# Patient Record
Sex: FEMALE | Race: WHITE | NOT HISPANIC OR LATINO | URBAN - METROPOLITAN AREA
[De-identification: names, ages, dates, MRNs, and addresses within clinical notes are randomized per-mention and may not be internally consistent; named-entity substitution may affect disease eponyms.]

---

## 2017-01-17 ENCOUNTER — APPOINTMENT (RX ONLY)
Dept: URBAN - METROPOLITAN AREA CLINIC 349 | Facility: CLINIC | Age: 72
Setting detail: DERMATOLOGY
End: 2017-01-17

## 2017-01-17 DIAGNOSIS — L57.0 ACTINIC KERATOSIS: ICD-10-CM

## 2017-01-17 DIAGNOSIS — L82.1 OTHER SEBORRHEIC KERATOSIS: ICD-10-CM

## 2017-01-17 DIAGNOSIS — Z41.9 ENCOUNTER FOR PROCEDURE FOR PURPOSES OTHER THAN REMEDYING HEALTH STATE, UNSPECIFIED: ICD-10-CM

## 2017-01-17 DIAGNOSIS — Z71.89 OTHER SPECIFIED COUNSELING: ICD-10-CM

## 2017-01-17 DIAGNOSIS — D18.0 HEMANGIOMA: ICD-10-CM

## 2017-01-17 PROBLEM — D18.01 HEMANGIOMA OF SKIN AND SUBCUTANEOUS TISSUE: Status: ACTIVE | Noted: 2017-01-17

## 2017-01-17 PROCEDURE — 17003 DESTRUCT PREMALG LES 2-14: CPT

## 2017-01-17 PROCEDURE — ? COUNSELING

## 2017-01-17 PROCEDURE — ? COSMETIC CONSULTATION: FILLERS

## 2017-01-17 PROCEDURE — ? LIQUID NITROGEN

## 2017-01-17 PROCEDURE — 17000 DESTRUCT PREMALG LESION: CPT

## 2017-01-17 PROCEDURE — ? RECOMMENDATIONS

## 2017-01-17 PROCEDURE — 99202 OFFICE O/P NEW SF 15 MIN: CPT | Mod: 25

## 2017-01-17 ASSESSMENT — LOCATION SIMPLE DESCRIPTION DERM
LOCATION SIMPLE: LEFT CHEEK
LOCATION SIMPLE: RIGHT CHEEK
LOCATION SIMPLE: CHEST

## 2017-01-17 ASSESSMENT — PAIN INTENSITY VAS: HOW INTENSE IS YOUR PAIN 0 BEING NO PAIN, 10 BEING THE MOST SEVERE PAIN POSSIBLE?: NO PAIN

## 2017-01-17 ASSESSMENT — LOCATION DETAILED DESCRIPTION DERM
LOCATION DETAILED: RIGHT INFERIOR CENTRAL MALAR CHEEK
LOCATION DETAILED: LEFT MEDIAL MALAR CHEEK
LOCATION DETAILED: LEFT INFERIOR LATERAL BUCCAL CHEEK
LOCATION DETAILED: LEFT LATERAL BUCCAL CHEEK
LOCATION DETAILED: LEFT SUPERIOR MEDIAL BUCCAL CHEEK
LOCATION DETAILED: LEFT MEDIAL SUPERIOR CHEST
LOCATION DETAILED: LEFT CENTRAL BUCCAL CHEEK

## 2017-01-17 ASSESSMENT — LOCATION ZONE DERM
LOCATION ZONE: FACE
LOCATION ZONE: TRUNK

## 2017-01-17 NOTE — PROCEDURE: LIQUID NITROGEN
Number Of Freeze-Thaw Cycles: 2 freeze-thaw cycles
Duration Of Freeze Thaw-Cycle (Seconds): 3
Consent: The patient's consent was obtained including but not limited to risks of crusting, scabbing, blistering, scarring, darker or lighter pigmentary change, recurrence, incomplete removal and infection.
Render Post-Care Instructions In Note?: no
Post-Care Instructions: I reviewed with the patient in detail post-care instructions. Patient is to wear sunprotection, and avoid picking at any of the treated lesions. Pt may apply Vaseline to crusted or scabbing areas.
Detail Level: Detailed

## 2017-01-26 ENCOUNTER — HOSPITAL ENCOUNTER (OUTPATIENT)
Dept: SURGERY | Age: 72
Discharge: HOME | End: 2017-01-26

## 2017-01-26 VITALS
DIASTOLIC BLOOD PRESSURE: 70 MMHG | WEIGHT: 137.25 LBS | RESPIRATION RATE: 16 BRPM | TEMPERATURE: 97.4 F | SYSTOLIC BLOOD PRESSURE: 121 MMHG | BODY MASS INDEX: 23.43 KG/M2 | HEART RATE: 73 BPM | OXYGEN SATURATION: 98 % | HEIGHT: 64 IN

## 2017-01-26 RX ORDER — PAROXETINE 10 MG/1
10 TABLET, FILM COATED ORAL
COMMUNITY

## 2017-01-26 RX ORDER — ZINC GLUCONATE 10 MG
LOZENGE ORAL
COMMUNITY
End: 2021-05-26 | Stop reason: CLARIF

## 2017-01-26 RX ORDER — OMEPRAZOLE 40 MG/1
40 CAPSULE, DELAYED RELEASE ORAL
COMMUNITY
End: 2021-05-26 | Stop reason: CLARIF

## 2017-01-26 NOTE — PERIOP NOTES
Patient verified name, , and surgery as listed in [facility]. TYPE  CASE:1b  Orders per surgeon:   Received  Labs per surgeon:none  Labs per anesthesia protocol: none  EKG  :  none      Patient provided with handouts including guide to surgery , transfusions, pain management and hand hygiene for the family and community. Pt verbalizes understanding of all pre-op instructions . Instructed that family must be present in building at all times. Hibiclens and instructions given per hospital policy. Instructed patient to continue  previous medications as prescribed prior to surgery and  to take the following medications the day of surgery according to anesthesia guidelines : omeprazole, paroxetine       Original medication prescription bottles not visualized during patient appointment. Continue all previous medications unless otherwise directed. Instructed patient to hold  the following medications prior to surgery: biotin, magnesium      Patient verbalized understanding of all instructions and provided all medical/health information to the best of their ability.

## 2017-02-02 ENCOUNTER — ANESTHESIA EVENT (OUTPATIENT)
Dept: SURGERY | Age: 72
End: 2017-02-02
Payer: MEDICARE

## 2017-02-03 ENCOUNTER — ANESTHESIA (OUTPATIENT)
Dept: SURGERY | Age: 72
End: 2017-02-03
Payer: MEDICARE

## 2017-02-03 ENCOUNTER — HOSPITAL ENCOUNTER (OUTPATIENT)
Age: 72
Setting detail: OUTPATIENT SURGERY
Discharge: HOME | End: 2017-02-03
Attending: PODIATRIST | Admitting: PODIATRIST
Payer: MEDICARE

## 2017-02-03 ENCOUNTER — APPOINTMENT (OUTPATIENT)
Dept: GENERAL RADIOLOGY | Age: 72
End: 2017-02-03
Attending: PODIATRIST
Payer: MEDICARE

## 2017-02-03 VITALS
TEMPERATURE: 97.5 F | HEART RATE: 58 BPM | WEIGHT: 137 LBS | RESPIRATION RATE: 16 BRPM | BODY MASS INDEX: 23.39 KG/M2 | OXYGEN SATURATION: 99 % | HEIGHT: 64 IN | DIASTOLIC BLOOD PRESSURE: 79 MMHG | SYSTOLIC BLOOD PRESSURE: 129 MMHG

## 2017-02-03 PROCEDURE — 77030013392 HC CAP PROTCT PIN JRGN -B: Performed by: PODIATRIST

## 2017-02-03 PROCEDURE — 74011250636 HC RX REV CODE- 250/636

## 2017-02-03 PROCEDURE — 77030020754 HC CUF TRNQT 2BLA STRY -B: Performed by: PODIATRIST

## 2017-02-03 PROCEDURE — 76060000035 HC ANESTHESIA 2 TO 2.5 HR: Performed by: PODIATRIST

## 2017-02-03 PROCEDURE — 76210000006 HC OR PH I REC 0.5 TO 1 HR: Performed by: PODIATRIST

## 2017-02-03 PROCEDURE — 77030006788 HC BLD SAW OSC STRY -B: Performed by: PODIATRIST

## 2017-02-03 PROCEDURE — 77030002916 HC SUT ETHLN J&J -A: Performed by: PODIATRIST

## 2017-02-03 PROCEDURE — 74011250636 HC RX REV CODE- 250/636: Performed by: ANESTHESIOLOGY

## 2017-02-03 PROCEDURE — 77030002933 HC SUT MCRYL J&J -A: Performed by: PODIATRIST

## 2017-02-03 PROCEDURE — C1713 ANCHOR/SCREW BN/BN,TIS/BN: HCPCS | Performed by: PODIATRIST

## 2017-02-03 PROCEDURE — 76210000020 HC REC RM PH II FIRST 0.5 HR: Performed by: PODIATRIST

## 2017-02-03 PROCEDURE — 74011250636 HC RX REV CODE- 250/636: Performed by: PODIATRIST

## 2017-02-03 PROCEDURE — 76010000131 HC OR TIME 2 TO 2.5 HR: Performed by: PODIATRIST

## 2017-02-03 PROCEDURE — 77030011640 HC PAD GRND REM COVD -A: Performed by: PODIATRIST

## 2017-02-03 PROCEDURE — 74011000250 HC RX REV CODE- 250

## 2017-02-03 PROCEDURE — 77030020778 HC CAP PROTCT PIN JRGN -A: Performed by: PODIATRIST

## 2017-02-03 PROCEDURE — 74011000250 HC RX REV CODE- 250: Performed by: PODIATRIST

## 2017-02-03 PROCEDURE — 77030031139 HC SUT VCRL2 J&J -A: Performed by: PODIATRIST

## 2017-02-03 RX ORDER — PROMETHAZINE HYDROCHLORIDE 25 MG/1
25 TABLET ORAL
Qty: 30 TAB | Refills: 0 | Status: SHIPPED | OUTPATIENT
Start: 2017-02-03 | End: 2017-02-17

## 2017-02-03 RX ORDER — CEFAZOLIN SODIUM IN 0.9 % NACL 2 G/50 ML
2 INTRAVENOUS SOLUTION, PIGGYBACK (ML) INTRAVENOUS ONCE
Status: COMPLETED | OUTPATIENT
Start: 2017-02-03 | End: 2017-02-03

## 2017-02-03 RX ORDER — SODIUM CHLORIDE, SODIUM LACTATE, POTASSIUM CHLORIDE, CALCIUM CHLORIDE 600; 310; 30; 20 MG/100ML; MG/100ML; MG/100ML; MG/100ML
100 INJECTION, SOLUTION INTRAVENOUS CONTINUOUS
Status: DISCONTINUED | OUTPATIENT
Start: 2017-02-03 | End: 2017-02-03 | Stop reason: HOSPADM

## 2017-02-03 RX ORDER — PROPOFOL 10 MG/ML
INJECTION, EMULSION INTRAVENOUS
Status: DISCONTINUED | OUTPATIENT
Start: 2017-02-03 | End: 2017-02-03 | Stop reason: HOSPADM

## 2017-02-03 RX ORDER — MIDAZOLAM HYDROCHLORIDE 1 MG/ML
2 INJECTION, SOLUTION INTRAMUSCULAR; INTRAVENOUS ONCE
Status: COMPLETED | OUTPATIENT
Start: 2017-02-03 | End: 2017-02-03

## 2017-02-03 RX ORDER — HYDROCODONE BITARTRATE AND ACETAMINOPHEN 7.5; 325 MG/1; MG/1
1 TABLET ORAL
Qty: 30 TAB | Refills: 0 | Status: SHIPPED | OUTPATIENT
Start: 2017-02-03 | End: 2021-05-26 | Stop reason: CLARIF

## 2017-02-03 RX ORDER — ONDANSETRON 2 MG/ML
4 INJECTION INTRAMUSCULAR; INTRAVENOUS ONCE
Status: DISCONTINUED | OUTPATIENT
Start: 2017-02-03 | End: 2017-02-03 | Stop reason: HOSPADM

## 2017-02-03 RX ORDER — NALOXONE HYDROCHLORIDE 0.4 MG/ML
0.1 INJECTION, SOLUTION INTRAMUSCULAR; INTRAVENOUS; SUBCUTANEOUS AS NEEDED
Status: DISCONTINUED | OUTPATIENT
Start: 2017-02-03 | End: 2017-02-03 | Stop reason: HOSPADM

## 2017-02-03 RX ORDER — CEFDINIR 300 MG/1
300 CAPSULE ORAL 2 TIMES DAILY
Qty: 20 CAP | Refills: 0 | Status: SHIPPED | OUTPATIENT
Start: 2017-02-03 | End: 2017-02-13

## 2017-02-03 RX ORDER — HYDROMORPHONE HYDROCHLORIDE 2 MG/ML
0.5 INJECTION, SOLUTION INTRAMUSCULAR; INTRAVENOUS; SUBCUTANEOUS
Status: DISCONTINUED | OUTPATIENT
Start: 2017-02-03 | End: 2017-02-03 | Stop reason: HOSPADM

## 2017-02-03 RX ORDER — DIPHENHYDRAMINE HYDROCHLORIDE 50 MG/ML
12.5 INJECTION, SOLUTION INTRAMUSCULAR; INTRAVENOUS
Status: DISCONTINUED | OUTPATIENT
Start: 2017-02-03 | End: 2017-02-03 | Stop reason: HOSPADM

## 2017-02-03 RX ORDER — BUPIVACAINE HYDROCHLORIDE 5 MG/ML
INJECTION, SOLUTION EPIDURAL; INTRACAUDAL AS NEEDED
Status: DISCONTINUED | OUTPATIENT
Start: 2017-02-03 | End: 2017-02-03 | Stop reason: HOSPADM

## 2017-02-03 RX ORDER — OXYCODONE HYDROCHLORIDE 5 MG/1
10 TABLET ORAL
Status: DISCONTINUED | OUTPATIENT
Start: 2017-02-03 | End: 2017-02-03 | Stop reason: HOSPADM

## 2017-02-03 RX ORDER — MIDAZOLAM HYDROCHLORIDE 1 MG/ML
2 INJECTION, SOLUTION INTRAMUSCULAR; INTRAVENOUS
Status: DISCONTINUED | OUTPATIENT
Start: 2017-02-03 | End: 2017-02-03 | Stop reason: HOSPADM

## 2017-02-03 RX ORDER — LIDOCAINE HYDROCHLORIDE 10 MG/ML
0.1 INJECTION INFILTRATION; PERINEURAL AS NEEDED
Status: DISCONTINUED | OUTPATIENT
Start: 2017-02-03 | End: 2017-02-03 | Stop reason: HOSPADM

## 2017-02-03 RX ORDER — FENTANYL CITRATE 50 UG/ML
100 INJECTION, SOLUTION INTRAMUSCULAR; INTRAVENOUS ONCE
Status: DISCONTINUED | OUTPATIENT
Start: 2017-02-03 | End: 2017-02-03 | Stop reason: HOSPADM

## 2017-02-03 RX ORDER — LIDOCAINE HYDROCHLORIDE 10 MG/ML
INJECTION INFILTRATION; PERINEURAL AS NEEDED
Status: DISCONTINUED | OUTPATIENT
Start: 2017-02-03 | End: 2017-02-03 | Stop reason: HOSPADM

## 2017-02-03 RX ORDER — FENTANYL CITRATE 50 UG/ML
INJECTION, SOLUTION INTRAMUSCULAR; INTRAVENOUS AS NEEDED
Status: DISCONTINUED | OUTPATIENT
Start: 2017-02-03 | End: 2017-02-03 | Stop reason: HOSPADM

## 2017-02-03 RX ORDER — PROPOFOL 10 MG/ML
INJECTION, EMULSION INTRAVENOUS AS NEEDED
Status: DISCONTINUED | OUTPATIENT
Start: 2017-02-03 | End: 2017-02-03 | Stop reason: HOSPADM

## 2017-02-03 RX ORDER — OXYCODONE AND ACETAMINOPHEN 7.5; 325 MG/1; MG/1
1 TABLET ORAL
Qty: 20 TAB | Refills: 0 | Status: SHIPPED | OUTPATIENT
Start: 2017-02-03 | End: 2021-05-26 | Stop reason: CLARIF

## 2017-02-03 RX ORDER — ALBUTEROL SULFATE 0.83 MG/ML
2.5 SOLUTION RESPIRATORY (INHALATION) AS NEEDED
Status: DISCONTINUED | OUTPATIENT
Start: 2017-02-03 | End: 2017-02-03 | Stop reason: HOSPADM

## 2017-02-03 RX ADMIN — FENTANYL CITRATE 25 MCG: 50 INJECTION, SOLUTION INTRAMUSCULAR; INTRAVENOUS at 13:15

## 2017-02-03 RX ADMIN — PROPOFOL 80 MG: 10 INJECTION, EMULSION INTRAVENOUS at 11:52

## 2017-02-03 RX ADMIN — CEFAZOLIN 2 G: 1 INJECTION, POWDER, FOR SOLUTION INTRAMUSCULAR; INTRAVENOUS; PARENTERAL at 11:46

## 2017-02-03 RX ADMIN — PROPOFOL 120 MCG/KG/MIN: 10 INJECTION, EMULSION INTRAVENOUS at 11:52

## 2017-02-03 RX ADMIN — PROPOFOL 30 MG: 10 INJECTION, EMULSION INTRAVENOUS at 12:15

## 2017-02-03 RX ADMIN — FENTANYL CITRATE 25 MCG: 50 INJECTION, SOLUTION INTRAMUSCULAR; INTRAVENOUS at 12:45

## 2017-02-03 RX ADMIN — SODIUM CHLORIDE, SODIUM LACTATE, POTASSIUM CHLORIDE, AND CALCIUM CHLORIDE 100 ML/HR: 600; 310; 30; 20 INJECTION, SOLUTION INTRAVENOUS at 09:38

## 2017-02-03 RX ADMIN — MIDAZOLAM HYDROCHLORIDE 2 MG: 1 INJECTION, SOLUTION INTRAMUSCULAR; INTRAVENOUS at 11:41

## 2017-02-03 RX ADMIN — FENTANYL CITRATE 50 MCG: 50 INJECTION, SOLUTION INTRAMUSCULAR; INTRAVENOUS at 11:49

## 2017-02-03 RX ADMIN — SODIUM CHLORIDE, SODIUM LACTATE, POTASSIUM CHLORIDE, AND CALCIUM CHLORIDE: 600; 310; 30; 20 INJECTION, SOLUTION INTRAVENOUS at 11:46

## 2017-02-03 NOTE — DISCHARGE INSTRUCTIONS
Ice and elevate left foot. Surgical shoe to left foot. May bear weight in surgical shoe. [facility]    Post operative instructions and recommendations for your personal comfort following foot surgery    1. Upon arrival home, lie down and elevate your feet and legs approximately 16 on pillows. Also, support your knees with pillows. 2. Post-operative swelling is greatly reduced by the use of ice packs. Ice packs should be applied over the top or bottom of the bandage every twenty minutes on the hour until returning to the office. 3. Take medication prescribed by the doctor according to directions. Avoid taking medication on an empty stomach. 4. Remain quiet and off of your feet as much as possible for the first 48 hours. After this period use discretion and common sense regarding the amount of standing or walking you do. Generally, post-operative patients should stay off their feet for 24-48hours. After this period walking to tolerance is usually allowed (unless otherwise specified by your doctor). 5. Do not be alarmed if there is some slight bleeding on the bandages; good blood supply is essential for normal tissue healing. 6. Keep feet elevated as much as possible for the first three days. Generally above the chest is most desirable. 7. Keep bandages completely dry. 8. Do not remove the surgical bandages. 9. The successful result of your surgery depends on the careful following of these instructions. 10. Please refrain from all alcoholic beverages. 11. If there are any questions or problems, please feel free to phone the doctor at the office. Thad Lopez, [address] [place] [address] [phone]  414 [facility] [phone]                          Temperature of 101 degrees or above. Redness, excessive swelling or bruising, and/or green or yellow, smelly discharge from incision. Loss of sensation - cold, white or blue toes.     AFTER ANESTHESIA  For the first 24 hours and while taking narcotics for pain: DO NOT Drive, Drink Alcoholic beverages, or make important Decisions. Be aware of dizziness following anesthesia and while taking pain medication.     OTHER INSTRUCTIONS    APPOINTMENT DATE/TIME_________________________________    Lor Valle DOCTOR'S PHONE NUMBER__________________________

## 2017-02-03 NOTE — OP NOTES
[facility] 64   OPERATIVE REPORT       Name:  Garry Su   MR#:  [id number]   :  1945   Account #:  [de-identified]   Date of Adm:  2017       DATE OF SURGERY: 2017      PREOPERATIVE DIAGNOSIS: Painful hammertoe deformities, digits 2,   3, 4, 5, left foot. POSTOPERATIVE DIAGNOSIS: Painful hammertoe deformities digits 2,   3, 4, 5, left foot. PROCEDURES   1. Hammertoe correction with arthrodesis of the proximal   interphalangeal joint and arthroplasty of the distal   interphalangeal joints of digits 2, 3, 4, left foot with K-wire   fixation. 2. Derotational arthroplasty, fifth digit, left foot. SURGEON: Barbee Litten, DPM    ANESTHESIA: IV sedation and local anesthesia consisting of 15 mL   of a 1:1 mixture of 1% lidocaine plain and 0.5% Marcaine plain. HEMOSTASIS: A well-padded ankle tourniquet at 250 mmHg for a   total of 94 minutes. PROCEDURE: The patient was brought to the operating room,   positioned on the operating table in normal prone position. The   patient was then properly identified as Cheryl Mayorga. IV   sedation, local anesthesia were administered to the patient's   left foot. The foot was prepped and draped in the usual   customary sterile technique. The foot was elevated,   exsanguinated and the tourniquet was inflated to 250   mmHg. Anesthesia was tested and found to be adequate,   the procedure began. Attention was directed to the second digit on the left foot. A   linear incision was made from the proximal interphalangeal   joint, extending to the distal phalangeal joint and then a   derotational horizontal incision was made with a resultant wedge   of skin removed. It was oriented with its narrower on the   lateral side and wider on the medial side in order to derotate   the tip of the toe. The resultant wedge of skin was removed.  The   subcutaneous tissue and skin were reflected both medially and   laterally to allow exposure of the extensor tendon. It was   transected at the level of the proximal and distal   interphalangeal joints. The medial, lateral collateral ligaments   were released from both joints. The tendon was freed proximally. The head of the proximal phalanx and the head of the middle   phalanx were then removed using the sagittal saw. The base of   the middle phalanx removed using a bony rongeur. There was some   hypertrophic bone formation at the dorsal lip at the base of the   distal phalanx and this was removed as well. The area was   thoroughly irrigated. A 0.045 K-wire was then inserted through   the base of the distal phalanx at the end of the toe and then   retrograded back across the middle and proximal phalanxes. Care   was taken not to cross the MPJ. Once adequate resection had been   performed, K-wire was bent, cut, and capped. The area was   irrigated again. A 3-0 Vicryl was used to reapproximate the   tendon and the skin was closed with 5-0 nylon in a horizontal   mattress fashion. The exact same procedure was then repeated on digits 3 and 4. There was a small exostosis noted at the medial base of the   distal phalanx on the third toe and this was rasped smooth. Attention was then directed to the fifth digit where 2   curvilinear incisions were made located from proximal lateral to   distal medial. Resultant wedge of skin was removed in order to   derotate the toe. Again, subcutaneous tissue and skin reflected   both medially and laterally to allow exposure of the extensor   tendon. This was transected to the level of the PIPJ. The medial   and lateral collateral ligaments were released. The tendon was   freed proximally. The head of the proximal phalanx was removed. There is some hypertrophic bone formation noted at the lateral   base and this was removed. Also, a hemiphalangectomy was   performed of the middle and distal phalanxes. This was   thoroughly irrigated.  Closure then began using 3-0 Vicryl to   reapproximate the tendon and the skin was closed with 5-0 nylon   in a horizontal mattress fashion. A Betadine-soaked Adaptic with a dry sterile overwrap was   applied. The tourniquet was released for a total of 94 minutes. Immediate capillary refill was noted to all digits. The patient   tolerated the procedure and anesthesia well, without apparent   complications, was transferred to the recovery room with vital   signs stable and vascular status intact. She was given both oral   and postoperative instructions as well as a followup appointment   in our office in 5 to 7 days. Raghu Jeronimo DPM, CWP / Dale Wynn   D:  02/03/2017   15:42   T:  02/03/2017   17:08   Job #:  [id number]

## 2017-02-03 NOTE — IP AVS SNAPSHOT
[facility] 
 
 
 [address] 
[phone] Patient: Bernie Butts MRN: [id number] QSK:[id number] You are allergic to the following No active allergies Recent Documentation Height Weight BMI OB Status Smoking Status 1.626 m 62.1 kg 23.52 kg/m2 Hysterectomy Former Smoker Emergency Contacts Name Discharge Info Relation Home Work Mobile Chaz Arboleda DISCHARGE CAREGIVER [3] Spouse [3] [phone] James Arboleda  Child [2] [phone] About your hospitalization You were admitted on:  February 3, 2017 You last received care in the:  [facility] OP PACU You were discharged on:  February 3, 2017 Unit phone number:  [phone] Why you were hospitalized Your primary diagnosis was:  Not on File Providers Seen During Your Hospitalizations Provider Role Specialty Primary office phone Cory Fox DPM Attending Provider Podiatry [phone] Your Primary Care Physician (PCP) Primary Care Physician Office Phone Office Fax Jackie Espinoza[address] [phone] Follow-up Information Follow up With Details Comments Contact Info Margarita Anderson MD   [place] [address] 
[phone] Current Discharge Medication List  
  
START taking these medications Dose & Instructions Dispensing Information Comments Morning Noon Evening Bedtime  
 cefdinir 300 mg capsule Commonly known as:  OMNICEF Your next dose is: Today, Tomorrow Other:  _________ Dose:  300 mg Take 1 Cap by mouth two (2) times a day for 10 days. Quantity:  20 Cap Refills:  0 HYDROcodone-acetaminophen 7.5-325 mg per tablet Commonly known as:  [facility] Your next dose is: Today, Tomorrow Other:  _________ Dose:  1 Tab Take 1 Tab by mouth every six (6) hours as needed for Pain (moderate pain). Max Daily Amount: 4 Tabs. Quantity:  30 Tab Refills:  0  
     
   
   
   
  
 oxyCODONE-acetaminophen 7.5-325 mg per tablet Commonly known as:  PERCOCET 7.5 Your next dose is: Today, Tomorrow Other:  _________ Dose:  1 Tab Take 1 Tab by mouth every six (6) hours as needed for Pain (severe pain). Max Daily Amount: 4 Tabs. Quantity:  20 Tab Refills:  0  
     
   
   
   
  
 promethazine 25 mg tablet Commonly known as:  PHENERGAN Your next dose is: Today, Tomorrow Other:  _________ Dose:  25 mg Take 1 Tab by mouth every six (6) hours as needed for Nausea for up to 14 days. Quantity:  30 Tab Refills:  0 CONTINUE these medications which have NOT CHANGED Dose & Instructions Dispensing Information Comments Morning Noon Evening Bedtime ALIGN 4 mg Cap Generic drug:  Bifidobacterium Infantis Your next dose is: Today, Tomorrow Other:  _________ Take  by mouth every morning. Refills:  0  
     
   
   
   
  
 BIOTIN PO Your next dose is: Today, Tomorrow Other:  _________ Dose:  7500 mcg Take 7,500 mcg by mouth every morning. Indications: hold until after surgery Refills:  0  
     
   
   
   
  
 magnesium 250 mg Tab Your next dose is: Today, Tomorrow Other:  _________ Take  by mouth every morning. Indications: hold until after surgery Refills:  0  
     
   
   
   
  
 omeprazole 40 mg capsule Commonly known as:  PRILOSEC Your next dose is: Today, Tomorrow Other:  _________ Dose:  40 mg Take 40 mg by mouth every morning. Refills:  0 PARoxetine 10 mg tablet Commonly known as:  PAXIL Your next dose is: Today, Tomorrow Other:  _________  Dose:  10 mg  
 Take 10 mg by mouth every morning. Indications: ANXIETY WITH DEPRESSION Refills:  0 Where to Get Your Medications Information on where to get these meds will be given to you by the nurse or doctor. ! Ask your nurse or doctor about these medications  
  cefdinir 300 mg capsule HYDROcodone-acetaminophen 7.5-325 mg per tablet  
 oxyCODONE-acetaminophen 7.5-325 mg per tablet  
 promethazine 25 mg tablet Discharge Instructions Ice and elevate left foot. Surgical shoe to left foot. May bear weight in surgical shoe. [facility] Post operative instructions and recommendations for your personal comfort following foot surgery 1. Upon arrival home, lie down and elevate your feet and legs approximately 16 on pillows. Also, support your knees with pillows. 2. Post-operative swelling is greatly reduced by the use of ice packs. Ice packs should be applied over the top or bottom of the bandage every twenty minutes on the hour until returning to the office. 3. Take medication prescribed by the doctor according to directions. Avoid taking medication on an empty stomach. 4. Remain quiet and off of your feet as much as possible for the first 48 hours. After this period use discretion and common sense regarding the amount of standing or walking you do. Generally, post-operative patients should stay off their feet for 24-48hours. After this period walking to tolerance is usually allowed (unless otherwise specified by your doctor). 5. Do not be alarmed if there is some slight bleeding on the bandages; good blood supply is essential for normal tissue healing. 6. Keep feet elevated as much as possible for the first three days. Generally above the chest is most desirable. 7. Keep bandages completely dry. 8. Do not remove the surgical bandages.  
9. The successful result of your surgery depends on the careful following of these instructions. 10. Please refrain from all alcoholic beverages. 11. If there are any questions or problems, please feel free to phone the doctor at the office. Thad Lopez, [address] [phone] 
[address] [place] [phone] Temperature of 101 degrees or above. Redness, excessive swelling or bruising, and/or green or yellow, smelly discharge from incision. Loss of sensation - cold, white or blue toes. AFTER ANESTHESIA For the first 24 hours and while taking narcotics for pain: DO NOT Drive, Drink Alcoholic beverages, or make important Decisions. Be aware of dizziness following anesthesia and while taking pain medication. OTHER INSTRUCTIONS APPOINTMENT DATE/TIME_________________________________ YOUR DOCTOR'S PHONE NUMBER__________________________ Discharge Orders None Introducing [facility] SERVICES! Dear Contreras Saavedra: Thank you for requesting a [employer] account. Our records indicate that you already have an active [employer] account. You can access your account anytime at [email or web address] Did you know that you can access your hospital and ER discharge instructions at any time in [employer]? You can also review all of your test results from your hospital stay or ER visit. Additional Information If you have questions, please visit the Frequently Asked Questions section of the [employer] website at [email or web address] Remember, [employer] is NOT to be used for urgent needs. For medical emergencies, dial 911. Now available from your iPhone and Android! General Information Please provide this summary of care documentation to your next provider. Patient Signature:  ____________________________________________________________ Date:  ____________________________________________________________  
  
[facility]  Provider Signature: ____________________________________________________________ Date:  ____________________________________________________________

## 2017-02-03 NOTE — BRIEF OP NOTE
BRIEF OPERATIVE NOTE    Date of Procedure: 2/3/2017   Preoperative Diagnosis: Acquired hammer toe of left foot [M20.42]  Postoperative Diagnosis: Acquired hammer toe of left foot     Procedure(s):  LEFT TOE HAMMERTOE CORRECTION DIGITS 2/3/4/5 WITH K-WIRE FIXATION  Surgeon(s) and Role:     * Jeremy Garner DPM - Primary            Surgical Staff:  Circ-1: Ish Hayes RN  Scrub Tech-1: Josue Park  Event Time In   Incision Start 1151   Incision Close 1342     Anesthesia: MAC   Estimated Blood Loss: less than 5cc  Specimens: * No specimens in log *   Findings: dictated   Complications: none  Implants:   Implant Name Type Inv. Item Serial No.  Lot No. LRB No. Used Action   . [address] [facility] [id number] Left 2 Implanted   . 035 k-wire       [employer] [id number] Left 1 Implanted

## 2017-02-03 NOTE — H&P
Patient: Benjamin Zazueta MRN: [id number]  SSN: [id number]    YOB: 1945  Age: 67 y.o. Sex: female      History and Physical    Benjamin Zazueta is a 67 y.o. female having Procedure(s):  LEFT TOE HAMMERTOE CORRECTION DIGITS 2/3/4/5 WITH K-WIRE FIXATION. Allergies: No Known Allergies     Chief Complaint: painful hammer toe     History of Present Illness: hammer toe defoemity    Past Medical History   Diagnosis Date    Anxiety     Arthritis      osteo    Chronic pain      back and feet pain    Former smoker quit 1985     15 yrs 1 1/2 pk per day    GERD (gastroesophageal reflux disease)      controlled by prilosec    Hormone replacement therapy (postmenopausal)       Past Surgical History   Procedure Laterality Date    Hx hysterectomy  1992    Hx bladder repair  2006     bladder tac    Hx hammer toe repair Right     Hx back surgery  approx 2003     rhizotomy    Hx lumbar laminectomy  1998          Hx lumbar fusion        Family History   Problem Relation Age of Onset    Cancer Sister      pancreatatic      Social History   Substance Use Topics    Smoking status: Former Smoker     Packs/day: 1.50     Years: 15.00    Smokeless tobacco: Never Used      Comment: quit 1985    Alcohol use 1.0 oz/week     2 Glasses of wine per week        Prior to Admission medications    Medication Sig Start Date End Date Taking? Authorizing Provider   BIOTIN PO Take 7,500 mcg by mouth every morning. Indications: hold until after surgery   Yes Historical Provider   Bifidobacterium Infantis (ALIGN) 4 mg cap Take  by mouth every morning. Yes Historical Provider   omeprazole (PRILOSEC) 40 mg capsule Take 40 mg by mouth every morning. Yes Historical Provider   PARoxetine (PAXIL) 10 mg tablet Take 10 mg by mouth every morning. Indications: ANXIETY WITH DEPRESSION   Yes Historical Provider   magnesium 250 mg tab Take  by mouth every morning.  Indications: hold until after surgery   Yes Historical Provider Visit Vitals    /69 (BP 1 Location: Left arm, BP Patient Position: At rest)    Pulse 67    Temp 36.4 °C (97.5 °F)    Resp 18    Ht 5' 4\" (1.626 m)    Wt 62.1 kg (137 lb)    SpO2 98%    BMI 23.52 kg/m2        Assessment and Plan:   Lana Woo is a 67 y.o. female having Procedure(s):  LEFT TOE HAMMERTOE CORRECTION DIGITS 2/3/4/5 WITH K-WIRE FIXATION for hammer toe deformity.     Preanesthesia Evaluation     Last edited 02/03/17 0932 by Marcelo Sibley MD             Anesthetic History               Review of Systems / Medical History  Patient summary reviewed, nursing notes reviewed and pertinent labs reviewed    Pulmonary                   Neuro/Psych              Cardiovascular                  Exercise tolerance: >4 METS     GI/Hepatic/Renal                Endo/Other             Other Findings              Physical Exam    Airway  Mallampati: II  TM Distance: 4 - 6 cm  Neck ROM: normal range of motion   Mouth opening: Normal     Cardiovascular  Regular rate and rhythm,  S1 and S2 normal,  no murmur, click, rub, or gallop             Dental  No notable dental hx       Pulmonary  Breath sounds clear to auscultation               Abdominal         Other Findings            Anesthetic Plan    ASA: 2  Anesthesia type: total IV anesthesia          Induction: Intravenous  Anesthetic plan and risks discussed with: Patient               Preanesthesia evaluation performed by Marcelo Sibley MD            Signed By: Marcelo Sibley MD     February 3, 2017

## 2017-02-03 NOTE — ANESTHESIA PREPROCEDURE EVALUATION
Anesthetic History               Review of Systems / Medical History  Patient summary reviewed, nursing notes reviewed and pertinent labs reviewed    Pulmonary                   Neuro/Psych              Cardiovascular                  Exercise tolerance: >4 METS     GI/Hepatic/Renal                Endo/Other             Other Findings              Physical Exam    Airway  Mallampati: II  TM Distance: 4 - 6 cm  Neck ROM: normal range of motion   Mouth opening: Normal     Cardiovascular  Regular rate and rhythm,  S1 and S2 normal,  no murmur, click, rub, or gallop             Dental  No notable dental hx       Pulmonary  Breath sounds clear to auscultation               Abdominal         Other Findings            Anesthetic Plan    ASA: 2  Anesthesia type: total IV anesthesia          Induction: Intravenous  Anesthetic plan and risks discussed with: Patient

## 2017-02-03 NOTE — IP AVS SNAPSHOT
Current Discharge Medication List  
  
Take these medications at their scheduled times Dose & Instructions Dispensing Information Comments Morning Noon Evening Bedtime ALIGN 4 mg Cap Generic drug:  Bifidobacterium Infantis Your next dose is: Today, Tomorrow Other:  ____________ Take  by mouth every morning. Refills:  0  
     
   
   
   
  
 BIOTIN PO Your next dose is: Today, Tomorrow Other:  ____________ Dose:  7500 mcg Take 7,500 mcg by mouth every morning. Indications: hold until after surgery Refills:  0  
     
   
   
   
  
 cefdinir 300 mg capsule Commonly known as:  OMNICEF Your next dose is: Today, Tomorrow Other:  ____________ Dose:  300 mg Take 1 Cap by mouth two (2) times a day for 10 days. Quantity:  20 Cap Refills:  0  
     
   
   
   
  
 magnesium 250 mg Tab Your next dose is: Today, Tomorrow Other:  ____________ Take  by mouth every morning. Indications: hold until after surgery Refills:  0  
     
   
   
   
  
 omeprazole 40 mg capsule Commonly known as:  PRILOSEC Your next dose is: Today, Tomorrow Other:  ____________ Dose:  40 mg Take 40 mg by mouth every morning. Refills:  0 PARoxetine 10 mg tablet Commonly known as:  PAXIL Your next dose is: Today, Tomorrow Other:  ____________ Dose:  10 mg Take 10 mg by mouth every morning. Indications: ANXIETY WITH DEPRESSION Refills:  0 Take these medications as needed Dose & Instructions Dispensing Information Comments Morning Noon Evening Bedtime HYDROcodone-acetaminophen 7.5-325 mg per tablet Commonly known as:  March Castles Your next dose is: Today, Tomorrow Other:  ____________ Dose:  1 Tab Take 1 Tab by mouth every six (6) hours as needed for Pain (moderate pain). Max Daily Amount: 4 Tabs. Quantity:  30 Tab Refills:  0  
     
   
   
   
  
 oxyCODONE-acetaminophen 7.5-325 mg per tablet Commonly known as:  PERCOCET 7.5 Your next dose is: Today, Tomorrow Other:  ____________ Dose:  1 Tab Take 1 Tab by mouth every six (6) hours as needed for Pain (severe pain). Max Daily Amount: 4 Tabs. Quantity:  20 Tab Refills:  0  
     
   
   
   
  
 promethazine 25 mg tablet Commonly known as:  PHENERGAN Your next dose is: Today, Tomorrow Other:  ____________ Dose:  25 mg Take 1 Tab by mouth every six (6) hours as needed for Nausea for up to 14 days. Quantity:  30 Tab Refills:  0 Where to Get Your Medications Information about where to get these medications is not yet available ! Ask your nurse or doctor about these medications  
  cefdinir 300 mg capsule HYDROcodone-acetaminophen 7.5-325 mg per tablet  
 oxyCODONE-acetaminophen 7.5-325 mg per tablet  
 promethazine 25 mg tablet

## 2017-02-05 NOTE — ANESTHESIA POSTPROCEDURE EVALUATION
Post-Anesthesia Evaluation and Assessment    Patient: Neil Be MRN: [id number]  SSN: [id number]    YOB: 1945  Age: 67 y.o. Sex: female       Cardiovascular Function/Vital Signs  Visit Vitals    /79 (BP 1 Location: Left arm, BP Patient Position: At rest)    Pulse (!) 58    Temp 36.4 °C (97.5 °F)    Resp 16    Ht 5' 4\" (1.626 m)    Wt 62.1 kg (137 lb)    SpO2 99%    BMI 23.52 kg/m2       Patient is status post total IV anesthesia anesthesia for Procedure(s):  LEFT TOE HAMMERTOE CORRECTION DIGITS 2/3/4/5 WITH K-WIRE FIXATION. Nausea/Vomiting: None    Postoperative hydration reviewed and adequate. Pain:  Pain Scale 1: Numeric (0 - 10) (02/03/17 1429)  Pain Intensity 1: 0 (02/03/17 1429)   Managed    Neurological Status:   Neuro (WDL): Within Defined Limits (02/03/17 1429)   At baseline    Mental Status and Level of Consciousness: Arousable    Pulmonary Status:   O2 Device: Room air (02/03/17 1429)   Adequate oxygenation and airway patent    Complications related to anesthesia: None    Post-anesthesia assessment completed.  No concerns    Signed By: Alvina Daley MD     February 5, 2017

## 2017-12-19 ENCOUNTER — HOSPITAL ENCOUNTER (OUTPATIENT)
Dept: LAB | Age: 72
Discharge: HOME | End: 2017-12-19

## 2017-12-19 PROCEDURE — 88305 TISSUE EXAM BY PATHOLOGIST: CPT | Performed by: INTERNAL MEDICINE

## 2017-12-19 PROCEDURE — 88312 SPECIAL STAINS GROUP 1: CPT | Performed by: INTERNAL MEDICINE

## 2018-01-24 ENCOUNTER — HOSPITAL ENCOUNTER (OUTPATIENT)
Dept: MAMMOGRAPHY | Age: 73
Discharge: HOME | End: 2018-01-24
Attending: FAMILY MEDICINE
Payer: MEDICARE

## 2018-01-24 DIAGNOSIS — M89.9 DISORDER OF BONE: ICD-10-CM

## 2018-01-24 PROCEDURE — 77080 DXA BONE DENSITY AXIAL: CPT

## 2018-01-25 ENCOUNTER — APPOINTMENT (RX ONLY)
Dept: URBAN - METROPOLITAN AREA CLINIC 349 | Facility: CLINIC | Age: 73
Setting detail: DERMATOLOGY
End: 2018-01-25

## 2018-01-25 DIAGNOSIS — L82.1 OTHER SEBORRHEIC KERATOSIS: ICD-10-CM

## 2018-01-25 DIAGNOSIS — D18.0 HEMANGIOMA: ICD-10-CM

## 2018-01-25 DIAGNOSIS — L92.8 OTHER GRANULOMATOUS DISORDERS OF THE SKIN AND SUBCUTANEOUS TISSUE: ICD-10-CM

## 2018-01-25 DIAGNOSIS — L82.0 INFLAMED SEBORRHEIC KERATOSIS: ICD-10-CM

## 2018-01-25 PROBLEM — D18.01 HEMANGIOMA OF SKIN AND SUBCUTANEOUS TISSUE: Status: ACTIVE | Noted: 2018-01-25

## 2018-01-25 PROBLEM — L98.8 OTHER SPECIFIED DISORDERS OF THE SKIN AND SUBCUTANEOUS TISSUE: Status: ACTIVE | Noted: 2018-01-25

## 2018-01-25 PROCEDURE — A4550 SURGICAL TRAYS: HCPCS

## 2018-01-25 PROCEDURE — ? PATHOLOGY BILLING

## 2018-01-25 PROCEDURE — ? PRESCRIPTION

## 2018-01-25 PROCEDURE — ? LIQUID NITROGEN

## 2018-01-25 PROCEDURE — ? COUNSELING

## 2018-01-25 PROCEDURE — 99213 OFFICE O/P EST LOW 20 MIN: CPT | Mod: 25

## 2018-01-25 PROCEDURE — 88305 TISSUE EXAM BY PATHOLOGIST: CPT

## 2018-01-25 PROCEDURE — ? SHAVE REMOVAL

## 2018-01-25 PROCEDURE — 11301 SHAVE SKIN LESION 0.6-1.0 CM: CPT | Mod: 59

## 2018-01-25 PROCEDURE — 17110 DESTRUCTION B9 LES UP TO 14: CPT

## 2018-01-25 PROCEDURE — ? OTHER

## 2018-01-25 RX ORDER — CLOBETASOL PROPIONATE 0.5 MG/G
GEL TOPICAL
Qty: 1 | Refills: 1 | Status: ERX | COMMUNITY
Start: 2018-01-25

## 2018-01-25 RX ADMIN — CLOBETASOL PROPIONATE: 0.5 GEL TOPICAL at 00:00

## 2018-01-25 ASSESSMENT — LOCATION ZONE DERM
LOCATION ZONE: TRUNK
LOCATION ZONE: SCALP
LOCATION ZONE: TRUNK
LOCATION ZONE: FACE
LOCATION ZONE: FACE

## 2018-01-25 ASSESSMENT — LOCATION SIMPLE DESCRIPTION DERM
LOCATION SIMPLE: CHEST
LOCATION SIMPLE: RIGHT CHEEK
LOCATION SIMPLE: LEFT CHEEK
LOCATION SIMPLE: RIGHT CHEEK
LOCATION SIMPLE: LEFT UPPER BACK
LOCATION SIMPLE: LEFT UPPER BACK
LOCATION SIMPLE: LEFT CHEEK
LOCATION SIMPLE: POSTERIOR SCALP

## 2018-01-25 ASSESSMENT — LOCATION DETAILED DESCRIPTION DERM
LOCATION DETAILED: RIGHT INFERIOR CENTRAL MALAR CHEEK
LOCATION DETAILED: LEFT INFERIOR CENTRAL MALAR CHEEK
LOCATION DETAILED: RIGHT SUPERIOR PREAURICULAR CHEEK
LOCATION DETAILED: LEFT CENTRAL MALAR CHEEK
LOCATION DETAILED: LEFT CENTRAL MALAR CHEEK
LOCATION DETAILED: RIGHT INFERIOR LATERAL MALAR CHEEK
LOCATION DETAILED: LEFT MEDIAL SUPERIOR CHEST
LOCATION DETAILED: RIGHT OCCIPITAL SCALP
LOCATION DETAILED: RIGHT SUPERIOR LATERAL MALAR CHEEK
LOCATION DETAILED: LEFT INFERIOR CENTRAL MALAR CHEEK
LOCATION DETAILED: LEFT INFERIOR UPPER BACK
LOCATION DETAILED: LEFT INFERIOR UPPER BACK

## 2018-01-25 NOTE — PROCEDURE: SHAVE REMOVAL
Biopsy Method: Dermablade
Consent was obtained from the patient. The risks and benefits to therapy were discussed in detail. Specifically, the risks of infection, scarring, bleeding, prolonged wound healing, incomplete removal, allergy to anesthesia, nerve injury and recurrence were addressed. Prior to the procedure, the treatment site was clearly identified and confirmed by the patient. All components of Universal Protocol/PAUSE Rule completed.
Anesthesia Type: 2% lidocaine with epinephrine
Size Of Lesion In Cm (Required): 0.8
Post-Care Instructions: I reviewed with the patient in detail post-care instructions. Patient is to keep the biopsy site dry overnight, and then apply vaseline twice daily until healed. Patient may apply hydrogen peroxide soaks to remove any crusting. After the procedure, the patient was observed for 5-10 minutes and was oriented to person, place and time and demied feeling dizzy, queasy, and stated that they did not feel that they were going to faint.
Anesthesia Volume In Cc: 0.5
Render Post-Care Instructions In Note?: yes
Notification Instructions: Patient will be notified of biopsy results. However, patient instructed to call the office if not contacted within 2 weeks.
Hemostasis: Electrocautery
X Size Of Lesion In Cm (Optional): 0
Size Of Margin In Cm (Margins Are Not Added To Billing Dimensions): -
Path Notes (To The Dermatopathologist): Please check margins.
Medical Necessity Information: It is in your best interest to select a reason for this procedure from the list below. All of these items fulfill various CMS LCD requirements except the new and changing color options.
Detail Level: Detailed
Accession #: [id number] [email or web address] read
Bill [id number] For Specimen Handling/Conveyance To Laboratory?: no
Billing Type: Third-Party Bill
Medical Necessity Clause: This procedure was medically necessary because the lesion that was treated was: changing
Wound Care: Vaseline

## 2019-01-24 ENCOUNTER — APPOINTMENT (RX ONLY)
Dept: URBAN - METROPOLITAN AREA CLINIC 349 | Facility: CLINIC | Age: 74
Setting detail: DERMATOLOGY
End: 2019-01-24

## 2019-01-24 DIAGNOSIS — L92.8 OTHER GRANULOMATOUS DISORDERS OF THE SKIN AND SUBCUTANEOUS TISSUE: ICD-10-CM

## 2019-01-24 DIAGNOSIS — L57.0 ACTINIC KERATOSIS: ICD-10-CM

## 2019-01-24 DIAGNOSIS — L82.1 OTHER SEBORRHEIC KERATOSIS: ICD-10-CM

## 2019-01-24 DIAGNOSIS — L82.0 INFLAMED SEBORRHEIC KERATOSIS: ICD-10-CM

## 2019-01-24 PROBLEM — L98.8 OTHER SPECIFIED DISORDERS OF THE SKIN AND SUBCUTANEOUS TISSUE: Status: ACTIVE | Noted: 2019-01-24

## 2019-01-24 PROCEDURE — ? LIQUID NITROGEN

## 2019-01-24 PROCEDURE — 17110 DESTRUCTION B9 LES UP TO 14: CPT

## 2019-01-24 PROCEDURE — 99213 OFFICE O/P EST LOW 20 MIN: CPT | Mod: 25

## 2019-01-24 PROCEDURE — 17000 DESTRUCT PREMALG LESION: CPT | Mod: 59

## 2019-01-24 PROCEDURE — ? PRESCRIPTION

## 2019-01-24 PROCEDURE — ? COUNSELING

## 2019-01-24 PROCEDURE — 17003 DESTRUCT PREMALG LES 2-14: CPT

## 2019-01-24 RX ORDER — CLOBETASOL PROPIONATE 0.5 MG/G
GEL TOPICAL
Qty: 1 | Refills: 1 | Status: ERX

## 2019-01-24 ASSESSMENT — LOCATION ZONE DERM
LOCATION ZONE: LIP
LOCATION ZONE: TRUNK
LOCATION ZONE: ARM
LOCATION ZONE: HAND
LOCATION ZONE: LEG
LOCATION ZONE: SCALP

## 2019-01-24 ASSESSMENT — LOCATION SIMPLE DESCRIPTION DERM
LOCATION SIMPLE: LEFT UPPER BACK
LOCATION SIMPLE: RIGHT UPPER BACK
LOCATION SIMPLE: POSTERIOR SCALP
LOCATION SIMPLE: LEFT LIP
LOCATION SIMPLE: LEFT HAND
LOCATION SIMPLE: LEFT THIGH
LOCATION SIMPLE: RIGHT BREAST
LOCATION SIMPLE: LEFT FOREARM

## 2019-01-24 ASSESSMENT — LOCATION DETAILED DESCRIPTION DERM
LOCATION DETAILED: RIGHT SUPERIOR MEDIAL UPPER BACK
LOCATION DETAILED: RIGHT OCCIPITAL SCALP
LOCATION DETAILED: LEFT ANTERIOR DISTAL THIGH
LOCATION DETAILED: LEFT RADIAL DORSAL HAND
LOCATION DETAILED: RIGHT MEDIAL BREAST 2-3:00 REGION
LOCATION DETAILED: LEFT DISTAL DORSAL FOREARM
LOCATION DETAILED: LEFT UPPER CUTANEOUS LIP
LOCATION DETAILED: LEFT SUPERIOR UPPER BACK

## 2019-01-24 NOTE — PROCEDURE: LIQUID NITROGEN
Render Post-Care Instructions In Note?: no
Medical Necessity Clause: This procedure was medically necessary because the lesions that were treated were:
Detail Level: Detailed
Medical Necessity Information: It is in your best interest to select a reason for this procedure from the list below. All of these items fulfill various CMS LCD requirements except the new and changing color options.
Duration Of Freeze Thaw-Cycle (Seconds): 3
Consent: The patient's consent was obtained including but not limited to risks of crusting, scabbing, blistering, scarring, darker or lighter pigmentary change, recurrence, incomplete removal and infection.
Post-Care Instructions: I reviewed with the patient in detail post-care instructions. Patient is to wear sunprotection, and avoid picking at any of the treated lesions. Pt may apply Vaseline to crusted or scabbing areas.
Number Of Freeze-Thaw Cycles: 1 freeze-thaw cycle
Duration Of Freeze Thaw-Cycle (Seconds): 2
Include Z78.9 (Other Specified Conditions Influencing Health Status) As An Associated Diagnosis?: Yes

## 2019-02-01 ENCOUNTER — RX ONLY (OUTPATIENT)
Age: 74
Setting detail: RX ONLY
End: 2019-02-01

## 2019-02-01 RX ORDER — FLUTICASONE PROPIONATE 0.05 MG/G
OINTMENT TOPICAL
Qty: 1 | Refills: 1 | Status: ERX | COMMUNITY
Start: 2019-02-01

## 2019-07-29 ENCOUNTER — HOSPITAL ENCOUNTER (OUTPATIENT)
Dept: MRI IMAGING | Age: 74
Discharge: HOME | End: 2019-07-29
Attending: FAMILY MEDICINE
Payer: MEDICARE

## 2019-07-29 DIAGNOSIS — M89.8X1 SHOULDER BLADE PAIN: ICD-10-CM

## 2019-07-29 DIAGNOSIS — M25.512 LEFT SHOULDER PAIN, UNSPECIFIED CHRONICITY: ICD-10-CM

## 2019-07-29 PROCEDURE — 73221 MRI JOINT UPR EXTREM W/O DYE: CPT

## 2020-02-13 ENCOUNTER — APPOINTMENT (RX ONLY)
Dept: URBAN - METROPOLITAN AREA CLINIC 349 | Facility: CLINIC | Age: 75
Setting detail: DERMATOLOGY
End: 2020-02-13

## 2020-02-13 DIAGNOSIS — L82.1 OTHER SEBORRHEIC KERATOSIS: ICD-10-CM

## 2020-02-13 DIAGNOSIS — L57.0 ACTINIC KERATOSIS: ICD-10-CM

## 2020-02-13 DIAGNOSIS — L82.0 INFLAMED SEBORRHEIC KERATOSIS: ICD-10-CM

## 2020-02-13 DIAGNOSIS — L92.8 OTHER GRANULOMATOUS DISORDERS OF THE SKIN AND SUBCUTANEOUS TISSUE: ICD-10-CM | Status: WELL CONTROLLED

## 2020-02-13 DIAGNOSIS — D18.0 HEMANGIOMA: ICD-10-CM

## 2020-02-13 PROBLEM — L98.8 OTHER SPECIFIED DISORDERS OF THE SKIN AND SUBCUTANEOUS TISSUE: Status: ACTIVE | Noted: 2020-02-13

## 2020-02-13 PROBLEM — M12.9 ARTHROPATHY, UNSPECIFIED: Status: ACTIVE | Noted: 2020-02-13

## 2020-02-13 PROBLEM — D18.01 HEMANGIOMA OF SKIN AND SUBCUTANEOUS TISSUE: Status: ACTIVE | Noted: 2020-02-13

## 2020-02-13 PROCEDURE — ? LIQUID NITROGEN

## 2020-02-13 PROCEDURE — 17000 DESTRUCT PREMALG LESION: CPT | Mod: 59

## 2020-02-13 PROCEDURE — 17110 DESTRUCTION B9 LES UP TO 14: CPT

## 2020-02-13 PROCEDURE — ? COUNSELING

## 2020-02-13 PROCEDURE — ? TREATMENT REGIMEN

## 2020-02-13 PROCEDURE — ? BENIGN DESTRUCTION

## 2020-02-13 PROCEDURE — 99213 OFFICE O/P EST LOW 20 MIN: CPT | Mod: 25

## 2020-02-13 ASSESSMENT — LOCATION DETAILED DESCRIPTION DERM
LOCATION DETAILED: RIGHT MEDIAL BREAST 2-3:00 REGION
LOCATION DETAILED: LEFT CENTRAL POSTERIOR NECK
LOCATION DETAILED: RIGHT INFERIOR CENTRAL MALAR CHEEK
LOCATION DETAILED: RIGHT OCCIPITAL SCALP
LOCATION DETAILED: LEFT INFERIOR CENTRAL MALAR CHEEK
LOCATION DETAILED: LEFT MEDIAL SUPERIOR CHEST
LOCATION DETAILED: LEFT CENTRAL POSTERIOR NECK
LOCATION DETAILED: LEFT CENTRAL MALAR CHEEK
LOCATION DETAILED: LEFT INFERIOR CENTRAL MALAR CHEEK
LOCATION DETAILED: LEFT INFERIOR LATERAL MALAR CHEEK
LOCATION DETAILED: RIGHT INFERIOR CENTRAL MALAR CHEEK
LOCATION DETAILED: RIGHT SUPERIOR MEDIAL UPPER BACK
LOCATION DETAILED: LEFT INFERIOR LATERAL MALAR CHEEK
LOCATION DETAILED: LEFT CENTRAL MALAR CHEEK

## 2020-02-13 ASSESSMENT — LOCATION ZONE DERM
LOCATION ZONE: SCALP
LOCATION ZONE: NECK
LOCATION ZONE: TRUNK
LOCATION ZONE: FACE
LOCATION ZONE: FACE
LOCATION ZONE: NECK

## 2020-02-13 ASSESSMENT — LOCATION SIMPLE DESCRIPTION DERM
LOCATION SIMPLE: CHEST
LOCATION SIMPLE: NECK
LOCATION SIMPLE: RIGHT CHEEK
LOCATION SIMPLE: LEFT CHEEK
LOCATION SIMPLE: LEFT CHEEK
LOCATION SIMPLE: RIGHT UPPER BACK
LOCATION SIMPLE: RIGHT CHEEK
LOCATION SIMPLE: NECK
LOCATION SIMPLE: POSTERIOR SCALP
LOCATION SIMPLE: RIGHT BREAST

## 2020-02-13 NOTE — PROCEDURE: LIQUID NITROGEN
Add 52 Modifier (Optional): no
Detail Level: Detailed
Number Of Freeze-Thaw Cycles: 2 freeze-thaw cycles
Medical Necessity Clause: This procedure was medically necessary because the lesions that were treated were:
Post-Care Instructions: I reviewed with the patient in detail post-care instructions. Patient is to wear sunprotection, and avoid picking at any of the treated lesions. Pt may apply Vaseline to crusted or scabbing areas.
Medical Necessity Information: It is in your best interest to select a reason for this procedure from the list below. All of these items fulfill various CMS LCD requirements except the new and changing color options.
Include Z78.9 (Other Specified Conditions Influencing Health Status) As An Associated Diagnosis?: Yes
Duration Of Freeze Thaw-Cycle (Seconds): 3
Number Of Freeze-Thaw Cycles: 3 freeze-thaw cycles
Consent: The patient's consent was obtained including but not limited to risks of crusting, scabbing, blistering, scarring, darker or lighter pigmentary change, recurrence, incomplete removal and infection.
Duration Of Freeze Thaw-Cycle (Seconds): 2

## 2020-02-13 NOTE — PROCEDURE: BENIGN DESTRUCTION
Add 52 Modifier (Optional): no
Post-Care Instructions: I reviewed with the patient in detail post-care instructions. Patient is to wear sunprotection, and avoid picking at any of the treated lesions. Pt may apply Vaseline to crusted or scabbing areas.
Include Z78.9 (Other Specified Conditions Influencing Health Status) As An Associated Diagnosis?: Yes
Consent: The patient's consent was obtained including but not limited to risks of crusting, scabbing, blistering, scarring, darker or lighter pigmentary change, recurrence, incomplete removal and infection.
Detail Level: Zone
Treatment Number (Will Not Render If 0): 0
Medical Necessity Information: It is in your best interest to select a reason for this procedure from the list below. All of these items fulfill various CMS LCD requirements except the new and changing color options.
Medical Necessity Clause: This procedure was medically necessary because the lesions that were treated were:

## 2020-10-02 ENCOUNTER — HOSPITAL ENCOUNTER (OUTPATIENT)
Dept: MAMMOGRAPHY | Age: 75
Discharge: HOME | End: 2020-10-02
Attending: FAMILY MEDICINE
Payer: MEDICARE

## 2020-10-02 DIAGNOSIS — Z78.0 POSTMENOPAUSAL: ICD-10-CM

## 2020-10-02 PROCEDURE — 77080 DXA BONE DENSITY AXIAL: CPT

## 2021-03-29 ENCOUNTER — APPOINTMENT (RX ONLY)
Dept: URBAN - METROPOLITAN AREA CLINIC 349 | Facility: CLINIC | Age: 76
Setting detail: DERMATOLOGY
End: 2021-03-29

## 2021-03-29 DIAGNOSIS — Z12.83 ENCOUNTER FOR SCREENING FOR MALIGNANT NEOPLASM OF SKIN: ICD-10-CM

## 2021-03-29 DIAGNOSIS — L92.8 OTHER GRANULOMATOUS DISORDERS OF THE SKIN AND SUBCUTANEOUS TISSUE: ICD-10-CM

## 2021-03-29 DIAGNOSIS — D22 MELANOCYTIC NEVI: ICD-10-CM | Status: STABLE

## 2021-03-29 DIAGNOSIS — L82.1 OTHER SEBORRHEIC KERATOSIS: ICD-10-CM | Status: STABLE

## 2021-03-29 DIAGNOSIS — Z71.89 OTHER SPECIFIED COUNSELING: ICD-10-CM

## 2021-03-29 DIAGNOSIS — L57.0 ACTINIC KERATOSIS: ICD-10-CM

## 2021-03-29 DIAGNOSIS — D18.0 HEMANGIOMA: ICD-10-CM | Status: STABLE

## 2021-03-29 PROBLEM — D22.5 MELANOCYTIC NEVI OF TRUNK: Status: ACTIVE | Noted: 2021-03-29

## 2021-03-29 PROBLEM — D18.01 HEMANGIOMA OF SKIN AND SUBCUTANEOUS TISSUE: Status: ACTIVE | Noted: 2021-03-29

## 2021-03-29 PROBLEM — L98.8 OTHER SPECIFIED DISORDERS OF THE SKIN AND SUBCUTANEOUS TISSUE: Status: ACTIVE | Noted: 2021-03-29

## 2021-03-29 PROBLEM — K21.9 GASTRO-ESOPHAGEAL REFLUX DISEASE WITHOUT ESOPHAGITIS: Status: ACTIVE | Noted: 2021-03-29

## 2021-03-29 PROCEDURE — ? EDUCATIONAL RESOURCES PROVIDED

## 2021-03-29 PROCEDURE — ? LIQUID NITROGEN

## 2021-03-29 PROCEDURE — 99213 OFFICE O/P EST LOW 20 MIN: CPT | Mod: 25

## 2021-03-29 PROCEDURE — 17003 DESTRUCT PREMALG LES 2-14: CPT

## 2021-03-29 PROCEDURE — ? TREATMENT REGIMEN

## 2021-03-29 PROCEDURE — ? COUNSELING

## 2021-03-29 PROCEDURE — 17000 DESTRUCT PREMALG LESION: CPT

## 2021-03-29 ASSESSMENT — LOCATION DETAILED DESCRIPTION DERM
LOCATION DETAILED: LEFT RADIAL DORSAL HAND
LOCATION DETAILED: RIGHT MEDIAL UPPER BACK
LOCATION DETAILED: RIGHT SUPERIOR MEDIAL UPPER BACK
LOCATION DETAILED: RIGHT MID-UPPER BACK
LOCATION DETAILED: RIGHT MEDIAL SUPERIOR CHEST
LOCATION DETAILED: SUPERIOR THORACIC SPINE
LOCATION DETAILED: INFERIOR THORACIC SPINE
LOCATION DETAILED: LEFT MEDIAL SUPERIOR CHEST
LOCATION DETAILED: LEFT ULNAR DORSAL HAND
LOCATION DETAILED: RIGHT MEDIAL BREAST 2-3:00 REGION
LOCATION DETAILED: RIGHT OCCIPITAL SCALP

## 2021-03-29 ASSESSMENT — LOCATION SIMPLE DESCRIPTION DERM
LOCATION SIMPLE: RIGHT BREAST
LOCATION SIMPLE: UPPER BACK
LOCATION SIMPLE: POSTERIOR SCALP
LOCATION SIMPLE: RIGHT UPPER BACK
LOCATION SIMPLE: LEFT HAND
LOCATION SIMPLE: CHEST

## 2021-03-29 ASSESSMENT — LOCATION ZONE DERM
LOCATION ZONE: HAND
LOCATION ZONE: TRUNK
LOCATION ZONE: SCALP

## 2021-03-29 NOTE — PROCEDURE: LIQUID NITROGEN
Post-Care Instructions: I reviewed with the patient in detail post-care instructions. Patient is to wear sunprotection, and avoid picking at any of the treated lesions. Pt may apply Vaseline to crusted or scabbing areas.
Render Post-Care Instructions In Note?: no
Duration Of Freeze Thaw-Cycle (Seconds): 3
Consent: The patient's consent was obtained including but not limited to risks of crusting, scabbing, blistering, scarring, darker or lighter pigmentary change, recurrence, incomplete removal and infection.
Number Of Freeze-Thaw Cycles: 2 freeze-thaw cycles
Detail Level: Detailed

## 2021-05-12 RX ORDER — CEFAZOLIN SODIUM/WATER 2 G/20 ML
2 SYRINGE (ML) INTRAVENOUS ONCE
Status: CANCELLED | OUTPATIENT
Start: 2021-05-12 | End: 2021-05-12

## 2021-05-26 ENCOUNTER — HOSPITAL ENCOUNTER (OUTPATIENT)
Dept: SURGERY | Age: 76
Discharge: HOME | End: 2021-05-26
Payer: MEDICARE

## 2021-05-26 VITALS
RESPIRATION RATE: 18 BRPM | TEMPERATURE: 97.1 F | HEART RATE: 65 BPM | OXYGEN SATURATION: 98 % | WEIGHT: 129.1 LBS | HEIGHT: 63 IN | DIASTOLIC BLOOD PRESSURE: 59 MMHG | BODY MASS INDEX: 22.88 KG/M2 | SYSTOLIC BLOOD PRESSURE: 112 MMHG

## 2021-05-26 LAB
ANION GAP SERPL CALC-SCNC: 10 MMOL/L (ref 7–16)
APPEARANCE UR: CLEAR
BACTERIA SPEC CULT: ABNORMAL
BACTERIA URNS QL MICRO: ABNORMAL /HPF
BASOPHILS # BLD: 0.1 K/UL (ref 0–0.2)
BASOPHILS NFR BLD: 1 % (ref 0–2)
BILIRUB UR QL: NEGATIVE
BUN SERPL-MCNC: 23 MG/DL (ref 8–23)
CALCIUM SERPL-MCNC: 8.9 MG/DL (ref 8.3–10.4)
CHLORIDE SERPL-SCNC: 105 MMOL/L (ref 98–107)
CO2 SERPL-SCNC: 25 MMOL/L (ref 21–32)
COLOR UR: YELLOW
CREAT SERPL-MCNC: 0.65 MG/DL (ref 0.6–1)
DIFFERENTIAL METHOD BLD: ABNORMAL
EOSINOPHIL # BLD: 0.1 K/UL (ref 0–0.8)
EOSINOPHIL NFR BLD: 1 % (ref 0.5–7.8)
EPI CELLS #/AREA URNS HPF: ABNORMAL /HPF
ERYTHROCYTE [DISTWIDTH] IN BLOOD BY AUTOMATED COUNT: 13.6 % (ref 11.9–14.6)
GLUCOSE SERPL-MCNC: 87 MG/DL (ref 65–100)
GLUCOSE UR STRIP.AUTO-MCNC: NEGATIVE MG/DL
HCT VFR BLD AUTO: 39.8 % (ref 35.8–46.3)
HGB BLD-MCNC: 12.5 G/DL (ref 11.7–15.4)
HGB UR QL STRIP: ABNORMAL
IMM GRANULOCYTES # BLD AUTO: 0 K/UL (ref 0–0.5)
IMM GRANULOCYTES NFR BLD AUTO: 0 % (ref 0–5)
KETONES UR QL STRIP.AUTO: NEGATIVE MG/DL
LEUKOCYTE ESTERASE UR QL STRIP.AUTO: NEGATIVE
LYMPHOCYTES # BLD: 1.2 K/UL (ref 0.5–4.6)
LYMPHOCYTES NFR BLD: 15 % (ref 13–44)
MCH RBC QN AUTO: 29.4 PG (ref 26.1–32.9)
MCHC RBC AUTO-ENTMCNC: 31.4 G/DL (ref 31.4–35)
MCV RBC AUTO: 93.6 FL (ref 79.6–97.8)
MONOCYTES # BLD: 0.4 K/UL (ref 0.1–1.3)
MONOCYTES NFR BLD: 5 % (ref 4–12)
NEUTS SEG # BLD: 6.3 K/UL (ref 1.7–8.2)
NEUTS SEG NFR BLD: 79 % (ref 43–78)
NITRITE UR QL STRIP.AUTO: NEGATIVE
NRBC # BLD: 0 K/UL (ref 0–0.2)
OTHER OBSERVATIONS,UCOM: ABNORMAL
PH UR STRIP: 7 [PH] (ref 5–9)
PLATELET # BLD AUTO: 240 K/UL (ref 150–450)
PMV BLD AUTO: 9.3 FL (ref 9.4–12.3)
POTASSIUM SERPL-SCNC: 4 MMOL/L (ref 3.5–5.1)
PROT UR STRIP-MCNC: NEGATIVE MG/DL
RBC # BLD AUTO: 4.25 M/UL (ref 4.05–5.2)
RBC #/AREA URNS HPF: ABNORMAL /HPF
SERVICE CMNT-IMP: ABNORMAL
SODIUM SERPL-SCNC: 140 MMOL/L (ref 136–145)
SP GR UR REFRACTOMETRY: 1 (ref 1–1.02)
UROBILINOGEN UR QL STRIP.AUTO: 0.2 EU/DL (ref 0.2–1)
WBC # BLD AUTO: 8 K/UL (ref 4.3–11.1)
WBC URNS QL MICRO: ABNORMAL /HPF

## 2021-05-26 PROCEDURE — 80048 BASIC METABOLIC PNL TOTAL CA: CPT

## 2021-05-26 PROCEDURE — 81001 URINALYSIS AUTO W/SCOPE: CPT

## 2021-05-26 PROCEDURE — 85025 COMPLETE CBC W/AUTO DIFF WBC: CPT

## 2021-05-26 PROCEDURE — 87641 MR-STAPH DNA AMP PROBE: CPT

## 2021-05-26 NOTE — PERIOP NOTES
Recent Results (from the past 12 hour(s))   MSSA/MRSA SC BY PCR, NASAL SWAB    Collection Time: 05/26/21  9:35 AM    Specimen: Nasal   Result Value Ref Range    Special Requests: NO SPECIAL REQUESTS      Culture result: (A)       MRSA target DNA not detected, SA target DNA detected. A MRSA negative, SA positive test result does not preclude MRSA nasal colonization. CBC WITH AUTOMATED DIFF    Collection Time: 05/26/21  9:35 AM   Result Value Ref Range    WBC 8.0 4.3 - 11.1 K/uL    RBC 4.25 4.05 - 5.2 M/uL    HGB 12.5 11.7 - 15.4 g/dL    HCT 39.8 35.8 - 46.3 %    MCV 93.6 79.6 - 97.8 FL    MCH 29.4 26.1 - 32.9 PG    MCHC 31.4 31.4 - 35.0 g/dL    RDW 13.6 11.9 - 14.6 %    PLATELET 817 920 - 023 K/uL    MPV 9.3 (L) 9.4 - 12.3 FL    ABSOLUTE NRBC 0.00 0.0 - 0.2 K/uL    DF AUTOMATED      NEUTROPHILS 79 (H) 43 - 78 %    LYMPHOCYTES 15 13 - 44 %    MONOCYTES 5 4.0 - 12.0 %    EOSINOPHILS 1 0.5 - 7.8 %    BASOPHILS 1 0.0 - 2.0 %    IMMATURE GRANULOCYTES 0 0.0 - 5.0 %    ABS. NEUTROPHILS 6.3 1.7 - 8.2 K/UL    ABS. LYMPHOCYTES 1.2 0.5 - 4.6 K/UL    ABS. MONOCYTES 0.4 0.1 - 1.3 K/UL    ABS. EOSINOPHILS 0.1 0.0 - 0.8 K/UL    ABS. BASOPHILS 0.1 0.0 - 0.2 K/UL    ABS. IMM.  GRANS. 0.0 0.0 - 0.5 K/UL   METABOLIC PANEL, BASIC    Collection Time: 05/26/21  9:35 AM   Result Value Ref Range    Sodium 140 136 - 145 mmol/L    Potassium 4.0 3.5 - 5.1 mmol/L    Chloride 105 98 - 107 mmol/L    CO2 25 21 - 32 mmol/L    Anion gap 10 7 - 16 mmol/L    Glucose 87 65 - 100 mg/dL    BUN 23 8 - 23 MG/DL    Creatinine 0.65 0.6 - 1.0 MG/DL    GFR est AA >60 >60 ml/min/1.73m2    GFR est non-AA >60 >60 ml/min/1.73m2    Calcium 8.9 8.3 - 10.4 MG/DL   URINALYSIS W/ RFLX MICROSCOPIC    Collection Time: 05/26/21  9:35 AM   Result Value Ref Range    Color YELLOW      Appearance CLEAR      Specific gravity 1.004 1.001 - 1.023      pH (UA) 7.0 5.0 - 9.0      Protein Negative NEG mg/dL    Glucose Negative mg/dL    Ketone Negative NEG mg/dL    Bilirubin Negative NEG      Blood SMALL (A) NEG      Urobilinogen 0.2 0.2 - 1.0 EU/dL    Nitrites Negative NEG      Leukocyte Esterase Negative NEG      WBC 0-3 0 /hpf    RBC 10-20 0 /hpf    Epithelial cells 0-3 0 /hpf    Bacteria TRACE 0 /hpf    Other observations RESULTS VERIFIED MANUALLY     Reviewed

## 2021-05-26 NOTE — PERIOP NOTES
Patient verified name and     Order for consent  found in EHR and matches case posting; patient verified. Type 2 surgery, phone assessment complete. Labs per surgeon: MRSA; results pending  Labs per anesthesia protocol: cbc,bmp,mrsa,ua; results pending  EKG: not needed at time of PAT. No hx CAD,DM or CVA    Covid vaccine series completed 2021. Hospital approved surgical skin cleanser and instructions given per hospital policy. Patient provided with and instructed on educational handouts including Guide to Surgery, Pain Management, Hand Hygiene, Blood Transfusion Education, and [facility] Brochure. Patient answered medical/surgical history questions at their best of ability. All prior to admission medications documented in [facility]. Original medication prescription bottle not visualized during patient appointment. Patient instructed to hold all vitamins 7 days prior to surgery and NSAIDS 5 days prior to surgery, patient verbalized understanding. Patient teach back successful and patient demonstrates knowledge of instructions.

## 2021-05-26 NOTE — PERIOP NOTES
PLEASE CONTINUE TAKING ALL PRESCRIPTION MEDICATIONS UP TO THE DAY OF SURGERY UNLESS OTHERWISE DIRECTED BELOW. DISCONTINUE all vitamins and supplements 7 days prior to surgery. DISCONTINUE Non-Steriodal Anti-Inflammatory (NSAIDS) such as Advil and Aleve 5 days prior to surgery. Home Medications to take  the day of surgery    Oxybutynin, Pantoprazole, Paroxetine            Home Medications   to Hold   Biotin, Multivitamin, Meloxicam          Comments         *Visitor policy of 1 visitor per patient discussed. Please do not bring home medications with you on the day of surgery unless otherwise directed by your nurse. If you are instructed to bring home medications, please give them to your nurse as they will be administered by the nursing staff. If you have any questions, please call [facility] [phone] or [facility] [phone]. A copy of this note was provided to the patient for reference.

## 2021-07-27 RX ORDER — CEFAZOLIN SODIUM/WATER 2 G/20 ML
2 SYRINGE (ML) INTRAVENOUS ONCE
Status: CANCELLED | OUTPATIENT
Start: 2021-07-27 | End: 2021-07-27

## 2021-08-18 ENCOUNTER — HOSPITAL ENCOUNTER (OUTPATIENT)
Dept: SURGERY | Age: 76
Discharge: HOME | End: 2021-08-18
Payer: MEDICARE

## 2021-08-18 VITALS
TEMPERATURE: 97.9 F | HEIGHT: 63 IN | BODY MASS INDEX: 22.98 KG/M2 | HEART RATE: 66 BPM | SYSTOLIC BLOOD PRESSURE: 124 MMHG | DIASTOLIC BLOOD PRESSURE: 68 MMHG | OXYGEN SATURATION: 99 % | RESPIRATION RATE: 18 BRPM | WEIGHT: 129.7 LBS

## 2021-08-18 LAB
ANION GAP SERPL CALC-SCNC: 3 MMOL/L (ref 7–16)
APPEARANCE UR: CLEAR
BACTERIA SPEC CULT: NORMAL
BACTERIA URNS QL MICRO: ABNORMAL /HPF
BASOPHILS # BLD: 0.1 K/UL (ref 0–0.2)
BASOPHILS NFR BLD: 1 % (ref 0–2)
BILIRUB UR QL: NEGATIVE
BUN SERPL-MCNC: 19 MG/DL (ref 8–23)
CALCIUM SERPL-MCNC: 9.7 MG/DL (ref 8.3–10.4)
CASTS URNS QL MICRO: 0 /LPF
CHLORIDE SERPL-SCNC: 111 MMOL/L (ref 98–107)
CO2 SERPL-SCNC: 29 MMOL/L (ref 21–32)
COLOR UR: YELLOW
CREAT SERPL-MCNC: 0.66 MG/DL (ref 0.6–1)
DIFFERENTIAL METHOD BLD: ABNORMAL
EOSINOPHIL # BLD: 0.2 K/UL (ref 0–0.8)
EOSINOPHIL NFR BLD: 3 % (ref 0.5–7.8)
EPI CELLS #/AREA URNS HPF: 0 /HPF
ERYTHROCYTE [DISTWIDTH] IN BLOOD BY AUTOMATED COUNT: 13.4 % (ref 11.9–14.6)
GLUCOSE SERPL-MCNC: 95 MG/DL (ref 65–100)
GLUCOSE UR STRIP.AUTO-MCNC: NEGATIVE MG/DL
HCT VFR BLD AUTO: 41.6 % (ref 35.8–46.3)
HGB BLD-MCNC: 13 G/DL (ref 11.7–15.4)
HGB UR QL STRIP: ABNORMAL
IMM GRANULOCYTES # BLD AUTO: 0 K/UL (ref 0–0.5)
IMM GRANULOCYTES NFR BLD AUTO: 0 % (ref 0–5)
KETONES UR QL STRIP.AUTO: NEGATIVE MG/DL
LEUKOCYTE ESTERASE UR QL STRIP.AUTO: ABNORMAL
LYMPHOCYTES # BLD: 1 K/UL (ref 0.5–4.6)
LYMPHOCYTES NFR BLD: 16 % (ref 13–44)
MCH RBC QN AUTO: 29.7 PG (ref 26.1–32.9)
MCHC RBC AUTO-ENTMCNC: 31.3 G/DL (ref 31.4–35)
MCV RBC AUTO: 95.2 FL (ref 79.6–97.8)
MONOCYTES # BLD: 0.3 K/UL (ref 0.1–1.3)
MONOCYTES NFR BLD: 5 % (ref 4–12)
NEUTS SEG # BLD: 4.9 K/UL (ref 1.7–8.2)
NEUTS SEG NFR BLD: 75 % (ref 43–78)
NITRITE UR QL STRIP.AUTO: NEGATIVE
NRBC # BLD: 0 K/UL (ref 0–0.2)
PH UR STRIP: 7.5 [PH] (ref 5–9)
PLATELET # BLD AUTO: 293 K/UL (ref 150–450)
PMV BLD AUTO: 9.3 FL (ref 9.4–12.3)
POTASSIUM SERPL-SCNC: 4.7 MMOL/L (ref 3.5–5.1)
PROT UR STRIP-MCNC: NEGATIVE MG/DL
RBC # BLD AUTO: 4.37 M/UL (ref 4.05–5.2)
RBC #/AREA URNS HPF: ABNORMAL /HPF
SERVICE CMNT-IMP: NORMAL
SODIUM SERPL-SCNC: 143 MMOL/L (ref 136–145)
SP GR UR REFRACTOMETRY: 1.01 (ref 1–1.02)
UROBILINOGEN UR QL STRIP.AUTO: 0.2 EU/DL (ref 0.2–1)
WBC # BLD AUTO: 6.5 K/UL (ref 4.3–11.1)
WBC URNS QL MICRO: ABNORMAL /HPF

## 2021-08-18 PROCEDURE — 81001 URINALYSIS AUTO W/SCOPE: CPT

## 2021-08-18 PROCEDURE — 85025 COMPLETE CBC W/AUTO DIFF WBC: CPT

## 2021-08-18 PROCEDURE — 80048 BASIC METABOLIC PNL TOTAL CA: CPT

## 2021-08-18 PROCEDURE — 87641 MR-STAPH DNA AMP PROBE: CPT

## 2021-08-18 NOTE — PERIOP NOTES
Patient verified name, , and surgery as listed in [facility]. Patient provided medical/health information and PTA medications to the best of their ability. TYPE  CASE: III  Orders per surgeon: were received  Labs per surgeon: MSSA/MRSA, CBC w/, BMP, UA    Labs per anesthesia protocol: Type & Screen DOS  EKG:  Not required    Patient COVID test date 21; Patient aware of  the appointment. The testing center is located at the . [address], [place]. If appointment is needed patient provided telephone number of [phone]. Nasal Swab collected per MD order. Patient provided with and instructed on education handouts including Guide to Surgery, blood transfusions, pain management, and hand hygiene for the family and community, and [facility] brochure. Road to Recovery Spine surgery patient guide given. Instructed on incentive spirometry with return demonstration. Patient viewed spine prehab video. Hibiclens and instructions given per hospital policy. Original medication prescription bottles were visualized during patient appointment. Patient teach back successful and patient demonstrates knowledge of instruction.

## 2021-08-18 NOTE — PERIOP NOTES
PLEASE CONTINUE TAKING ALL PRESCRIPTION MEDICATIONS UP TO THE DAY OF SURGERY UNLESS OTHERWISE DIRECTED BELOW. DISCONTINUE all vitamins and supplements 7 days prior to surgery. DISCONTINUE Non-Steriodal Anti-Inflammatory (NSAIDS) such as Advil and Aleve 5 days prior to surgery. Home Medications to take  the day of surgery    Oxybutynin (Ditropan)   Pantoprazole (Protonix)   Paroxetine (Paxil)          Home Medications   to Hold   Meloxicam (Mobic)  Biotin        Comments    Covid test 8/23/21 @ [address] [place], [place]    On the day before surgery please take Acetaminophen 1000mg in the morning and then again before bed. You may substitute for Tylenol 650 mg. How to Use Your Incentive Spirometer       About Your Incentive Spirometer  An incentive spirometer is a device that will expand your lungs by helping you to breathe more deeply and fully. The parts of your incentive spirometer are labeled in Figure 1. Using your incentive spirometer  When youre using your incentive spirometer, make sure to breathe through your mouth. If you breathe through your nose, the incentive spirometer wont work properly. You can hold your nose if you have trouble. DO NOT BLOW INTO THE DEVICE. If you feel dizzy at any time, stop and rest. Try again at a later time. 1. Sit upright in a chair or in bed. Hold the incentive spirometer at eye level. 2. Put the mouthpiece in your mouth and close your lips tightly around it. Slowly breathe out (exhale) completely. 3. Breathe in (inhale) slowly through your mouth as deeply as you can. As you take the breath, you will see the piston rise inside the large column. While the piston rises, the indicator on the right should move upwards. It should stay in between the 2 arrows (see Figure 1). 4. Try to get the piston as high as you can, while keeping the indicator between the arrows.  If the indicator doesnt stay between the arrows, youre breathing either too fast or too slow.  5. When you get it as high as you can, hold your breath for 10 seconds, or as long as possible. While youre holding your breath, the piston will slowly fall to the base of the spirometer. 6. Once the piston reaches the bottom of the spirometer, breathe out slowly through your mouth. Rest for a few seconds. 7. Repeat 10 times. Try to get the piston to the same level with each breath. 8. After each set of 10 breaths, try to cough as coughing will help loosen or clear any mucus in your lungs. 9. Put the marker at the level the piston reached on your incentive spirometer. This will be your goal next time. Repeat these steps every hour that youre awake. Cover the mouthpiece of the incentive spirometer when you arent using it    Please do not bring home medications with you on the day of surgery unless otherwise directed by your nurse. If you are instructed to bring home medications, please give them to your nurse as they will be administered by the nursing staff. If you have any questions, please call [facility] [phone] or [facility] [phone]. A copy of this note was provided to the patient for reference.

## 2021-08-18 NOTE — PERIOP NOTES
Recent Results (from the past 12 hour(s))   MSSA/MRSA SC BY PCR, NASAL SWAB    Collection Time: 08/18/21 11:13 AM    Specimen: Nasal   Result Value Ref Range    Special Requests: NO SPECIAL REQUESTS      Culture result:        SA target not detected. A MRSA NEGATIVE, SA NEGATIVE test result does not preclude MRSA or SA nasal colonization. CBC WITH AUTOMATED DIFF    Collection Time: 08/18/21 11:13 AM   Result Value Ref Range    WBC 6.5 4.3 - 11.1 K/uL    RBC 4.37 4.05 - 5.2 M/uL    HGB 13.0 11.7 - 15.4 g/dL    HCT 41.6 35.8 - 46.3 %    MCV 95.2 79.6 - 97.8 FL    MCH 29.7 26.1 - 32.9 PG    MCHC 31.3 (L) 31.4 - 35.0 g/dL    RDW 13.4 11.9 - 14.6 %    PLATELET 129 032 - 772 K/uL    MPV 9.3 (L) 9.4 - 12.3 FL    ABSOLUTE NRBC 0.00 0.0 - 0.2 K/uL    DF AUTOMATED      NEUTROPHILS 75 43 - 78 %    LYMPHOCYTES 16 13 - 44 %    MONOCYTES 5 4.0 - 12.0 %    EOSINOPHILS 3 0.5 - 7.8 %    BASOPHILS 1 0.0 - 2.0 %    IMMATURE GRANULOCYTES 0 0.0 - 5.0 %    ABS. NEUTROPHILS 4.9 1.7 - 8.2 K/UL    ABS. LYMPHOCYTES 1.0 0.5 - 4.6 K/UL    ABS. MONOCYTES 0.3 0.1 - 1.3 K/UL    ABS. EOSINOPHILS 0.2 0.0 - 0.8 K/UL    ABS. BASOPHILS 0.1 0.0 - 0.2 K/UL    ABS. IMM.  GRANS. 0.0 0.0 - 0.5 K/UL   METABOLIC PANEL, BASIC    Collection Time: 08/18/21 11:13 AM   Result Value Ref Range    Sodium 143 136 - 145 mmol/L    Potassium 4.7 3.5 - 5.1 mmol/L    Chloride 111 (H) 98 - 107 mmol/L    CO2 29 21 - 32 mmol/L    Anion gap 3 (L) 7 - 16 mmol/L    Glucose 95 65 - 100 mg/dL    BUN 19 8 - 23 MG/DL    Creatinine 0.66 0.6 - 1.0 MG/DL    GFR est AA >60 >60 ml/min/1.73m2    GFR est non-AA >60 >60 ml/min/1.73m2    Calcium 9.7 8.3 - 10.4 MG/DL   URINALYSIS W/ RFLX MICROSCOPIC    Collection Time: 08/18/21 11:13 AM   Result Value Ref Range    Color YELLOW      Appearance CLEAR      Specific gravity 1.010 1.001 - 1.023      pH (UA) 7.5 5.0 - 9.0      Protein Negative NEG mg/dL    Glucose Negative mg/dL    Ketone Negative NEG mg/dL Bilirubin Negative NEG      Blood TRACE (A) NEG      Urobilinogen 0.2 0.2 - 1.0 EU/dL    Nitrites Negative NEG      Leukocyte Esterase TRACE (A) NEG      WBC 0-3 0 /hpf    RBC 10-20 0 /hpf    Epithelial cells 0 0 /hpf    Bacteria TRACE 0 /hpf    Casts 0 0 /lpf   Labs reviewed and routed to Dr. Maryam Feldman. Called Dr.Van Crabtree's office spoke with Susu Smith. Message was left for Jose Villalobos RN that MSSA/MRSA swab resulted SA Target DNA Detected.

## 2021-08-24 ENCOUNTER — ANESTHESIA EVENT (OUTPATIENT)
Dept: SURGERY | Age: 76
End: 2021-08-24
Payer: MEDICARE

## 2021-08-25 ENCOUNTER — APPOINTMENT (OUTPATIENT)
Dept: GENERAL RADIOLOGY | Age: 76
End: 2021-08-25
Attending: ORTHOPAEDIC SURGERY
Payer: MEDICARE

## 2021-08-25 ENCOUNTER — HOSPITAL ENCOUNTER (OUTPATIENT)
Age: 76
Setting detail: OUTPATIENT SURGERY
Discharge: HOME | End: 2021-08-25
Attending: ORTHOPAEDIC SURGERY | Admitting: ORTHOPAEDIC SURGERY
Payer: MEDICARE

## 2021-08-25 ENCOUNTER — ANESTHESIA (OUTPATIENT)
Dept: SURGERY | Age: 76
End: 2021-08-25
Payer: MEDICARE

## 2021-08-25 VITALS
BODY MASS INDEX: 23.04 KG/M2 | HEART RATE: 68 BPM | OXYGEN SATURATION: 94 % | HEIGHT: 63 IN | WEIGHT: 130 LBS | SYSTOLIC BLOOD PRESSURE: 157 MMHG | RESPIRATION RATE: 17 BRPM | DIASTOLIC BLOOD PRESSURE: 84 MMHG | TEMPERATURE: 98 F

## 2021-08-25 DIAGNOSIS — M48.02 CERVICAL SPINAL STENOSIS: Primary | ICD-10-CM

## 2021-08-25 LAB
ABO + RH BLD: NORMAL
BLOOD GROUP ANTIBODIES SERPL: NORMAL
SPECIMEN EXP DATE BLD: NORMAL

## 2021-08-25 PROCEDURE — 76060000033 HC ANESTHESIA 1 TO 1.5 HR: Performed by: ORTHOPAEDIC SURGERY

## 2021-08-25 PROCEDURE — C1713 ANCHOR/SCREW BN/BN,TIS/BN: HCPCS | Performed by: ORTHOPAEDIC SURGERY

## 2021-08-25 PROCEDURE — 76210000006 HC OR PH I REC 0.5 TO 1 HR: Performed by: ORTHOPAEDIC SURGERY

## 2021-08-25 PROCEDURE — 77030029099 HC BN WAX SSPC -A: Performed by: ORTHOPAEDIC SURGERY

## 2021-08-25 PROCEDURE — 77030018673: Performed by: ORTHOPAEDIC SURGERY

## 2021-08-25 PROCEDURE — C1889 IMPLANT/INSERT DEVICE, NOC: HCPCS | Performed by: ORTHOPAEDIC SURGERY

## 2021-08-25 PROCEDURE — 77030003666 HC NDL SPINAL BD -A: Performed by: ORTHOPAEDIC SURGERY

## 2021-08-25 PROCEDURE — 74011250636 HC RX REV CODE- 250/636: Performed by: ORTHOPAEDIC SURGERY

## 2021-08-25 PROCEDURE — 76010000161 HC OR TIME 1 TO 1.5 HR INTENSV-TIER 1: Performed by: ORTHOPAEDIC SURGERY

## 2021-08-25 PROCEDURE — 77030019908 HC STETH ESOPH SIMS -A: Performed by: NURSE ANESTHETIST, CERTIFIED REGISTERED

## 2021-08-25 PROCEDURE — 74011250637 HC RX REV CODE- 250/637: Performed by: ORTHOPAEDIC SURGERY

## 2021-08-25 PROCEDURE — 74011250636 HC RX REV CODE- 250/636: Performed by: NURSE ANESTHETIST, CERTIFIED REGISTERED

## 2021-08-25 PROCEDURE — 76210000021 HC REC RM PH II 0.5 TO 1 HR: Performed by: ORTHOPAEDIC SURGERY

## 2021-08-25 PROCEDURE — 22845 INSERT SPINE FIXATION DEVICE: CPT | Performed by: ORTHOPAEDIC SURGERY

## 2021-08-25 PROCEDURE — 77030012894: Performed by: ORTHOPAEDIC SURGERY

## 2021-08-25 PROCEDURE — 74011000250 HC RX REV CODE- 250: Performed by: ORTHOPAEDIC SURGERY

## 2021-08-25 PROCEDURE — 74011000272 HC RX REV CODE- 272: Performed by: ORTHOPAEDIC SURGERY

## 2021-08-25 PROCEDURE — 77030025623 HC BUR RND PRECIS STRY -D: Performed by: ORTHOPAEDIC SURGERY

## 2021-08-25 PROCEDURE — 22551 ARTHRD ANT NTRBDY CERVICAL: CPT | Performed by: ORTHOPAEDIC SURGERY

## 2021-08-25 PROCEDURE — 77030037088 HC TUBE ENDOTRACH ORAL NSL COVD-A: Performed by: NURSE ANESTHETIST, CERTIFIED REGISTERED

## 2021-08-25 PROCEDURE — 2709999900 HC NON-CHARGEABLE SUPPLY: Performed by: ORTHOPAEDIC SURGERY

## 2021-08-25 PROCEDURE — 72020 X-RAY EXAM OF SPINE 1 VIEW: CPT

## 2021-08-25 PROCEDURE — 74011250636 HC RX REV CODE- 250/636: Performed by: ANESTHESIOLOGY

## 2021-08-25 PROCEDURE — 77030039425 HC BLD LARYNG TRULITE DISP TELE -A: Performed by: NURSE ANESTHETIST, CERTIFIED REGISTERED

## 2021-08-25 PROCEDURE — 77030011265 HC ELECTRD BLD HEX COVD -A: Performed by: ORTHOPAEDIC SURGERY

## 2021-08-25 PROCEDURE — 77030034479 HC ADH SKN CLSR PRINEO J&J -B: Performed by: ORTHOPAEDIC SURGERY

## 2021-08-25 PROCEDURE — 77030028270 HC SRGFL HEMSTAT MTRX J&J -C: Performed by: ORTHOPAEDIC SURGERY

## 2021-08-25 PROCEDURE — 77030034475 HC MISC IMPL SPN: Performed by: ORTHOPAEDIC SURGERY

## 2021-08-25 PROCEDURE — 77030040361 HC SLV COMPR DVT MDII -B: Performed by: ORTHOPAEDIC SURGERY

## 2021-08-25 PROCEDURE — 86901 BLOOD TYPING SEROLOGIC RH(D): CPT

## 2021-08-25 PROCEDURE — 77030031139 HC SUT VCRL2 J&J -A: Performed by: ORTHOPAEDIC SURGERY

## 2021-08-25 PROCEDURE — 74011250637 HC RX REV CODE- 250/637: Performed by: ANESTHESIOLOGY

## 2021-08-25 PROCEDURE — 74011000250 HC RX REV CODE- 250: Performed by: NURSE ANESTHETIST, CERTIFIED REGISTERED

## 2021-08-25 PROCEDURE — 22853 INSJ BIOMECHANICAL DEVICE: CPT | Performed by: ORTHOPAEDIC SURGERY

## 2021-08-25 PROCEDURE — 77030020268 HC MISC GENERAL SUPPLY: Performed by: ORTHOPAEDIC SURGERY

## 2021-08-25 DEVICE — BIO DBM PUTTY
Type: IMPLANTABLE DEVICE | Site: SPINE CERVICAL | Status: FUNCTIONAL
Brand: BIO DBM

## 2021-08-25 DEVICE — SCREW SPNL L14MM DIA4MM SELF STARTING VAR ANT CERV TI OZARK: Type: IMPLANTABLE DEVICE | Site: SPINE CERVICAL | Status: FUNCTIONAL

## 2021-08-25 DEVICE — PLATE ANT CERV CONSTRND 1 LVL 20MM OZARK VIEW: Type: IMPLANTABLE DEVICE | Site: SPINE CERVICAL | Status: FUNCTIONAL

## 2021-08-25 DEVICE — ANTERIOR CERVICAL CAGE
Type: IMPLANTABLE DEVICE | Site: SPINE CERVICAL | Status: FUNCTIONAL
Brand: TRITANIUM C

## 2021-08-25 RX ORDER — SODIUM CHLORIDE, SODIUM LACTATE, POTASSIUM CHLORIDE, CALCIUM CHLORIDE 600; 310; 30; 20 MG/100ML; MG/100ML; MG/100ML; MG/100ML
100 INJECTION, SOLUTION INTRAVENOUS CONTINUOUS
Status: DISCONTINUED | OUTPATIENT
Start: 2021-08-25 | End: 2021-08-25 | Stop reason: HOSPADM

## 2021-08-25 RX ORDER — ONDANSETRON 2 MG/ML
INJECTION INTRAMUSCULAR; INTRAVENOUS AS NEEDED
Status: DISCONTINUED | OUTPATIENT
Start: 2021-08-25 | End: 2021-08-25 | Stop reason: HOSPADM

## 2021-08-25 RX ORDER — GLYCOPYRROLATE 0.2 MG/ML
INJECTION INTRAMUSCULAR; INTRAVENOUS AS NEEDED
Status: DISCONTINUED | OUTPATIENT
Start: 2021-08-25 | End: 2021-08-25 | Stop reason: HOSPADM

## 2021-08-25 RX ORDER — OXYCODONE HYDROCHLORIDE 5 MG/1
10 TABLET ORAL
Status: COMPLETED | OUTPATIENT
Start: 2021-08-25 | End: 2021-08-25

## 2021-08-25 RX ORDER — DIPHENHYDRAMINE HYDROCHLORIDE 50 MG/ML
12.5 INJECTION, SOLUTION INTRAMUSCULAR; INTRAVENOUS
Status: DISCONTINUED | OUTPATIENT
Start: 2021-08-25 | End: 2021-08-25 | Stop reason: HOSPADM

## 2021-08-25 RX ORDER — FENTANYL CITRATE 50 UG/ML
INJECTION, SOLUTION INTRAMUSCULAR; INTRAVENOUS AS NEEDED
Status: DISCONTINUED | OUTPATIENT
Start: 2021-08-25 | End: 2021-08-25 | Stop reason: HOSPADM

## 2021-08-25 RX ORDER — DEXAMETHASONE SODIUM PHOSPHATE 100 MG/10ML
INJECTION INTRAMUSCULAR; INTRAVENOUS AS NEEDED
Status: DISCONTINUED | OUTPATIENT
Start: 2021-08-25 | End: 2021-08-25 | Stop reason: HOSPADM

## 2021-08-25 RX ORDER — FENTANYL CITRATE 50 UG/ML
100 INJECTION, SOLUTION INTRAMUSCULAR; INTRAVENOUS ONCE
Status: DISCONTINUED | OUTPATIENT
Start: 2021-08-25 | End: 2021-08-25 | Stop reason: HOSPADM

## 2021-08-25 RX ORDER — PROPOFOL 10 MG/ML
INJECTION, EMULSION INTRAVENOUS AS NEEDED
Status: DISCONTINUED | OUTPATIENT
Start: 2021-08-25 | End: 2021-08-25 | Stop reason: HOSPADM

## 2021-08-25 RX ORDER — EPHEDRINE SULFATE/0.9% NACL/PF 50 MG/5 ML
SYRINGE (ML) INTRAVENOUS AS NEEDED
Status: DISCONTINUED | OUTPATIENT
Start: 2021-08-25 | End: 2021-08-25 | Stop reason: HOSPADM

## 2021-08-25 RX ORDER — LIDOCAINE HYDROCHLORIDE 10 MG/ML
0.1 INJECTION INFILTRATION; PERINEURAL AS NEEDED
Status: DISCONTINUED | OUTPATIENT
Start: 2021-08-25 | End: 2021-08-25 | Stop reason: HOSPADM

## 2021-08-25 RX ORDER — CEFAZOLIN SODIUM/WATER 2 G/20 ML
2 SYRINGE (ML) INTRAVENOUS ONCE
Status: COMPLETED | OUTPATIENT
Start: 2021-08-25 | End: 2021-08-25

## 2021-08-25 RX ORDER — ALBUTEROL SULFATE 0.83 MG/ML
2.5 SOLUTION RESPIRATORY (INHALATION) AS NEEDED
Status: DISCONTINUED | OUTPATIENT
Start: 2021-08-25 | End: 2021-08-25 | Stop reason: HOSPADM

## 2021-08-25 RX ORDER — OXYCODONE HYDROCHLORIDE 5 MG/1
5 TABLET ORAL
Qty: 30 TABLET | Refills: 0 | Status: SHIPPED | OUTPATIENT
Start: 2021-08-25 | End: 2021-08-30

## 2021-08-25 RX ORDER — HYDROMORPHONE HYDROCHLORIDE 2 MG/ML
0.5 INJECTION, SOLUTION INTRAMUSCULAR; INTRAVENOUS; SUBCUTANEOUS
Status: DISCONTINUED | OUTPATIENT
Start: 2021-08-25 | End: 2021-08-25 | Stop reason: HOSPADM

## 2021-08-25 RX ORDER — MIDAZOLAM HYDROCHLORIDE 1 MG/ML
2 INJECTION, SOLUTION INTRAMUSCULAR; INTRAVENOUS ONCE
Status: DISCONTINUED | OUTPATIENT
Start: 2021-08-25 | End: 2021-08-25 | Stop reason: HOSPADM

## 2021-08-25 RX ORDER — ONDANSETRON 2 MG/ML
4 INJECTION INTRAMUSCULAR; INTRAVENOUS ONCE
Status: DISCONTINUED | OUTPATIENT
Start: 2021-08-25 | End: 2021-08-25 | Stop reason: HOSPADM

## 2021-08-25 RX ORDER — NEOSTIGMINE METHYLSULFATE 1 MG/ML
INJECTION, SOLUTION INTRAVENOUS AS NEEDED
Status: DISCONTINUED | OUTPATIENT
Start: 2021-08-25 | End: 2021-08-25 | Stop reason: HOSPADM

## 2021-08-25 RX ORDER — NALOXONE HYDROCHLORIDE 0.4 MG/ML
0.1 INJECTION, SOLUTION INTRAMUSCULAR; INTRAVENOUS; SUBCUTANEOUS AS NEEDED
Status: DISCONTINUED | OUTPATIENT
Start: 2021-08-25 | End: 2021-08-25 | Stop reason: HOSPADM

## 2021-08-25 RX ORDER — MIDAZOLAM HYDROCHLORIDE 1 MG/ML
2 INJECTION, SOLUTION INTRAMUSCULAR; INTRAVENOUS
Status: DISCONTINUED | OUTPATIENT
Start: 2021-08-25 | End: 2021-08-25 | Stop reason: HOSPADM

## 2021-08-25 RX ORDER — OXYCODONE HYDROCHLORIDE 5 MG/1
5 TABLET ORAL
Status: DISCONTINUED | OUTPATIENT
Start: 2021-08-25 | End: 2021-08-25 | Stop reason: HOSPADM

## 2021-08-25 RX ORDER — LIDOCAINE HYDROCHLORIDE 20 MG/ML
INJECTION, SOLUTION EPIDURAL; INFILTRATION; INTRACAUDAL; PERINEURAL AS NEEDED
Status: DISCONTINUED | OUTPATIENT
Start: 2021-08-25 | End: 2021-08-25 | Stop reason: HOSPADM

## 2021-08-25 RX ORDER — ROCURONIUM BROMIDE 10 MG/ML
INJECTION, SOLUTION INTRAVENOUS AS NEEDED
Status: DISCONTINUED | OUTPATIENT
Start: 2021-08-25 | End: 2021-08-25 | Stop reason: HOSPADM

## 2021-08-25 RX ADMIN — FENTANYL CITRATE 50 MCG: 50 INJECTION INTRAMUSCULAR; INTRAVENOUS at 07:41

## 2021-08-25 RX ADMIN — HYDROMORPHONE HYDROCHLORIDE 0.5 MG: 2 INJECTION, SOLUTION INTRAMUSCULAR; INTRAVENOUS; SUBCUTANEOUS at 09:15

## 2021-08-25 RX ADMIN — OXYCODONE 10 MG: 5 TABLET ORAL at 09:08

## 2021-08-25 RX ADMIN — FENTANYL CITRATE 100 MCG: 50 INJECTION INTRAMUSCULAR; INTRAVENOUS at 07:13

## 2021-08-25 RX ADMIN — ROCURONIUM BROMIDE 50 MG: 10 INJECTION, SOLUTION INTRAVENOUS at 07:17

## 2021-08-25 RX ADMIN — LIDOCAINE HYDROCHLORIDE 100 MG: 20 INJECTION, SOLUTION EPIDURAL; INFILTRATION; INTRACAUDAL; PERINEURAL at 07:17

## 2021-08-25 RX ADMIN — HYDROMORPHONE HYDROCHLORIDE 0.5 MG: 2 INJECTION, SOLUTION INTRAMUSCULAR; INTRAVENOUS; SUBCUTANEOUS at 09:05

## 2021-08-25 RX ADMIN — DEXAMETHASONE SODIUM PHOSPHATE 10 MG: 10 INJECTION INTRAMUSCULAR; INTRAVENOUS at 07:36

## 2021-08-25 RX ADMIN — GLYCOPYRROLATE 0.6 MG: 0.2 INJECTION, SOLUTION INTRAMUSCULAR; INTRAVENOUS at 08:13

## 2021-08-25 RX ADMIN — SODIUM CHLORIDE, SODIUM LACTATE, POTASSIUM CHLORIDE, AND CALCIUM CHLORIDE 100 ML/HR: 600; 310; 30; 20 INJECTION, SOLUTION INTRAVENOUS at 05:53

## 2021-08-25 RX ADMIN — PROPOFOL 120 MG: 10 INJECTION, EMULSION INTRAVENOUS at 07:17

## 2021-08-25 RX ADMIN — Medication 3 AMPULE: at 05:46

## 2021-08-25 RX ADMIN — Medication 3 MG: at 08:13

## 2021-08-25 RX ADMIN — Medication 10 MG: at 07:31

## 2021-08-25 RX ADMIN — ONDANSETRON 4 MG: 2 INJECTION INTRAMUSCULAR; INTRAVENOUS at 08:11

## 2021-08-25 RX ADMIN — FENTANYL CITRATE 50 MCG: 50 INJECTION INTRAMUSCULAR; INTRAVENOUS at 08:16

## 2021-08-25 RX ADMIN — CEFAZOLIN 2 G: 1 INJECTION, POWDER, FOR SOLUTION INTRAVENOUS at 07:26

## 2021-08-25 RX ADMIN — HYDROMORPHONE HYDROCHLORIDE 0.5 MG: 2 INJECTION, SOLUTION INTRAMUSCULAR; INTRAVENOUS; SUBCUTANEOUS at 09:20

## 2021-08-25 RX ADMIN — Medication 5 MG: at 08:05

## 2021-08-25 RX ADMIN — HYDROMORPHONE HYDROCHLORIDE 0.5 MG: 2 INJECTION, SOLUTION INTRAMUSCULAR; INTRAVENOUS; SUBCUTANEOUS at 09:10

## 2021-08-25 RX ADMIN — PROPOFOL 20 MG: 10 INJECTION, EMULSION INTRAVENOUS at 08:13

## 2021-08-25 NOTE — OP NOTES
[address] [address]   [phone]    OPERATIVE REPORT  Patient ID:Lisseth Mckeon  [id number]  1945  67 y.o. DATE OF SURGERY: 8/25/2021    SURGEON: Patria Becerra M.D. PREOPERATIVE DIAGNOSIS:  C3 - C4 stenosis. POSTOPERATIVE DIAGNOSIS:  C3 - C4 stenosis. PROCEDURE:     1. Anterior cervical diskectomy and fusion C3 - C4.  (CPT 60453)     2. Anterior cervical instrumentation  C3 - C4.  (CPT 56575)     3. Insertion biomechanical device  C3 - C4 (CPT O9570388)    ANESTHESIA:  General.    ESTIMATED BLOOD LOSS:  5 cc    INTRAOPERATIVE COMPLICATIONS:  None. POSTOPERATIVE CONDITION:  Stable. IMPLANTS:   Implant Name Type Inv. Item Serial No.  Lot No. LRB No. Used Action   SERVICE FEE 1CC BIO DBM PTTY - [id number]  SERVICE FEE 1CC BIO DBM PTTY  [employer]WD [id number] N/A 1 Implanted   ProteiOS Growth Factor   [id number] BIOLOGICA  N/A 1 Implanted   CAGE SPNL [id number] 6DEG ANT CERV TRITANIUM C LORD - [id number]  CAGE SPNL [id number] 6DEG ANT CERV TRITANIUM C LORD  LYNN SPINE HOW_WD [id number] N/A 1 Implanted   PLATE ANT CERV CONSTRND 1 LVL 20MM OZARK VIEW - [id number]  PLATE ANT CERV CONSTRND 1 LVL 20MM OZARK VIEW  [id number]WD [id number] N/A 1 Implanted   SCREW SPNL L14MM DIA4MM SELF STARTING STEPHANIE ANT CERV TI OZARK - [id number]  SCREW SPNL L14MM DIA4MM SELF STARTING STEPHANIE ANT CERV TI OZARK  LYNN SPINE HOW_WD [id number] N/A 4 Implanted       INDICATIONS FOR PROCEDURE:  The patient has had persistent symptoms of cervical radiculopathy despite conservative treatment. The preoperative studies confirmed a concordant stenotic lesion resulting in neural inpingement. The risks, benefits and potential complications of the above listed procedures were discussed with the patient in detail and an informed consent was obtained.     DESCRIPTION OF PROCEDURE:  After adequate induction of general anesthesia the patient was positioned supine on the operating table. A shoulder roll was placed and the shoulders were taped caudally to facilitate intraoperative radiographic imaging. The neck was kept in a neutral position. Care was taken to pad all bony prominences. Preoperative antibiotics were given. The neck was prepped and draped in the usual sterile fashion. A time-out was called to confirm appropriate patient, proposed procedure and proposed incision site. With this confirmation an incision was created over the left anterior lateral aspect of the neck centered near the cricoid cartilage. Dissection was carried down through the platysma using electrocautery. A Alonzo-Caldwell approach was then performed down to the anterior cervical spine. A spinal needle was inserted in an interspace and a cross-table lateral fluoroscopic image was obtained. The appropriate level was marked with electrocautery and the spinal needle was removed. At this point the longus colli was elevated around the periphery of the appropriate disk space and Rowe retractors were  inserted beneath the longus colli. Rowe pins were then inserted in the C3 and C4 vertebral bodies and distraction applied across the annulus fibrosus. The operating microscope was draped and brought to the sterile field. An annulotomy was performed with a 15 blade and a complete diskectomy was performed using pituitary and 3 mm Kerrison. The diskectomy was carried out to the lateral border of the uncovertebral joints bilaterally. A 4 mm bur was then used to trim the anterior osteophytes as well as flatten the vertebral endplates in preparation for arthrodesis. The anterior aspect of the uncovertebral joints were also resected using the bur. The posterior portions of the uncovertebral joints were taken down with a 2 mm Kerrison. An interval was developed in the posterior longitudinal ligament and annulus fibrosus with a micro nerve hook.   A 2 mm Kerrison was then used to resect these structures out to the lateral border of the uncovertebral joints bilaterally. A ball tipped nerve hook was used to palpate laterally and confirm no residual nerve root or spinal cord impingement. This was felt to be satisfactory bilaterally. The interbody sizing system was brought to the field and a size 5  lordotic interbody cage device fit very nicely. The appropriate size cage was selected and filled with allograft and impacted with a tamp and mallet after the wound was liberally irrigated. The anterior cervical plating system was brought to the field and an appropriate size plate was selected and applied across  C3 - C5. The peripheral screw holes were drilled and filled appropriate length screws. The locking mechanism was tightened. C-arm fluoroscopy was brought in and used to obtain AP and lateral images, both of which were felt to be satisfactory for appropriate spinal level, graft and hardware placement. The wound was liberally irrigated. The incision and the incision was closed in a layered fashion. Dermabond was applied. Sterile dressings were applied. The patient tolerated the procedure well and was returned to the post anesthesia care unit in stable condition.      Jovan Higuera MD

## 2021-08-25 NOTE — ANESTHESIA PREPROCEDURE EVALUATION
Anesthetic History   No history of anesthetic complications            Review of Systems / Medical History  Patient summary reviewed, nursing notes reviewed and pertinent labs reviewed    Pulmonary  Within defined limits                 Neuro/Psych   Within defined limits           Cardiovascular                  Exercise tolerance: >4 METS     GI/Hepatic/Renal     GERD: well controlled           Endo/Other             Other Findings              Physical Exam    Airway  Mallampati: II  TM Distance: 4 - 6 cm  Neck ROM: normal range of motion   Mouth opening: Normal     Cardiovascular  Regular rate and rhythm,  S1 and S2 normal,  no murmur, click, rub, or gallop             Dental  No notable dental hx       Pulmonary  Breath sounds clear to auscultation               Abdominal         Other Findings        Anesthetic History   No history of anesthetic complications           Review of Systems / Medical History  Patient summary reviewed and pertinent labs reviewed    Pulmonary  Within defined limits               Neuro/Psych              Cardiovascular                Exercise tolerance: >4 METS     GI/Hepatic/Renal     GERD: well controlled             Endo/Other        Arthritis     Other Findings            Physical Exam    Airway  Mallampati: I  TM Distance: > 6 cm  Neck ROM: normal range of motion   Mouth opening: Normal     Cardiovascular  Regular rate and rhythm,  S1 and S2 normal,  no murmur, click, rub, or gallop  Rhythm: regular  Rate: normal         Dental  No notable dental hx       Pulmonary  Breath sounds clear to auscultation               Abdominal        Comments: DEBI  HEENT grossly nl  Chest clear  Heart RSR s murmer  Abd soft normal bowel sounds  Remainder deferred Other Findings            Anesthetic Plan    ASA: 2  Anesthesia type: MAC            Anesthetic plan and risks discussed with: Patient                Anesthetic Plan    ASA: 2  Anesthesia type: general          Induction: Intravenous  Anesthetic plan and risks discussed with: Patient and Spouse

## 2021-08-25 NOTE — DISCHARGE INSTRUCTIONS
Cervical (Neck) Fusion Postoperative Home Instructions    - SHOWERING: You may shower the first day you are home with the dressing on. Do not soak in a tub for at least three weeks. If your dressing gets wet, change it according to the written instructions below. You may remove the dressing in 3 days. Use only soap and water on the incision and pat it dry. Do not scrub the incision.    - WOUND CARE: A small to moderate amount of reddish drainage on the dressing is normal the first 1-2 days after surgery. If your dressing becomes soaked with drainage, let your doctor know. [facility] HANDS BEFORE AND AFTER INCISION CARE.    - SIGNS OF INFECTION: Please notify your physician for any of the following: a temperature greater than 100.5, extreme tenderness at the wound, excessive redness and/or swelling, or large amounts of drainage from the wound. If you think you have a wound infection, call your physician's office immediately.    - SWALLOWING: Don't be alarmed if it seems there is a lump in your throat for several days after surgery- this is normal. Eat only soft foods and drink plenty of fluids until your throat feels better. If you begin having trouble swallowing, call the office immediately. -EATING: Alpine foods today, nothing spicy or greasy.    - DRIVING: You may not begin driving until after your visit to the physician's office for a wound and suture check. This is normally 7-10 days after you come home from the hospital.    - MEDICATIONS: You may not take anti-inflammatory medications. These include over-the-counter ibuprofen products such as Motrin, Advil, Aleve and other related medications or prescription medications such as Ultram, Naproxen, Indocin, or Celebrex and others. These medications slow down the bone healing. You may take Tylenol unless your prescribed medication has Tylenol in it. The pain medicine prescribed may be taken as needed.  You should continue taking a stool softener twice a day, drink plenty of water and eat high fiber foods to avoid constipation. This is a common problem patients experience while taking pain medicine. MEDICATION INTERACTION:  During your procedure you potentially received a medication or medications which may reduce the effectiveness of oral contraceptives. Please consider other forms of contraception for 1 month following your procedure if you are currently using oral contraceptives as your primary form of birth control. In addition to this, we recommend continuing your oral contraceptive as prescribed, unless otherwise instructed by your physician, during this time    - DEEP BREATHING EXERCISES: Continue to use your incentive spirometer for deep breathing exercises. - ACTIVITY: Avoid reaching overhead, particularly to lift things, until you have been told that your fusion has healed. Do not lift objects heavier than a coffee cup or a newspaper. You shouldn't lift anything heavier than 2-5 pounds. When lifting, you should bend with your knees and keep your back straight. Sleeping may be difficult and sometimes requires you to change positions frequently; try to sleep with your head elevated 15-30 degrees. You may turn your head gradually from side to side, but avoid placing your chin to your chest or flexing your head backwards. You may remove your AMELIA hose when consistently walking. You may do steps and inclines in moderation.    - SEXUAL RELATIONS: You may resume sexual relations 2 weeks after your surgery. - WALKING PROGRAM: After your sutures are removed or dissolved, it is very important that you begin a progressive walking program. Walking strengthens the spinal muscles and will help protect your disc and vertebrae. You will need to increase your walking program up to two miles per day over the next four weeks. You should begin slowly with 1/8 to 1/4 of a mile and add to this each day.  Please be very consistent with your walking program, as this is a vital part of your recovery.    - SYMPTOMS AFTER SURGERY: Don't be alarmed if you still have some of the same symptoms you had prior to surgery. The nerves often require time to heal after the pressure has been relieved. You may also experience pain between your shoulder blades which is common after this surgery. The level of pain you experience should improve as your body heals. Please call our office if you have any other questions or problems. Listen to your body; it will tell you if you are overdoing it. Use common sense and take care of yourself! After general anesthesia or intravenous sedation, for 24 hours or while taking prescription Narcotics:  · Limit your activities  · A responsible adult needs to be with you for the next 24 hours  · Do not drive and operate hazardous machinery  · Do not make important personal or business decisions  · Do not drink alcoholic beverages  · If you have not urinated within 8 hours after discharge, and you are experiencing discomfort from urinary retention, please go to the nearest ED. · If you have sleep apnea and have a CPAP machine, please use it for all naps and sleeping. · Please use caution when taking narcotics and any of your home medications that may cause drowsiness. *  Please give a list of your current medications to your Primary Care Provider. *  Please update this list whenever your medications are discontinued, doses are      changed, or new medications (including over-the-counter products) are added. *  Please carry medication information at all times in case of emergency situations. These are general instructions for a healthy lifestyle:  No smoking/ No tobacco products/ Avoid exposure to second hand smoke  Surgeon General's Warning:  Quitting smoking now greatly reduces serious risk to your health.   Obesity, smoking, and sedentary lifestyle greatly increases your risk for illness  A healthy diet, regular physical exercise & weight monitoring are important for maintaining a healthy lifestyle    You may be retaining fluid if you have a history of heart failure or if you experience any of the following symptoms:  Weight gain of 3 pounds or more overnight or 5 pounds in a week, increased swelling in our hands or feet or shortness of breath while lying flat in bed. Please call your doctor as soon as you notice any of these symptoms; do not wait until your next office visit. Jairon Lange  or  Angelito Alves.   Phone: [phone]  Fax: [phone]  [email or web address]

## 2021-08-25 NOTE — ANESTHESIA POSTPROCEDURE EVALUATION
Procedure(s):  CERVICAL 3-CERVICAL 4 ANTERIOR CERVICAL DISCECTOMY AND FUSION WITH INTERBODY SPACER, ALLOGRAFT AND INSTRUMENTATION. general    Anesthesia Post Evaluation      Multimodal analgesia: multimodal analgesia used between 6 hours prior to anesthesia start to PACU discharge  Patient location during evaluation: PACU  Patient participation: complete - patient participated  Level of consciousness: awake and awake and alert  Pain management: adequate  Airway patency: patent  Anesthetic complications: no  Cardiovascular status: acceptable  Respiratory status: acceptable  Hydration status: acceptable  Post anesthesia nausea and vomiting:  controlled      INITIAL Post-op Vital signs:   Vitals Value Taken Time   /77 08/25/21 0929   Temp 36.7 °C (98 °F) 08/25/21 0859   Pulse 74 08/25/21 0932   Resp 14 08/25/21 0919   SpO2 100 % 08/25/21 0932   Vitals shown include unvalidated device data.

## 2021-08-25 NOTE — H&P
Name: Iris Hale  YOB: 1945  Gender: female  MRN: [id number]  Age: 67 y.o.     Chief Complaint: Neck and radiating upper extremity pain.     History of present illness:     This is a very pleasant 67 y.o. female who has been followed for cervical spine pathology including a greater than 2-year history of neck pain and radiation primarily to the left trapezius. She also has known left shoulder rotator cuff arthropathy but continues to have excellent range of motion in the shoulder and is relatively pain-free with that motion. It is felt that her current symptoms are more nerve mediated than arthritis or weakness. The onset of the symptoms was rather insidious. The quality of the pain is described as a deep ache in the neck and shoulder area with intermittent sharp and shooting sensations. A tingling sensation is over the lateral neck. There is also some associated pain in the periscapular area. The symptoms seem to be aggravated by overhead activities. She has not responded to a regimen of NSAIDs or oral steroids.       Physical Exam:      Oriented to person, place and time.     Chest is clear to ascultation.     Heart is regular rate and rhythm.     Spurling's sign is positive to the left side for reproduction of radicular symptoms.       There is subjective light touch sensory loss over the left side of the neck      Strength testing in the upper extremity reveals the following based on the 5 point grading scale:          Delt(C5) Bicep(C6) WE(C6) Tricep (C7) WF(C7) (C8) Int (T1)   Right 5 5 5 5 5 5 5   Left 4 5 5 5 5 5 5      Radiographic Studies:     MRI Cervical spine, report and images reviewed and interpreted and reveals advanced multilevel spondylosis including severe degenerative disc with loss of disc height from C3-C7. There are large anterior osteophytes. There is some central stenosis at C5-C7 but no myelomalacia.   There is central stenosis and severe left-sided foraminal stenosis at C3-C4.      Diagnosis:         ICD-10-CM ICD-9-CM     1. Spinal stenosis in cervical region  M48.02 723.0 XR SPINE CERV PA LAT ODONT 3 V MAX   2. Cervical spinal stenosis  M48.02 723. 0           Assessment/Plan:       This patient's clinical history and physical exam is consistent with left C4 radiculopathy. The imaging studies are concordant with the patient's symptoms. Conservative efforts have been reasonably exhausted and the patient feels like they cannot go on with the symptoms as they are. We have previously discussed surgical options and now they would like to proceed with surgery.     We discussed the details of the surgery including an incision over the left side of the front of the neck. The windpipe and foodpipe would be retracted to the side to expose the underlying spine. The appropriate disc would be identified with an X-ray and the disc would be removed. The nerves would be freed by trimming any impinging structures such as bone spurs and disc material.   The disc space would then be filled with an interbody spacer and bone graft from a cadaver. A drain may be placed, and then the wound would be closed with suture and covered with sterile dressings. She would expect to either be discharged same day or stay in the hospital until overnight depending on how quickly she recovers. Follow-up would be scheduled for 2-3 weeks and she would have restrictions including no driving for 2 weeks, no lifting greater than 15 lbs.   We also discussed the potential risks of the surgery including, but not limited to infection, spinal fluid leak, compressive hematoma; injury to spinal cord or peripheral nerve root resulting in paralysis, or loss of use of an extremity; persistent neck or arm symptoms or pain at the bone graft site; failure of the bone graft to heal or failure of the hardware resulting in the possibility of needing additional surgery; postoperative hoarseness or dysphagia; injury to an artery or vein resulting in significant blood loss; and the risks of anesthesia including, but not limited heart attack, stroke, blood clot or death. The patient voiced an understanding of these issues outlined.   The procedure that I think may be beneficial here is an anterior cervical discectomy and fusion with interbody spacer, allograft and instrumentation from C3-C4.             Electronically Signed By Moncho Guerrero MD

## 2021-08-26 NOTE — PROGRESS NOTES
Late Entry    Spiritual Care visit. Initial Visit, Pre Surgery Consult. Visit and prayer before patient goes to surgery.     Visit by Markus Wilkinson M.Ed., Th.B. ,Staff

## 2022-02-09 ENCOUNTER — HOSPITAL ENCOUNTER (OUTPATIENT)
Dept: SURGERY | Age: 77
Discharge: HOME | End: 2022-02-09
Attending: ORTHOPAEDIC SURGERY
Payer: MEDICARE

## 2022-02-09 VITALS
HEIGHT: 63 IN | SYSTOLIC BLOOD PRESSURE: 101 MMHG | RESPIRATION RATE: 16 BRPM | OXYGEN SATURATION: 100 % | TEMPERATURE: 98 F | DIASTOLIC BLOOD PRESSURE: 60 MMHG | WEIGHT: 130.6 LBS | HEART RATE: 77 BPM | BODY MASS INDEX: 23.14 KG/M2

## 2022-02-09 LAB
ANION GAP SERPL CALC-SCNC: 5 MMOL/L (ref 7–16)
BACTERIA SPEC CULT: NORMAL
BUN SERPL-MCNC: 19 MG/DL (ref 8–23)
CALCIUM SERPL-MCNC: 9.1 MG/DL (ref 8.3–10.4)
CHLORIDE SERPL-SCNC: 108 MMOL/L (ref 98–107)
CO2 SERPL-SCNC: 29 MMOL/L (ref 21–32)
CREAT SERPL-MCNC: 0.75 MG/DL (ref 0.6–1)
ERYTHROCYTE [DISTWIDTH] IN BLOOD BY AUTOMATED COUNT: 13.9 % (ref 11.9–14.6)
GLUCOSE SERPL-MCNC: 121 MG/DL (ref 65–100)
HCT VFR BLD AUTO: 36.3 % (ref 35.8–46.3)
HGB BLD-MCNC: 11.7 G/DL (ref 11.7–15.4)
MCH RBC QN AUTO: 29.9 PG (ref 26.1–32.9)
MCHC RBC AUTO-ENTMCNC: 32.2 G/DL (ref 31.4–35)
MCV RBC AUTO: 92.8 FL (ref 79.6–97.8)
NRBC # BLD: 0 K/UL (ref 0–0.2)
PLATELET # BLD AUTO: 288 K/UL (ref 150–450)
PMV BLD AUTO: 9.6 FL (ref 9.4–12.3)
POTASSIUM SERPL-SCNC: 4.4 MMOL/L (ref 3.5–5.1)
RBC # BLD AUTO: 3.91 M/UL (ref 4.05–5.2)
SERVICE CMNT-IMP: NORMAL
SODIUM SERPL-SCNC: 142 MMOL/L (ref 136–145)
WBC # BLD AUTO: 6.3 K/UL (ref 4.3–11.1)

## 2022-02-09 PROCEDURE — 77030027138 HC INCENT SPIROMETER -A

## 2022-02-09 PROCEDURE — 87641 MR-STAPH DNA AMP PROBE: CPT

## 2022-02-09 PROCEDURE — 80048 BASIC METABOLIC PNL TOTAL CA: CPT

## 2022-02-09 PROCEDURE — 93005 ELECTROCARDIOGRAM TRACING: CPT | Performed by: ORTHOPAEDIC SURGERY

## 2022-02-09 PROCEDURE — 85027 COMPLETE CBC AUTOMATED: CPT

## 2022-02-09 RX ORDER — BISMUTH SUBSALICYLATE 262 MG
1 TABLET,CHEWABLE ORAL DAILY
COMMUNITY

## 2022-02-09 NOTE — PERIOP NOTES
Patient verified name and     Order for consent NOT found in EHR and UNABLE to match case posting; patient verified. Type 3 surgery, walk-in assessment complete. Labs per surgeon: MRSA/MSSA; results pending  Labs per anesthesia protocol: CBC, BMP, T/S s/h dos; results pending  [id number]    Patient COVID test date 02/10/2022; The testing center is located at the . [address] , [place]. If appointment is needed patient provided telephone number of [phone]. Hospital approved surgical skin cleanser and instructions given per hospital policy. Patient provided with and instructed on educational handouts including Guide to Surgery, Pain Management, Hand Hygiene, Blood Transfusion Education, and [facility] Brochure. Patient answered medical/surgical history questions at their best of ability. All prior to admission medications documented in [facility]. Original medication prescription bottle WERE visualized during patient appointment. Patient instructed to hold all vitamins 7 days prior to surgery and NSAIDS 5 days prior to surgery, patient verbalized understanding. Patient teach back successful and patient demonstrates knowledge of instructions. How to Use Your Incentive Spirometer       About Your Incentive Spirometer  An incentive spirometer is a device that will expand your lungs by helping you to breathe more deeply and fully. The parts of your incentive spirometer are labeled in Figure 1. Using your incentive spirometer  When youre using your incentive spirometer, make sure to breathe through your mouth. If you breathe through your nose, the incentive spirometer wont work properly. You can hold your nose if you have trouble. DO NOT BLOW INTO THE DEVICE. If you feel dizzy at any time, stop and rest. Try again at a later time. 1. Sit upright in a chair or in bed. Hold the incentive spirometer at eye level.    2. Put the mouthpiece in your mouth and close your lips tightly around it. Slowly breathe out (exhale) completely. 3. Breathe in (inhale) slowly through your mouth as deeply as you can. As you take the breath, you will see the piston rise inside the large column. While the piston rises, the indicator on the right should move upwards. It should stay in between the 2 arrows (see Figure 1). 4. Try to get the piston as high as you can, while keeping the indicator between the arrows. If the indicator doesnt stay between the arrows, youre breathing either too fast or too slow. 5. When you get it as high as you can, hold your breath for 10 seconds, or as long as possible. While youre holding your breath, the piston will slowly fall to the base of the spirometer. 6. Once the piston reaches the bottom of the spirometer, breathe out slowly through your mouth. Rest for a few seconds. 7. Repeat 10 times. Try to get the piston to the same level with each breath. 8. After each set of 10 breaths, try to cough as coughing will help loosen or clear any mucus in your lungs. 9. Put the marker at the level the piston reached on your incentive spirometer. This will be your goal next time. Repeat these steps every hour that youre awake. Cover the mouthpiece of the incentive spirometer when you arent using it      PLEASE CONTINUE TAKING ALL PRESCRIPTION MEDICATIONS UP TO THE DAY OF SURGERY UNLESS OTHERWISE DIRECTED BELOW. DISCONTINUE all vitamins and supplements 7 days prior to surgery. DISCONTINUE Non-Steriodal Anti-Inflammatory (NSAIDS) such as Advil and Aleve 5 days prior to surgery. Home Medications to take  the day of surgery      Oxybutynin      Pantoprazole      Paroxetine      Home Medications   to Hold     Meloxicam         Comments    Covid test 02/10/2022 @ [address], [place]    On the day before surgery please take Acetaminophen 1000mg in the morning and then again before bed.  You may substitute for Tylenol 650 mg. Hibiclens shower the night before surgery and the morning of surgery. Bring incentive spirometer the day of surgery. Please do not bring home medications with you on the day of surgery unless otherwise directed by your nurse. If you are instructed to bring home medications, please give them to your nurse as they will be administered by the nursing staff. If you have any questions, please call [facility] [phone] or [facility] [phone]. A copy of this note was provided to the patient for reference.

## 2022-02-10 LAB
ATRIAL RATE: 67 BPM
CALCULATED P AXIS, ECG09: 71 DEGREES
CALCULATED R AXIS, ECG10: 62 DEGREES
CALCULATED T AXIS, ECG11: 57 DEGREES
DIAGNOSIS, 93000: NORMAL
P-R INTERVAL, ECG05: 154 MS
Q-T INTERVAL, ECG07: 402 MS
QRS DURATION, ECG06: 82 MS
QTC CALCULATION (BEZET), ECG08: 424 MS
VENTRICULAR RATE, ECG03: 67 BPM

## 2022-02-13 ENCOUNTER — ANESTHESIA EVENT (OUTPATIENT)
Dept: SURGERY | Age: 77
DRG: 483 | End: 2022-02-13
Payer: MEDICARE

## 2022-02-13 PROBLEM — M19.012 OSTEOARTHRITIS OF LEFT SHOULDER: Chronic | Status: ACTIVE | Noted: 2022-02-13

## 2022-02-14 ENCOUNTER — ANESTHESIA (OUTPATIENT)
Dept: SURGERY | Age: 77
DRG: 483 | End: 2022-02-14
Payer: MEDICARE

## 2022-02-14 ENCOUNTER — APPOINTMENT (OUTPATIENT)
Dept: GENERAL RADIOLOGY | Age: 77
DRG: 483 | End: 2022-02-14
Attending: INTERNAL MEDICINE
Payer: MEDICARE

## 2022-02-14 ENCOUNTER — APPOINTMENT (OUTPATIENT)
Dept: GENERAL RADIOLOGY | Age: 77
DRG: 483 | End: 2022-02-14
Attending: ORTHOPAEDIC SURGERY
Payer: MEDICARE

## 2022-02-14 ENCOUNTER — HOSPITAL ENCOUNTER (INPATIENT)
Age: 77
LOS: 1 days | Discharge: HOME HEALTH | DRG: 483 | End: 2022-02-15
Attending: ORTHOPAEDIC SURGERY | Admitting: ORTHOPAEDIC SURGERY
Payer: MEDICARE

## 2022-02-14 DIAGNOSIS — M19.012 LOCALIZED OSTEOARTHRITIS OF LEFT SHOULDER: ICD-10-CM

## 2022-02-14 DIAGNOSIS — J93.9 PNEUMOTHORAX ON LEFT: ICD-10-CM

## 2022-02-14 DIAGNOSIS — J93.9 PNEUMOTHORAX, UNSPECIFIED TYPE: ICD-10-CM

## 2022-02-14 DIAGNOSIS — M19.012 PRIMARY OSTEOARTHRITIS OF LEFT SHOULDER: Primary | Chronic | ICD-10-CM

## 2022-02-14 PROBLEM — M48.02 CERVICAL SPINAL STENOSIS: Chronic | Status: ACTIVE | Noted: 2021-08-25

## 2022-02-14 LAB
ABO + RH BLD: NORMAL
BLOOD GROUP ANTIBODIES SERPL: NORMAL
SPECIMEN EXP DATE BLD: NORMAL

## 2022-02-14 PROCEDURE — C1776 JOINT DEVICE (IMPLANTABLE): HCPCS | Performed by: ORTHOPAEDIC SURGERY

## 2022-02-14 PROCEDURE — 77030003602 HC NDL NRV BLK BBMI -B: Performed by: STUDENT IN AN ORGANIZED HEALTH CARE EDUCATION/TRAINING PROGRAM

## 2022-02-14 PROCEDURE — 77030013727 HC IRR FAN PULSVC ZIMM -B: Performed by: ORTHOPAEDIC SURGERY

## 2022-02-14 PROCEDURE — 0W9B30Z DRAINAGE OF LEFT PLEURAL CAVITY WITH DRAINAGE DEVICE, PERCUTANEOUS APPROACH: ICD-10-PCS | Performed by: INTERNAL MEDICINE

## 2022-02-14 PROCEDURE — 74011250637 HC RX REV CODE- 250/637: Performed by: ORTHOPAEDIC SURGERY

## 2022-02-14 PROCEDURE — 74011250636 HC RX REV CODE- 250/636: Performed by: ORTHOPAEDIC SURGERY

## 2022-02-14 PROCEDURE — C1713 ANCHOR/SCREW BN/BN,TIS/BN: HCPCS | Performed by: ORTHOPAEDIC SURGERY

## 2022-02-14 PROCEDURE — 77030008462 HC STPLR SKN PROX J&J -A: Performed by: ORTHOPAEDIC SURGERY

## 2022-02-14 PROCEDURE — 86900 BLOOD TYPING SEROLOGIC ABO: CPT

## 2022-02-14 PROCEDURE — 2709999900 HC NON-CHARGEABLE SUPPLY: Performed by: INTERNAL MEDICINE

## 2022-02-14 PROCEDURE — 76942 ECHO GUIDE FOR BIOPSY: CPT | Performed by: ORTHOPAEDIC SURGERY

## 2022-02-14 PROCEDURE — 2709999900 HC NON-CHARGEABLE SUPPLY: Performed by: ORTHOPAEDIC SURGERY

## 2022-02-14 PROCEDURE — 71045 X-RAY EXAM CHEST 1 VIEW: CPT

## 2022-02-14 PROCEDURE — 77030020471 HC BIT DRL CREV ZIMM -C: Performed by: ORTHOPAEDIC SURGERY

## 2022-02-14 PROCEDURE — 74011250636 HC RX REV CODE- 250/636: Performed by: STUDENT IN AN ORGANIZED HEALTH CARE EDUCATION/TRAINING PROGRAM

## 2022-02-14 PROCEDURE — 77030038269 HC DRN EXT URIN PURWCK BARD -A

## 2022-02-14 PROCEDURE — 76210000006 HC OR PH I REC 0.5 TO 1 HR: Performed by: ORTHOPAEDIC SURGERY

## 2022-02-14 PROCEDURE — 73030 X-RAY EXAM OF SHOULDER: CPT

## 2022-02-14 PROCEDURE — 77030002996 HC SUT SLK J&J -A: Performed by: ORTHOPAEDIC SURGERY

## 2022-02-14 PROCEDURE — 76010000172 HC OR TIME 2.5 TO 3 HR INTENSV-TIER 1: Performed by: ORTHOPAEDIC SURGERY

## 2022-02-14 PROCEDURE — 77030037088 HC TUBE ENDOTRACH ORAL NSL COVD-A: Performed by: STUDENT IN AN ORGANIZED HEALTH CARE EDUCATION/TRAINING PROGRAM

## 2022-02-14 PROCEDURE — 77030031139 HC SUT VCRL2 J&J -A: Performed by: ORTHOPAEDIC SURGERY

## 2022-02-14 PROCEDURE — 74011000250 HC RX REV CODE- 250: Performed by: ORTHOPAEDIC SURGERY

## 2022-02-14 PROCEDURE — 77030040361 HC SLV COMPR DVT MDII -B: Performed by: ORTHOPAEDIC SURGERY

## 2022-02-14 PROCEDURE — 74011000250 HC RX REV CODE- 250: Performed by: NURSE ANESTHETIST, CERTIFIED REGISTERED

## 2022-02-14 PROCEDURE — C1729 CATH, DRAINAGE: HCPCS | Performed by: INTERNAL MEDICINE

## 2022-02-14 PROCEDURE — 77030019908 HC STETH ESOPH SIMS -A: Performed by: STUDENT IN AN ORGANIZED HEALTH CARE EDUCATION/TRAINING PROGRAM

## 2022-02-14 PROCEDURE — 77030039425 HC BLD LARYNG TRULITE DISP TELE -A: Performed by: STUDENT IN AN ORGANIZED HEALTH CARE EDUCATION/TRAINING PROGRAM

## 2022-02-14 PROCEDURE — 74011250636 HC RX REV CODE- 250/636: Performed by: NURSE ANESTHETIST, CERTIFIED REGISTERED

## 2022-02-14 PROCEDURE — 76010010054 HC POST OP PAIN BLOCK: Performed by: ORTHOPAEDIC SURGERY

## 2022-02-14 PROCEDURE — 77030018673: Performed by: ORTHOPAEDIC SURGERY

## 2022-02-14 PROCEDURE — 99223 1ST HOSP IP/OBS HIGH 75: CPT | Performed by: INTERNAL MEDICINE

## 2022-02-14 PROCEDURE — 74011250636 HC RX REV CODE- 250/636: Performed by: INTERNAL MEDICINE

## 2022-02-14 PROCEDURE — 77030002922 HC SUT FBRWRE ARTH -B: Performed by: ORTHOPAEDIC SURGERY

## 2022-02-14 PROCEDURE — 2709999900 HC NON-CHARGEABLE SUPPLY

## 2022-02-14 PROCEDURE — 0RRK00Z REPLACEMENT OF LEFT SHOULDER JOINT WITH REVERSE BALL AND SOCKET SYNTHETIC SUBSTITUTE, OPEN APPROACH: ICD-10-PCS | Performed by: ORTHOPAEDIC SURGERY

## 2022-02-14 PROCEDURE — 77030040922 HC BLNKT HYPOTHRM STRY -A: Performed by: STUDENT IN AN ORGANIZED HEALTH CARE EDUCATION/TRAINING PROGRAM

## 2022-02-14 PROCEDURE — 76040000019: Performed by: INTERNAL MEDICINE

## 2022-02-14 PROCEDURE — 76060000037 HC ANESTHESIA 3 TO 3.5 HR: Performed by: ORTHOPAEDIC SURGERY

## 2022-02-14 PROCEDURE — 77030012390 HC DRN CHST BTL GTNG -B: Performed by: INTERNAL MEDICINE

## 2022-02-14 PROCEDURE — 65270000029 HC RM PRIVATE

## 2022-02-14 PROCEDURE — 32551 INSERTION OF CHEST TUBE: CPT | Performed by: INTERNAL MEDICINE

## 2022-02-14 PROCEDURE — 77030006835 HC BLD SAW SAG STRY -B: Performed by: ORTHOPAEDIC SURGERY

## 2022-02-14 DEVICE — STEM HUM PRI MINI 13MM -- COMPREHENSIVE: Type: IMPLANTABLE DEVICE | Site: SHOULDER | Status: FUNCTIONAL

## 2022-02-14 DEVICE — SCR CTRL RVS 6.5X25MM STRL/RST -- COMPREHENSIVE: Type: IMPLANTABLE DEVICE | Site: SHOULDER | Status: FUNCTIONAL

## 2022-02-14 DEVICE — SCR LCK 3.5 HEX 4.75X30 STRL -- COMPREHENSIVE: Type: IMPLANTABLE DEVICE | Site: SHOULDER | Status: FUNCTIONAL

## 2022-02-14 DEVICE — BASEPLT W/ADAPTER GLEN 25MM -- COMPREHENSIVE: Type: IMPLANTABLE DEVICE | Site: SHOULDER | Status: FUNCTIONAL

## 2022-02-14 DEVICE — GLENOSPHERE RVS STD 0D 36MM -- COMPREHENSIVE: Type: IMPLANTABLE DEVICE | Site: SHOULDER | Status: FUNCTIONAL

## 2022-02-14 DEVICE — IMPLANTABLE DEVICE: Type: IMPLANTABLE DEVICE | Site: SHOULDER | Status: FUNCTIONAL

## 2022-02-14 RX ORDER — OXYCODONE HYDROCHLORIDE 5 MG/1
5 TABLET ORAL
Status: DISCONTINUED | OUTPATIENT
Start: 2022-02-14 | End: 2022-02-14 | Stop reason: HOSPADM

## 2022-02-14 RX ORDER — LIDOCAINE HYDROCHLORIDE 10 MG/ML
0.1 INJECTION INFILTRATION; PERINEURAL AS NEEDED
Status: DISCONTINUED | OUTPATIENT
Start: 2022-02-14 | End: 2022-02-14 | Stop reason: HOSPADM

## 2022-02-14 RX ORDER — PROPOFOL 10 MG/ML
INJECTION, EMULSION INTRAVENOUS AS NEEDED
Status: DISCONTINUED | OUTPATIENT
Start: 2022-02-14 | End: 2022-02-14 | Stop reason: HOSPADM

## 2022-02-14 RX ORDER — KETOROLAC TROMETHAMINE 15 MG/ML
15 INJECTION, SOLUTION INTRAMUSCULAR; INTRAVENOUS
Status: DISCONTINUED | OUTPATIENT
Start: 2022-02-14 | End: 2022-02-15 | Stop reason: HOSPADM

## 2022-02-14 RX ORDER — SODIUM CHLORIDE 9 MG/ML
75 INJECTION, SOLUTION INTRAVENOUS CONTINUOUS
Status: DISCONTINUED | OUTPATIENT
Start: 2022-02-14 | End: 2022-02-15 | Stop reason: HOSPADM

## 2022-02-14 RX ORDER — LORAZEPAM 1 MG/1
1 TABLET ORAL 2 TIMES DAILY
Status: DISCONTINUED | OUTPATIENT
Start: 2022-02-14 | End: 2022-02-15 | Stop reason: HOSPADM

## 2022-02-14 RX ORDER — DEXAMETHASONE SODIUM PHOSPHATE 4 MG/ML
INJECTION, SOLUTION INTRA-ARTICULAR; INTRALESIONAL; INTRAMUSCULAR; INTRAVENOUS; SOFT TISSUE AS NEEDED
Status: DISCONTINUED | OUTPATIENT
Start: 2022-02-14 | End: 2022-02-14 | Stop reason: HOSPADM

## 2022-02-14 RX ORDER — CEFAZOLIN SODIUM/WATER 2 G/20 ML
2 SYRINGE (ML) INTRAVENOUS ONCE
Status: COMPLETED | OUTPATIENT
Start: 2022-02-14 | End: 2022-02-14

## 2022-02-14 RX ORDER — OXYBUTYNIN CHLORIDE 5 MG/1
5 TABLET, EXTENDED RELEASE ORAL DAILY
Status: DISCONTINUED | OUTPATIENT
Start: 2022-02-15 | End: 2022-02-15 | Stop reason: HOSPADM

## 2022-02-14 RX ORDER — SODIUM CHLORIDE 0.9 % (FLUSH) 0.9 %
5-40 SYRINGE (ML) INJECTION AS NEEDED
Status: DISCONTINUED | OUTPATIENT
Start: 2022-02-14 | End: 2022-02-14 | Stop reason: HOSPADM

## 2022-02-14 RX ORDER — DEXAMETHASONE SODIUM PHOSPHATE 4 MG/ML
INJECTION, SOLUTION INTRA-ARTICULAR; INTRALESIONAL; INTRAMUSCULAR; INTRAVENOUS; SOFT TISSUE
Status: DISCONTINUED | OUTPATIENT
Start: 2022-02-14 | End: 2022-02-14 | Stop reason: HOSPADM

## 2022-02-14 RX ORDER — ACETAMINOPHEN 325 MG/1
975 TABLET ORAL ONCE
Status: COMPLETED | OUTPATIENT
Start: 2022-02-14 | End: 2022-02-14

## 2022-02-14 RX ORDER — FENTANYL CITRATE 50 UG/ML
INJECTION, SOLUTION INTRAMUSCULAR; INTRAVENOUS AS NEEDED
Status: DISCONTINUED | OUTPATIENT
Start: 2022-02-14 | End: 2022-02-14 | Stop reason: HOSPADM

## 2022-02-14 RX ORDER — HYDROCODONE BITARTRATE AND ACETAMINOPHEN 10; 325 MG/1; MG/1
1 TABLET ORAL
Status: DISCONTINUED | OUTPATIENT
Start: 2022-02-14 | End: 2022-02-15 | Stop reason: HOSPADM

## 2022-02-14 RX ORDER — GLYCOPYRROLATE 0.2 MG/ML
INJECTION INTRAMUSCULAR; INTRAVENOUS AS NEEDED
Status: DISCONTINUED | OUTPATIENT
Start: 2022-02-14 | End: 2022-02-14 | Stop reason: HOSPADM

## 2022-02-14 RX ORDER — BUPIVACAINE HYDROCHLORIDE AND EPINEPHRINE 2.5; 5 MG/ML; UG/ML
INJECTION, SOLUTION EPIDURAL; INFILTRATION; INTRACAUDAL; PERINEURAL AS NEEDED
Status: DISCONTINUED | OUTPATIENT
Start: 2022-02-14 | End: 2022-02-14 | Stop reason: HOSPADM

## 2022-02-14 RX ORDER — ESMOLOL HYDROCHLORIDE 10 MG/ML
INJECTION INTRAVENOUS AS NEEDED
Status: DISCONTINUED | OUTPATIENT
Start: 2022-02-14 | End: 2022-02-14 | Stop reason: HOSPADM

## 2022-02-14 RX ORDER — FENTANYL CITRATE 50 UG/ML
100 INJECTION, SOLUTION INTRAMUSCULAR; INTRAVENOUS ONCE
Status: COMPLETED | OUTPATIENT
Start: 2022-02-14 | End: 2022-02-14

## 2022-02-14 RX ORDER — NEOSTIGMINE METHYLSULFATE 1 MG/ML
INJECTION, SOLUTION INTRAVENOUS AS NEEDED
Status: DISCONTINUED | OUTPATIENT
Start: 2022-02-14 | End: 2022-02-14 | Stop reason: HOSPADM

## 2022-02-14 RX ORDER — PANTOPRAZOLE SODIUM 40 MG/1
40 TABLET, DELAYED RELEASE ORAL DAILY
Status: DISCONTINUED | OUTPATIENT
Start: 2022-02-15 | End: 2022-02-15 | Stop reason: HOSPADM

## 2022-02-14 RX ORDER — NALOXONE HYDROCHLORIDE 0.4 MG/ML
0.4 INJECTION, SOLUTION INTRAMUSCULAR; INTRAVENOUS; SUBCUTANEOUS
Status: DISCONTINUED | OUTPATIENT
Start: 2022-02-14 | End: 2022-02-15 | Stop reason: HOSPADM

## 2022-02-14 RX ORDER — ACETAMINOPHEN 650 MG/1
650 SUPPOSITORY RECTAL ONCE
Status: COMPLETED | OUTPATIENT
Start: 2022-02-14 | End: 2022-02-14

## 2022-02-14 RX ORDER — SODIUM CHLORIDE 0.9 % (FLUSH) 0.9 %
5-40 SYRINGE (ML) INJECTION EVERY 8 HOURS
Status: DISCONTINUED | OUTPATIENT
Start: 2022-02-14 | End: 2022-02-14 | Stop reason: HOSPADM

## 2022-02-14 RX ORDER — SODIUM CHLORIDE 0.9 % (FLUSH) 0.9 %
5-40 SYRINGE (ML) INJECTION EVERY 8 HOURS
Status: DISCONTINUED | OUTPATIENT
Start: 2022-02-14 | End: 2022-02-15 | Stop reason: HOSPADM

## 2022-02-14 RX ORDER — MORPHINE SULFATE 4 MG/ML
4 INJECTION INTRAVENOUS ONCE
Status: COMPLETED | OUTPATIENT
Start: 2022-02-14 | End: 2022-02-14

## 2022-02-14 RX ORDER — ONDANSETRON 2 MG/ML
INJECTION INTRAMUSCULAR; INTRAVENOUS AS NEEDED
Status: DISCONTINUED | OUTPATIENT
Start: 2022-02-14 | End: 2022-02-14 | Stop reason: HOSPADM

## 2022-02-14 RX ORDER — HYDROMORPHONE HYDROCHLORIDE 2 MG/ML
0.5 INJECTION, SOLUTION INTRAMUSCULAR; INTRAVENOUS; SUBCUTANEOUS
Status: DISCONTINUED | OUTPATIENT
Start: 2022-02-14 | End: 2022-02-14 | Stop reason: HOSPADM

## 2022-02-14 RX ORDER — FLUMAZENIL 0.1 MG/ML
0.2 INJECTION INTRAVENOUS
Status: DISCONTINUED | OUTPATIENT
Start: 2022-02-14 | End: 2022-02-14 | Stop reason: HOSPADM

## 2022-02-14 RX ORDER — SODIUM CHLORIDE, SODIUM LACTATE, POTASSIUM CHLORIDE, CALCIUM CHLORIDE 600; 310; 30; 20 MG/100ML; MG/100ML; MG/100ML; MG/100ML
100 INJECTION, SOLUTION INTRAVENOUS CONTINUOUS
Status: DISCONTINUED | OUTPATIENT
Start: 2022-02-14 | End: 2022-02-14 | Stop reason: HOSPADM

## 2022-02-14 RX ORDER — LIDOCAINE HYDROCHLORIDE 20 MG/ML
INJECTION, SOLUTION EPIDURAL; INFILTRATION; INTRACAUDAL; PERINEURAL AS NEEDED
Status: DISCONTINUED | OUTPATIENT
Start: 2022-02-14 | End: 2022-02-14 | Stop reason: HOSPADM

## 2022-02-14 RX ORDER — NALOXONE HYDROCHLORIDE 0.4 MG/ML
0.1 INJECTION, SOLUTION INTRAMUSCULAR; INTRAVENOUS; SUBCUTANEOUS AS NEEDED
Status: DISCONTINUED | OUTPATIENT
Start: 2022-02-14 | End: 2022-02-14 | Stop reason: HOSPADM

## 2022-02-14 RX ORDER — EPHEDRINE SULFATE/0.9% NACL/PF 50 MG/5 ML
SYRINGE (ML) INTRAVENOUS AS NEEDED
Status: DISCONTINUED | OUTPATIENT
Start: 2022-02-14 | End: 2022-02-14 | Stop reason: HOSPADM

## 2022-02-14 RX ORDER — ROCURONIUM BROMIDE 10 MG/ML
INJECTION, SOLUTION INTRAVENOUS AS NEEDED
Status: DISCONTINUED | OUTPATIENT
Start: 2022-02-14 | End: 2022-02-14 | Stop reason: HOSPADM

## 2022-02-14 RX ORDER — PAROXETINE HYDROCHLORIDE 20 MG/1
10 TABLET, FILM COATED ORAL DAILY
Status: DISCONTINUED | OUTPATIENT
Start: 2022-02-15 | End: 2022-02-15 | Stop reason: HOSPADM

## 2022-02-14 RX ORDER — MIDAZOLAM HYDROCHLORIDE 1 MG/ML
2 INJECTION, SOLUTION INTRAMUSCULAR; INTRAVENOUS ONCE
Status: COMPLETED | OUTPATIENT
Start: 2022-02-14 | End: 2022-02-14

## 2022-02-14 RX ORDER — ASPIRIN 81 MG/1
81 TABLET ORAL 2 TIMES DAILY
Status: DISCONTINUED | OUTPATIENT
Start: 2022-02-14 | End: 2022-02-15 | Stop reason: HOSPADM

## 2022-02-14 RX ORDER — CEFAZOLIN SODIUM/WATER 2 G/20 ML
2 SYRINGE (ML) INTRAVENOUS EVERY 8 HOURS
Status: COMPLETED | OUTPATIENT
Start: 2022-02-14 | End: 2022-02-15

## 2022-02-14 RX ORDER — HYDROMORPHONE HYDROCHLORIDE 1 MG/ML
1 INJECTION, SOLUTION INTRAMUSCULAR; INTRAVENOUS; SUBCUTANEOUS
Status: DISCONTINUED | OUTPATIENT
Start: 2022-02-14 | End: 2022-02-15 | Stop reason: HOSPADM

## 2022-02-14 RX ORDER — NALOXONE HYDROCHLORIDE 0.4 MG/ML
0.1 INJECTION, SOLUTION INTRAMUSCULAR; INTRAVENOUS; SUBCUTANEOUS
Status: DISCONTINUED | OUTPATIENT
Start: 2022-02-14 | End: 2022-02-14 | Stop reason: HOSPADM

## 2022-02-14 RX ORDER — ROPIVACAINE HYDROCHLORIDE 5 MG/ML
INJECTION, SOLUTION EPIDURAL; INFILTRATION; PERINEURAL
Status: DISCONTINUED | OUTPATIENT
Start: 2022-02-14 | End: 2022-02-14 | Stop reason: HOSPADM

## 2022-02-14 RX ORDER — SODIUM CHLORIDE 0.9 % (FLUSH) 0.9 %
5-40 SYRINGE (ML) INJECTION AS NEEDED
Status: DISCONTINUED | OUTPATIENT
Start: 2022-02-14 | End: 2022-02-15 | Stop reason: HOSPADM

## 2022-02-14 RX ORDER — DIPHENHYDRAMINE HYDROCHLORIDE 50 MG/ML
12.5 INJECTION, SOLUTION INTRAMUSCULAR; INTRAVENOUS
Status: DISCONTINUED | OUTPATIENT
Start: 2022-02-14 | End: 2022-02-14 | Stop reason: HOSPADM

## 2022-02-14 RX ORDER — AMOXICILLIN 250 MG
1 CAPSULE ORAL
Status: DISCONTINUED | OUTPATIENT
Start: 2022-02-14 | End: 2022-02-15 | Stop reason: HOSPADM

## 2022-02-14 RX ADMIN — DEXAMETHASONE SODIUM PHOSPHATE 4 MG: 4 INJECTION, SOLUTION INTRA-ARTICULAR; INTRALESIONAL; INTRAMUSCULAR; INTRAVENOUS; SOFT TISSUE at 07:00

## 2022-02-14 RX ADMIN — PHENYLEPHRINE HYDROCHLORIDE 100 MCG: 10 INJECTION INTRAVENOUS at 07:36

## 2022-02-14 RX ADMIN — Medication 15 MG: at 08:04

## 2022-02-14 RX ADMIN — ACETAMINOPHEN 975 MG: 325 TABLET ORAL at 16:48

## 2022-02-14 RX ADMIN — SODIUM CHLORIDE 75 ML/HR: 900 INJECTION, SOLUTION INTRAVENOUS at 16:17

## 2022-02-14 RX ADMIN — FENTANYL CITRATE 50 MCG: 50 INJECTION, SOLUTION INTRAMUSCULAR; INTRAVENOUS at 06:57

## 2022-02-14 RX ADMIN — ONDANSETRON 4 MG: 2 INJECTION INTRAMUSCULAR; INTRAVENOUS at 08:16

## 2022-02-14 RX ADMIN — PHENYLEPHRINE HYDROCHLORIDE 200 MCG: 10 INJECTION INTRAVENOUS at 07:53

## 2022-02-14 RX ADMIN — KETOROLAC TROMETHAMINE 15 MG: 15 INJECTION, SOLUTION INTRAMUSCULAR; INTRAVENOUS at 13:59

## 2022-02-14 RX ADMIN — ROCURONIUM BROMIDE 15 MG: 10 INJECTION, SOLUTION INTRAVENOUS at 08:53

## 2022-02-14 RX ADMIN — LORAZEPAM 1 MG: 1 TABLET ORAL at 17:39

## 2022-02-14 RX ADMIN — ROCURONIUM BROMIDE 50 MG: 10 INJECTION, SOLUTION INTRAVENOUS at 07:34

## 2022-02-14 RX ADMIN — SODIUM CHLORIDE, PRESERVATIVE FREE 10 ML: 5 INJECTION INTRAVENOUS at 16:17

## 2022-02-14 RX ADMIN — ESMOLOL HYDROCHLORIDE 1800 MCG: 10 INJECTION, SOLUTION INTRAVENOUS at 10:22

## 2022-02-14 RX ADMIN — SODIUM CHLORIDE, SODIUM LACTATE, POTASSIUM CHLORIDE, AND CALCIUM CHLORIDE: 600; 310; 30; 20 INJECTION, SOLUTION INTRAVENOUS at 10:21

## 2022-02-14 RX ADMIN — SODIUM CHLORIDE, PRESERVATIVE FREE 10 ML: 5 INJECTION INTRAVENOUS at 21:38

## 2022-02-14 RX ADMIN — GLYCOPYRROLATE 0.6 MG: 0.2 INJECTION, SOLUTION INTRAMUSCULAR; INTRAVENOUS at 10:15

## 2022-02-14 RX ADMIN — ASPIRIN 81 MG: 81 TABLET ORAL at 17:39

## 2022-02-14 RX ADMIN — Medication 30 MCG/MIN: at 08:49

## 2022-02-14 RX ADMIN — PHENYLEPHRINE HYDROCHLORIDE 200 MCG: 10 INJECTION INTRAVENOUS at 07:58

## 2022-02-14 RX ADMIN — PHENYLEPHRINE HYDROCHLORIDE 200 MCG: 10 INJECTION INTRAVENOUS at 07:47

## 2022-02-14 RX ADMIN — DEXAMETHASONE SODIUM PHOSPHATE 10 MG: 4 INJECTION, SOLUTION INTRAMUSCULAR; INTRAVENOUS at 08:16

## 2022-02-14 RX ADMIN — ROPIVACAINE HYDROCHLORIDE 20 ML: 150 INJECTION, SOLUTION EPIDURAL; INFILTRATION; PERINEURAL at 07:00

## 2022-02-14 RX ADMIN — PHENYLEPHRINE HYDROCHLORIDE 100 MCG: 10 INJECTION INTRAVENOUS at 08:31

## 2022-02-14 RX ADMIN — LIDOCAINE HYDROCHLORIDE 80 MG: 20 INJECTION, SOLUTION EPIDURAL; INFILTRATION; INTRACAUDAL; PERINEURAL at 07:34

## 2022-02-14 RX ADMIN — SODIUM CHLORIDE, SODIUM LACTATE, POTASSIUM CHLORIDE, AND CALCIUM CHLORIDE 100 ML/HR: 600; 310; 30; 20 INJECTION, SOLUTION INTRAVENOUS at 06:00

## 2022-02-14 RX ADMIN — CEFAZOLIN SODIUM 2 G: 100 INJECTION, POWDER, LYOPHILIZED, FOR SOLUTION INTRAVENOUS at 17:33

## 2022-02-14 RX ADMIN — ESMOLOL HYDROCHLORIDE 1200 MCG: 10 INJECTION, SOLUTION INTRAVENOUS at 10:32

## 2022-02-14 RX ADMIN — Medication 2 G: at 07:47

## 2022-02-14 RX ADMIN — MORPHINE SULFATE 4 MG: 4 INJECTION INTRAVENOUS at 13:59

## 2022-02-14 RX ADMIN — PROPOFOL 100 MG: 10 INJECTION, EMULSION INTRAVENOUS at 07:34

## 2022-02-14 RX ADMIN — MIDAZOLAM 1 MG: 1 INJECTION INTRAMUSCULAR; INTRAVENOUS at 07:08

## 2022-02-14 RX ADMIN — PHENYLEPHRINE HYDROCHLORIDE 200 MCG: 10 INJECTION INTRAVENOUS at 08:04

## 2022-02-14 RX ADMIN — Medication 4 MG: at 10:15

## 2022-02-14 RX ADMIN — Medication 3 AMPULE: at 05:59

## 2022-02-14 RX ADMIN — FENTANYL CITRATE 100 MCG: 50 INJECTION INTRAMUSCULAR; INTRAVENOUS at 07:34

## 2022-02-14 RX ADMIN — PHENYLEPHRINE HYDROCHLORIDE 200 MCG: 10 INJECTION INTRAVENOUS at 08:49

## 2022-02-14 NOTE — ANESTHESIA PREPROCEDURE EVALUATION
Relevant Problems   ENDOCRINE   (+) Arthritis       Anesthetic History     PONV          Review of Systems / Medical History  Patient summary reviewed, nursing notes reviewed and pertinent labs reviewed    Pulmonary  Within defined limits                 Neuro/Psych   Within defined limits           Cardiovascular  Within defined limits                Exercise tolerance: >4 METS     GI/Hepatic/Renal     GERD: well controlled           Endo/Other        Arthritis     Other Findings   Comments: S/p acdf           Physical Exam    Airway  Mallampati: II  TM Distance: 4 - 6 cm  Neck ROM: decreased range of motion   Mouth opening: Normal     Cardiovascular  Regular rate and rhythm,  S1 and S2 normal,  no murmur, click, rub, or gallop             Dental  No notable dental hx       Pulmonary  Breath sounds clear to auscultation               Abdominal         Other Findings            Anesthetic Plan    ASA: 2  Anesthesia type: general      Post-op pain plan if not by surgeon: peripheral nerve block single    Induction: Intravenous  Anesthetic plan and risks discussed with: Patient and Spouse

## 2022-02-14 NOTE — PROGRESS NOTES
CXR reviewed  Lung re-expanded. Will disconnect from atrium which will allow for Heimlich valve action.

## 2022-02-14 NOTE — PROCEDURES
PROCEDURE:   13F Ure-Kimberli device placement for pneumothorax. INDICATION:  LEFT PNEUMOTHORAX    DESCRIPTION:  After obtaining informed consent from the patient, the right/left chest was prepped with chlorhexidine in the usual fashion. The third intercostal space was then localized and 1% lidocaine was injected over the underlying rib (5 cc total) anesthetizing the subcutaneous tissue skin rib and intercostal muscles. The depth of the pleural space was then noted by aspirating air into the syringe. Following this the provided scalpel within the kit was used to make a small 3-mm incision in the needle insertion area. After assuring adequate hemostasis the  13 F. Ure-Kimberli device was introduced into the chest using the provided trocar in the kit in the usual fashion. The adhesive type protector was then removed and the hole device affixed to the patient's chest, the tube was attached to suction via a chest tube drainage device.     There were no complications, a chest x-ray was performed afterwards    Josiane Valero MD

## 2022-02-14 NOTE — H&P
History and Physical Updated with no interval change.  Shady Cannon MD H&P Update: No change since previous exam.    Sanjuanita Singh M.D.  2/14/2022

## 2022-02-14 NOTE — H&P
Lissa Stable  History and physical    Subjective  Problem List:   1) Left shoulder pain/DJD   2) Neck pain/ DJD w/ spondylolisthesis    This  year old  presents today for an evaluation of left  shoulder pain. The patient comes in for evaluation, history and physical, and surgical consent signing. The surgical procedure was reviewed in detail with the patient. The risks, including but not limited to anesthesia, infection, deep vein thrombosis, pulmonary embolus, injury to vessels, tendons and nerves, paralysis, stroke, heart attack, loss of limb, and death were discussed. The patient understands the postoperative course and all questions were answered. No guarantees are made and all alternatives are given. The patient wishes to proceed with the surgery. Appropriate literature and relevant material were reviewed with the patient. Surgical procedure:  Left Shoulder Total Arthroplasty    The patient presents today ambulatory without assistance and is unaccompanied today. Allergies: NKDA. Developmental history: Left shoulder MRI WO contrast 7/29/2019     Family health history: Cancer: Father (pancreas); Diabetes: Father     Major events: Bladder tact. , back surgery (spinal stenosis) 1997, Back surgery (extension of spinal stenosis) 2012     Nutrition history: Regular diet     Ongoing medical problems: GERD, anxiety, hypercholesterolemia, bladder prolapsed, depression, osteoporosis, cervical DDD, overactive bladder, Neck DDD, osteopenia of hip, major depression, contact dermatitis, left knee pain, chronic neck pain     Preventive care: Dr. Andie Gonzalez MD (PCP)  Bone density screening 10/2/2020 (osteoporosis-Alendronate Sodium 70 mg) Social history: Patient denies tobacco and EtOH use. The patient is . Objective    Vital Signs: Height 62 inches; Weight 132 lbs; /78 mmHg;  Temp 97.2 F; Pulse 66 bpm; Oxygen Saturation 100 %     The patient is a 67year old female who appears her given age and is in no apparent distress. Oriented to person, place and time. Mood and affect are normal and appropriate to the situation. Assessment of respiratory effort reveals even and nonlabored respirations. The patient exhibits reciprocal gait. The patient was able to climb up and down off the examination table. Left Shoulder MRI (performed on 7/29/2019 @ [address] [place] [address]) Impression:   1) Massive rotator cuff tear with retraction and atrophy. There is also reduced subacromional height. 2) Advanced osteoarthritis change of the glenohumeral joint. 3) AC joint arthrosis. 4) Medial dislocation of the long head of the biceps. Left Shoulder X-Rays (3 views - CPT 02219) taken today revealed: complete loss of subacromial joint space. severe glenohumeral joint space narrowing-bone on bone. Left Shoulder Examination:    Inspection reveals no external signs of injury or acute trauma. The palpation of the shoulder reveals diffuse tenderness to the left shoulder. Shoulder forward flexion (active):  full, with pain. Shoulder abduction (active):  full, with pain. Internal rotation: to the level of T8, with pain. The muscle tone is normal.     The muscle strength 4/5     Vascular: Peripheral pulses normal 2/2 upper extremities. Neurologic: Sensation is intact and symmetrical in all dermatome's upper extremities. Assessment    DIAGNOSIS:        Pain in left shoulder [ICD-10: M25.512], [ICD-9: 719.41], [SNOMED: 73486666542471865]        Primary osteoarthritis, left shoulder [ICD-10: M19.012], [ICD-9: 715.11], [SNOMED: 551870664]        Complete rotator cuff tear of left shoulder, not specified as traumatic [ICD-10: Wiliam Vyasle, [ICD-9: 727.61]        Pre-op evaluation [ICD-10: B34.489], [SNOMED: 662699314]  Plan  We discussed the pathophysiology of the diagnosis and options for treatment. We reviewed the conservative treatment options in detail.      We discussed the surgical option(s) (left total shoulder replacement). We have talked about the complications of surgery, including the possibility of damage to nerves, arteries, vessels and tendons, bleeding, infection, the possibility of sustaining medical problems, even death. We have talked about the possibility that the condition may not improve after surgery  or that it could actually be worse. The patient seems to understand and accept these possible complications. The patient understands and wishes to proceed with surgery at this time. We discussed the unique risk potential complications inherent with reverse shoulder arthroplasty. She understands these and excepting of these and was to proceed with the proposed procedure. Informed surgical consent obtained. Surgery will be performed at [facility] on 2-14-22. The patient states that she would be going home once she is discharged from the hospital.    The patient states that she uses [facility] in [facility] as her pharmacy. All questions answered at this time. The patient knows to contact the office with any questions or concerns. VERIFICATION OF ANCILLARY DOCUMENTATION: The portions of the chart completed by ancillary personnel were reviewed by the physician. Elvie Wilson RTC : post-op.     MEDICATIONS:        Triamcinolone Acetonide 0.05 % External Ointment 1 application topically to affected area 2 times per day for 2 weeks        Oxybutynin Chloride ER 10 MG Oral Tablet Extended Release 24 Hour 1 tablet (10 mg) orally daily        PARoxetine HCl 10 MG Oral Tablet 1 tablet (10 mg) orally daily in the morning        Alendronate Sodium 70 MG Oral Tablet 1 tablet (70 mg) orally weekly 30 minutes before the first food, beverage or medicine of the day with plain water        Meloxicam 7.5 MG Oral Tablet 1 tablet (7.5 mg) orally daily        Daily Multivitamin Oral Capsule one capsule daily        Protonix 40 MG Oral Tablet Delayed Release 1 tablet (40 mg) orally daily

## 2022-02-14 NOTE — PROGRESS NOTES
Patient in bed with spouse at bedside. No c/o discomfort voiced at this time. Hourly rounds performed this shift. Bed lowered and locked. Call light within reach. All needs met at this time. Bedside shift report given to oncoming nurse.

## 2022-02-14 NOTE — ANESTHESIA PROCEDURE NOTES
Peripheral Block    Start time: 2/14/2022 6:57 AM  End time: 2/14/2022 7:00 AM  Performed by: Kelsea Steve MD  Authorized by: Kelsea Steve MD       Pre-procedure: Indications: at surgeon's request and post-op pain management    Preanesthetic Checklist: patient identified, risks and benefits discussed, site marked, timeout performed, anesthesia consent given and patient being monitored    Timeout Time: 06:57 EST          Block Type:   Block Type:   Interscalene  Laterality:  Left  Monitoring:  Standard ASA monitoring, responsive to questions, oxygen, continuous pulse ox, frequent vital sign checks and heart rate  Injection Technique:  Single shot  Procedures: ultrasound guided    Patient Position: supine  Prep: chlorhexidine    Location:  Interscalene  Needle Type:  Stimuplex  Needle Gauge:  20 G  Needle Localization:  Ultrasound guidance  Medication Injected:  Ropivacaine (PF) (NAROPIN)(0.5%) 5 mg/mL injection, 20 mL  dexamethasone (DECADRON) 4 mg/mL injection, 4 mg  Med Admin Time: 2/14/2022 7:00 AM    Assessment:  Number of attempts:  1  Injection Assessment:  Incremental injection every 5 mL, negative aspiration for CSF, no paresthesia, ultrasound image on chart, local visualized surrounding nerve on ultrasound, negative aspiration for blood and no intravascular symptoms  Patient tolerance:  Patient tolerated the procedure well with no immediate complications

## 2022-02-14 NOTE — ANESTHESIA PREPROCEDURE EVALUATION
Anesthetic History     PONV          Review of Systems / Medical History  Patient summary reviewed, nursing notes reviewed and pertinent labs reviewed    Pulmonary  Within defined limits                 Neuro/Psych   Within defined limits           Cardiovascular                  Exercise tolerance: >4 METS     GI/Hepatic/Renal     GERD: well controlled           Endo/Other        Arthritis     Other Findings   Comments: S/p acdf           Physical Exam    Airway  Mallampati: II  TM Distance: 4 - 6 cm  Neck ROM: decreased range of motion   Mouth opening: Normal     Cardiovascular  Regular rate and rhythm,  S1 and S2 normal,  no murmur, click, rub, or gallop             Dental  No notable dental hx       Pulmonary  Breath sounds clear to auscultation               Abdominal         Other Findings        Anesthetic History   No history of anesthetic complications           Review of Systems / Medical History  Patient summary reviewed and pertinent labs reviewed    Pulmonary  Within defined limits               Neuro/Psych              Cardiovascular                Exercise tolerance: >4 METS     GI/Hepatic/Renal     GERD: well controlled             Endo/Other        Arthritis     Other Findings            Physical Exam    Airway  Mallampati: I  TM Distance: > 6 cm  Neck ROM: normal range of motion   Mouth opening: Normal     Cardiovascular  Regular rate and rhythm,  S1 and S2 normal,  no murmur, click, rub, or gallop  Rhythm: regular  Rate: normal         Dental  No notable dental hx       Pulmonary  Breath sounds clear to auscultation               Abdominal        Comments: DEBI  HEENT grossly nl  Chest clear  Heart RSR s murmer  Abd soft normal bowel sounds  Remainder deferred Other Findings            Anesthetic Plan    ASA: 2  Anesthesia type: MAC            Anesthetic plan and risks discussed with: Patient                Anesthetic Plan    ASA: 2  Anesthesia type: general      Post-op pain plan if not by surgeon: peripheral nerve block single    Induction: Intravenous  Anesthetic plan and risks discussed with: Patient and Spouse

## 2022-02-14 NOTE — PERIOP NOTES
TRANSFER - OUT REPORT:    Verbal report given to Princess Lexa   on Jose J Gerardo  being transferred to [facility]   for routine post - op       Report consisted of patients Situation, Background, Assessment and   Recommendations(SBAR). Information from the following report(s) OR Summary, Procedure Summary, Intake/Output and MAR was reviewed with the receiving nurse. Lines:   Peripheral IV 02/14/22 Posterior;Right Forearm (Active)   Site Assessment Clean, dry, & intact 02/14/22 1137   Phlebitis Assessment 0 02/14/22 1137   Infiltration Assessment 0 02/14/22 1137   Dressing Status Clean, dry, & intact 02/14/22 1137   Dressing Type Tape;Transparent 02/14/22 1137   Hub Color/Line Status Patent 02/14/22 1137        Opportunity for questions and clarification was provided. Patient transported with:   Tech    VTE prophylaxis orders have been written for Jose J Gerardo. Patient and family given floor number and nurses name. Family updated re: pt status after security code verified.

## 2022-02-14 NOTE — PROGRESS NOTES
ORTHO                                Discussed with patient about her postop preumothorax. She understands. Is comfortable.

## 2022-02-14 NOTE — PROGRESS NOTES
02/14/22 1555   Dual Skin Pressure Injury Assessment   Dual Skin Pressure Injury Assessment WDL   Second Care Provider (Based on [address] [place] [address]) Dipti Hua RN   Skin Integumentary   Skin Integumentary (WDL) X    Pressure  Injury Documentation No Pressure Injury Noted-Pressure Ulcer Prevention Initiated   Skin Integrity Incision (comment); Other (comment)  (left shoulder incision, left ure ambrose device)   Skin Color Appropriate for ethnicity   Skin Condition/Temp Dry; Warm   Turgor Epidermis thin w/ loss of subcut tissue   Left shoulder incision red to color and bulging from skin. Incision clean, dry, intact.

## 2022-02-14 NOTE — CONSULTS
Kyle Arboleda  Admission Date: 2/14/2022         Daily Progress Note: 2/14/2022    The patient's chart is reviewed and the patient is discussed with the staff. Background: 65year old admitted for an elective left arthroplasty. Post op xray showed a left pneumothorax. She is having some chest discomfort but is not short of breath. Her oyxgen saturation is normal on room air. Subjective:    Denies dyspnea but has some left chest discomfort.      Current Facility-Administered Medications   Medication Dose Route Frequency    lactated Ringers infusion  100 mL/hr IntraVENous CONTINUOUS    sodium chloride (NS) flush 5-40 mL  5-40 mL IntraVENous Q8H    sodium chloride (NS) flush 5-40 mL  5-40 mL IntraVENous PRN    oxyCODONE IR (ROXICODONE) tablet 5 mg  5 mg Oral ONCE PRN    HYDROmorphone (DILAUDID) injection 0.5 mg  0.5 mg IntraVENous Multiple    naloxone (NARCAN) injection 0.1 mg  0.1 mg IntraVENous PRN    flumazeniL (ROMAZICON) 0.1 mg/mL injection 0.2 mg  0.2 mg IntraVENous Multiple    promethazine (PHENERGAN) with saline injection 6.25 mg  6.25 mg IntraVENous Q15MIN PRN    diphenhydrAMINE (BENADRYL) injection 12.5 mg  12.5 mg IntraVENous Q15MIN PRN     Review of Systems  + left chest discomfort, no dyspnea  Constitutional: negative for fever, chills, sweats  Cardiovascular: negative for chest pain, palpitations, syncope, edema  Gastrointestinal:  negative for dysphagia, reflux, vomiting, diarrhea, abdominal pain, or melena  Neurologic:  negative for focal weakness, numbness, headache  Objective:     Vitals:    02/14/22 1111 02/14/22 1117 02/14/22 1122 02/14/22 1127   BP: (!) 96/54 (!) 89/55 (!) 85/53 (!) 80/52   Pulse: 95 95 99 (!) 102   Resp: 16 16 16 16   Temp:       SpO2: 97% 95% 93% 93%   Weight:       Height:           Intake/Output Summary (Last 24 hours) at 2/14/2022 1137  Last data filed at 2/14/2022 1034  Gross per 24 hour   Intake 1220 ml   Output 350 ml   Net 870 ml Physical Exam:   Constitution:  the patient is well developed and in no acute distress  HEENT:  Sclera clear, pupils equal, oral mucosa moist  Respiratory: clear bilaterally but do not hear breath sounds on the left from anterior. Oxygen placed at 6 liters. Cardiovascular:  RRR without M,G,R  Gastrointestinal: soft and non-tender; with positive bowel sounds. Musculoskeletal: warm without cyanosis. There is no lower extremity edema. Skin:  no jaundice or rashes, left shoulder wound  Neurologic: no gross neuro deficits     Psychiatric:  alert and oriented, calm  CXR:       LAB: none    Assessment and Plan:  (Medical Decision Making)   Principal Problem:    Osteoarthritis of left shoulder (2/13/2022)   s/p surgery    Active Problems:    Localized osteoarthritis of left shoulder (2/14/2022)   as above      Pneumothorax on left (2/14/2022)   CXR reviewed and discussed with Dr. Indu Dover. Suspect that she will need Uriseal placed. Bronch lab notified. Will plan for follow up CXR today and in AM.     Her  updated in the waiting room. More than 50% of the time documented was spent in face-to-face contact with the patient and in the care of the patient on the floor/unit where the patient is located. Janett Swain NP     I have spoken with and examined the patient. I agree with the above assessment and plan as documented. Gen: pleasant  Lungs:  Decreased on left. L surgical wound c/d/i  Heart:  RRR with no Murmur/Rubs/Gallops  Abd:NTND  Ext: No edema    Plan to place Uresil thoracostomy device for moderate pneumothorax. Will order periprocedural medications now. Discussed details of placement with patient and her .     Tay Julien MD

## 2022-02-14 NOTE — BRIEF OP NOTE
Brief Postoperative Note    Patient: Carmen Rasmussen  YOB: 1945  MRN: [id number]    Date of Procedure: 2/14/2022     Pre-Op Diagnosis: Primary osteoarthritis of left shoulder [M19.012]RC arhtropathy  Post-Op Diagnosis: Same as preoperative diagnosis. Procedure(s):  LEFT SHOULDER ARTHROPLASTY TOTAL REVERSE  CHOICE ANES    Surgeon(s):  Oswaldo Chandra MD    Surgical Assistant: None    Anesthesia: General     Estimated Blood Loss (mL): 900     Complications: None    Specimens: * No specimens in log *     Implants:   Implant Name Type Inv.  Item Serial No.  Lot No. LRB No. Used Action   BASEPLT W/ADAPTER ELIZA 25MM -- COMPREHENSIVE - [id number]  BASEPLT W/ADAPTER ELIZA 25MM -- COMPREHENSIVE  [employer]_ [id number] Left 1 Implanted   SCR CTRL RVS 6.5X25MM STRL/RST -- COMPREHENSIVE - [id number]  SCR CTRL RVS 6.5X25MM STRL/RST -- COMPREHENSIVE  [employer]_ [id number] Left 1 Implanted   SCR LCK 3.5 HEX 4.75X30 STRL -- COMPREHENSIVE - [id number]  SCR LCK 3.5 HEX 4.75X30 STRL -- COMPREHENSIVE  [employer]_ [id number] Left 1 Implanted   SCR LCK 3.5 HEX 4.75X35 STRL -- COMPREHENSIVE - [id number]  SCR LCK 3.5 HEX 4.75X35 STRL -- COMPREHENSIVE  [employer]_ [id number] Left 1 Implanted   GLENOSPHERE RVS STD 0D 36MM -- COMPREHENSIVE - [id number]  GLENOSPHERE RVS STD 0D 36MM -- COMPREHENSIVE  [employer]_ [id number] Left 1 Implanted   STEM HUM EZIO MINI 13MM -- COMPREHENSIVE - [id number]  STEM HUM EZIO MINI 13MM -- COMPREHENSIVE  [employer]_ [id number] Left 1 Implanted   TRAY HUM STD 40MM SUZANNE +5MM -- COMPREHENSIVE - [id number]  TRAY HUM STD 40MM SUZANNE +5MM -- COMPREHENSIVE  [employer]_ [id number] Left 1 Implanted   BEARING HUM STD 36MM SUZANNE -- COMPREHENSIVE PROLONG - [id number]  BEARING HUM STD 36MM SUZANNE -- COMPREHENSIVE PROLONG  [employer]_ [id number] Left 1 Implanted       Drains: * No LDAs found *    Findings: severe DJD and RC arthropathy    Electronically Signed by Lord Louie MD on 2/14/2022 at 10:31 AM

## 2022-02-14 NOTE — INTERVAL H&P NOTE
Update History & Physical    The Patient's History and Physical of today was reviewed with the patient and I examined the patient. There was no change. The surgical site was confirmed by the patient and me. Plan:  The risk, benefits, expected outcome, and alternative to the recommended procedure have been discussed with the patient. Patient understands and wants to proceed with the procedure.     Electronically signed by Reyes Reyes MD on 2/14/2022 at 2:39 PM

## 2022-02-14 NOTE — H&P (VIEW-ONLY)
Everett Arboleda  Admission Date: 2/14/2022         Daily Progress Note: 2/14/2022    The patient's chart is reviewed and the patient is discussed with the staff. Background: 65year old admitted for an elective left arthroplasty. Post op xray showed a left pneumothorax. She is having some chest discomfort but is not short of breath. Her oyxgen saturation is normal on room air. Subjective:    Denies dyspnea but has some left chest discomfort.      Current Facility-Administered Medications   Medication Dose Route Frequency    lactated Ringers infusion  100 mL/hr IntraVENous CONTINUOUS    sodium chloride (NS) flush 5-40 mL  5-40 mL IntraVENous Q8H    sodium chloride (NS) flush 5-40 mL  5-40 mL IntraVENous PRN    oxyCODONE IR (ROXICODONE) tablet 5 mg  5 mg Oral ONCE PRN    HYDROmorphone (DILAUDID) injection 0.5 mg  0.5 mg IntraVENous Multiple    naloxone (NARCAN) injection 0.1 mg  0.1 mg IntraVENous PRN    flumazeniL (ROMAZICON) 0.1 mg/mL injection 0.2 mg  0.2 mg IntraVENous Multiple    promethazine (PHENERGAN) with saline injection 6.25 mg  6.25 mg IntraVENous Q15MIN PRN    diphenhydrAMINE (BENADRYL) injection 12.5 mg  12.5 mg IntraVENous Q15MIN PRN     Review of Systems  + left chest discomfort, no dyspnea  Constitutional: negative for fever, chills, sweats  Cardiovascular: negative for chest pain, palpitations, syncope, edema  Gastrointestinal:  negative for dysphagia, reflux, vomiting, diarrhea, abdominal pain, or melena  Neurologic:  negative for focal weakness, numbness, headache  Objective:     Vitals:    02/14/22 1111 02/14/22 1117 02/14/22 1122 02/14/22 1127   BP: (!) 96/54 (!) 89/55 (!) 85/53 (!) 80/52   Pulse: 95 95 99 (!) 102   Resp: 16 16 16 16   Temp:       SpO2: 97% 95% 93% 93%   Weight:       Height:           Intake/Output Summary (Last 24 hours) at 2/14/2022 1137  Last data filed at 2/14/2022 1034  Gross per 24 hour   Intake 1220 ml   Output 350 ml   Net 870 ml Physical Exam:   Constitution:  the patient is well developed and in no acute distress  HEENT:  Sclera clear, pupils equal, oral mucosa moist  Respiratory: clear bilaterally but do not hear breath sounds on the left from anterior. Oxygen placed at 6 liters. Cardiovascular:  RRR without M,G,R  Gastrointestinal: soft and non-tender; with positive bowel sounds. Musculoskeletal: warm without cyanosis. There is no lower extremity edema. Skin:  no jaundice or rashes, left shoulder wound  Neurologic: no gross neuro deficits     Psychiatric:  alert and oriented, calm  CXR:       LAB: none    Assessment and Plan:  (Medical Decision Making)   Principal Problem:    Osteoarthritis of left shoulder (2/13/2022)   s/p surgery    Active Problems:    Localized osteoarthritis of left shoulder (2/14/2022)   as above      Pneumothorax on left (2/14/2022)   CXR reviewed and discussed with Dr. Norma Galvin. Suspect that she will need Uriseal placed. Bronch lab notified. Will plan for follow up CXR today and in AM.     Her  updated in the waiting room. More than 50% of the time documented was spent in face-to-face contact with the patient and in the care of the patient on the floor/unit where the patient is located. Duglas Contreras, TIFFANY     I have spoken with and examined the patient. I agree with the above assessment and plan as documented. Gen: pleasant  Lungs:  Decreased on left. L surgical wound c/d/i  Heart:  RRR with no Murmur/Rubs/Gallops  Abd:NTND  Ext: No edema    Plan to place Uresil thoracostomy device for moderate pneumothorax. Will order periprocedural medications now. Discussed details of placement with patient and her .     Leonarda Bernardo MD

## 2022-02-15 ENCOUNTER — APPOINTMENT (OUTPATIENT)
Dept: GENERAL RADIOLOGY | Age: 77
DRG: 483 | End: 2022-02-15
Attending: NURSE PRACTITIONER
Payer: MEDICARE

## 2022-02-15 ENCOUNTER — APPOINTMENT (OUTPATIENT)
Dept: GENERAL RADIOLOGY | Age: 77
DRG: 483 | End: 2022-02-15
Attending: INTERNAL MEDICINE
Payer: MEDICARE

## 2022-02-15 VITALS
HEIGHT: 63 IN | TEMPERATURE: 97.9 F | OXYGEN SATURATION: 98 % | BODY MASS INDEX: 23 KG/M2 | WEIGHT: 129.8 LBS | SYSTOLIC BLOOD PRESSURE: 115 MMHG | RESPIRATION RATE: 12 BRPM | DIASTOLIC BLOOD PRESSURE: 56 MMHG | HEART RATE: 92 BPM

## 2022-02-15 LAB
HCT VFR BLD AUTO: 27.8 % (ref 35.8–46.3)
HGB BLD-MCNC: 8.8 G/DL (ref 11.7–15.4)

## 2022-02-15 PROCEDURE — 99232 SBSQ HOSP IP/OBS MODERATE 35: CPT | Performed by: INTERNAL MEDICINE

## 2022-02-15 PROCEDURE — 85018 HEMOGLOBIN: CPT

## 2022-02-15 PROCEDURE — 71045 X-RAY EXAM CHEST 1 VIEW: CPT

## 2022-02-15 PROCEDURE — 74011250636 HC RX REV CODE- 250/636: Performed by: ORTHOPAEDIC SURGERY

## 2022-02-15 PROCEDURE — 74011000250 HC RX REV CODE- 250: Performed by: ORTHOPAEDIC SURGERY

## 2022-02-15 PROCEDURE — 97535 SELF CARE MNGMENT TRAINING: CPT

## 2022-02-15 PROCEDURE — 97116 GAIT TRAINING THERAPY: CPT

## 2022-02-15 PROCEDURE — 74011250637 HC RX REV CODE- 250/637: Performed by: ORTHOPAEDIC SURGERY

## 2022-02-15 PROCEDURE — 97161 PT EVAL LOW COMPLEX 20 MIN: CPT

## 2022-02-15 PROCEDURE — 36415 COLL VENOUS BLD VENIPUNCTURE: CPT

## 2022-02-15 PROCEDURE — 2709999900 HC NON-CHARGEABLE SUPPLY

## 2022-02-15 PROCEDURE — 97165 OT EVAL LOW COMPLEX 30 MIN: CPT

## 2022-02-15 RX ORDER — OXYCODONE HYDROCHLORIDE 5 MG/1
5 TABLET ORAL
Qty: 20 TABLET | Refills: 0 | Status: SHIPPED | OUTPATIENT
Start: 2022-02-15 | End: 2022-02-15 | Stop reason: SDUPTHER

## 2022-02-15 RX ORDER — OXYCODONE HYDROCHLORIDE 5 MG/1
5 TABLET ORAL
Qty: 20 TABLET | Refills: 0 | Status: SHIPPED | OUTPATIENT
Start: 2022-02-15 | End: 2022-02-20

## 2022-02-15 RX ORDER — ASPIRIN 81 MG/1
81 TABLET ORAL 2 TIMES DAILY
Qty: 1 TABLET | Refills: 0 | Status: SHIPPED | OUTPATIENT
Start: 2022-02-15

## 2022-02-15 RX ADMIN — PAROXETINE HYDROCHLORIDE 10 MG: 20 TABLET, FILM COATED ORAL at 09:14

## 2022-02-15 RX ADMIN — PANTOPRAZOLE SODIUM 40 MG: 40 TABLET, DELAYED RELEASE ORAL at 09:14

## 2022-02-15 RX ADMIN — LORAZEPAM 1 MG: 1 TABLET ORAL at 09:14

## 2022-02-15 RX ADMIN — SODIUM CHLORIDE 75 ML/HR: 900 INJECTION, SOLUTION INTRAVENOUS at 05:20

## 2022-02-15 RX ADMIN — HYDROCODONE BITARTRATE AND ACETAMINOPHEN 1 TABLET: 10; 325 TABLET ORAL at 10:27

## 2022-02-15 RX ADMIN — HYDROCODONE BITARTRATE AND ACETAMINOPHEN 1 TABLET: 10; 325 TABLET ORAL at 16:03

## 2022-02-15 RX ADMIN — CEFAZOLIN SODIUM 2 G: 100 INJECTION, POWDER, LYOPHILIZED, FOR SOLUTION INTRAVENOUS at 01:27

## 2022-02-15 RX ADMIN — SODIUM CHLORIDE, PRESERVATIVE FREE 10 ML: 5 INJECTION INTRAVENOUS at 05:20

## 2022-02-15 RX ADMIN — OXYBUTYNIN CHLORIDE 5 MG: 5 TABLET, EXTENDED RELEASE ORAL at 09:14

## 2022-02-15 RX ADMIN — ASPIRIN 81 MG: 81 TABLET ORAL at 09:14

## 2022-02-15 RX ADMIN — HYDROCODONE BITARTRATE AND ACETAMINOPHEN 1 TABLET: 10; 325 TABLET ORAL at 05:40

## 2022-02-15 RX ADMIN — Medication 1 TABLET: at 09:13

## 2022-02-15 NOTE — OP NOTES
[facility]  OPERATIVE REPORT    Name:  Sal Mario  MR#:  [id number]  :  1945  ACCOUNT #:  [de-identified]  DATE OF SERVICE:  2022    PREOPERATIVE DIAGNOSIS:  Chronic left shoulder rotator cuff arthropathy. POSTOPERATIVE DIAGNOSIS:  Chronic left shoulder rotator cuff arthropathy. PROCEDURE PERFORMED:  Left shoulder Biomet Lesvia press-fit reverse shoulder arthroplasty. SURGEON:  Ronna Larose MD    ASSISTANT:  Nestor Caraballo MD.  Because of the complexity of this procedure, it was felt that a trained shoulder surgeon assistant would benefit the patient. ANESTHESIA:  General.    COMPLICATIONS:  None. SPECIMENS REMOVED:  none. IMPLANTS:  See nurse note. ESTIMATED BLOOD LOSS:  200-300 ml. BRIEF HISTORY:  The patient is a 26-year-old female with a history of progressively disabling left shoulder pain, which has been refractory to conservative treatment. Subsequent plain films showed severe loss of the subacromial space consistent with rotator cuff arthropathy and subsequent MRI scan was consistent with this diagnosis of severe rotator cuff atrophy and massive tearing and degeneration or atrophy of the musculature of the rotator cuff. We discussed several treatment options with the patient and we feel that the only viable alternative to give the patient a reasonable range of motion as well as relief of her pain would be a reverse shoulder arthroplasty. We discussed this procedure with the patient in great detail, especially its specific potential complications and she understands complications to include infection, blood clots, dislocation, wearing out of components, loosening of components, fracture in the shoulder and other less common but potentially serious complications may occur. Informed consent was obtained. PROCEDURE:  The patient was seen in the preoperative area.   She had a preoperative interscalene-type block for pain control placed by Anesthesia. Her shoulder was marked. Her chart was updated. Her permit was signed. She was taken to the operating room #8 where a successful general anesthetic was achieved and she was placed in a beach-chair reclining position with all prominences well-padded. The left shoulder was prepped and draped into a sterile field. A time-out was then effected by the surgeon, was confirmed by the operative team.  We proceeded with a deltopectoral-type incision in the skin after examination of the shoulder under anesthesia. Dissection was carried through the skin and subcutaneous sharply. Dissection was carried down to the deltopectoral interval and entered into. The clavipectoral fascia was palpated. We then cut along the palpable biceps tendon. There was noted to be minimal rotator cuff tissue, essentially just capsule, in the supraspinatus area and the subscap. We then removed this tissue, dissected out the biceps tendon, amputated it off, performed a tenotomy and then off of its glenoid insertion. We then utilized the extramedullary guide to clear any soft tissue inferiorly down to the deltopectoral interval and releasing about 1 cm of the pectoralis insertion. We removed capsular loose bodies. We then cleared soft tissues circumferentially around the humeral head, utilized the guide from the [employer] instrumentation, made our osteotomy of the articular surface. We then re-cut one to include a better angle. We then broached and reamed to a size 12 which we initially upped to a size 13 after sizing the glenosphere invasion. Once we had our broach in place, our trial broach, we then placed a cap over this. We retracted the humeral shaft out of the way. We circumferentially removed soft tissue from the labrum as well as the remaining stump of the biceps tendon.   We then centered a drill pin in the center of the glenoid at about 10-degree inferior inclination and then passed the guide pin in and then we over-reamed this. We then placed in the glenosphere base plate, which has a cancellous bone, placed in this place the central screw and then placed in two inferior screws to avoid any problems with the acromion. We then placed on our glenosphere itself, which was a 36-mm standard, with the inferior offset pointing inferiorly. This was then tapped in place as it was a Vance taper. We then broached and reamed to a 13 humeral stem. We trialed several different configurations for the humeral component and decided on the permanent sizing after trialing several different sizes. We did use a 30 mm x 18 mm long mini humeral stem. We used the highly cross-linked poly bearing with a 36-mm diameter and a mini humeral tray +5 thickness, 0-mm offset, 40-mm diameter. We then tried different configurations of the trial picking on aforementioned for the final, reduced this in place, taken through range of motion and found to be very stable construct. We thoroughly irrigated and then closed the fascial layer with interrupted figure-of-eight #1 Vicryl, deeply with 0 Vicryl and 2-0 Vicryl, subcuticular suture of 3-0 Monocryl and then Dermabond over this. No other dressing was applied. She was placed in a sling. No complications were noted at this time and she will be discharged to the recovery room to follow up in our office. ADDENDUM:  Please note that in the subsequent films we obtained postoperatively and we were called by Anesthesia, there appeared to be a medium-sized pneumothorax on the left side. We immediately made a stat consult with Pulmonology. I proceeded to the PACU and noted that the patient was in no distress and he respiratory status was stable. They will follow the patient on the floor and obtain further studies.       MD ROBERTO [id number]  D:  02/14/2022 22:21  T:  02/15/2022 1:09  JOB #:  [id number]

## 2022-02-15 NOTE — PROGRESS NOTES
ACUTE OT GOALS:  (Developed with and agreed upon by patient and/or caregiver.)  1. Pt will toilet with independently (MET)   2. Pt will complete functional mobility for ADLs with SBA (MET)  3. Pt will complete upper body dressing with mod I using AE as needed (MET)  4. Pt will demonstrate independence with HEP to promote increased LUE ROM functional use for ADLs (MET)      Timeframe: 7 days      OCCUPATIONAL THERAPY ASSESSMENT: Initial Assessment, Daily Note and Discharge OT Treatment Day # 1    Ivan Tang is a 65 y.o. female   PRIMARY DIAGNOSIS: Osteoarthritis of left shoulder  Primary osteoarthritis of left shoulder [M19.012]  Localized osteoarthritis of left shoulder [M19.012]  Procedure(s) (LRB):  LEFT SHOULDER ARTHROPLASTY TOTAL REVERSE  CHOICE ANES (Left)  1 Day Post-Op  Reason for Referral:  Impaired ADLs post op L TSA  ICD-10: Treatment Diagnosis: Other lack of cordination (R27.8)  INPATIENT: Payor: Eden Andre / Plan: Leah Memos / Product Type: Managed Care Medicare /   ASSESSMENT:     REHAB RECOMMENDATIONS:   Recommendation to date pending progress:  Setting:   No further skilled therapy after discharge from hospital  Equipment:    None     PRIOR LEVEL OF FUNCTION:  (Prior to Hospitalization)  INITIAL/CURRENT LEVEL OF FUNCTION:  (Based on today's evaluation)   Bathing:   Independent  Dressing:   Independent  Feeding/Grooming:   Independent  Toileting:   Independent  Functional Mobility:   Independent Bathing:   Modified Independent  Dressing:   Modified Independent  Feeding/Grooming:   Independent  Toileting:   Independent  Functional Mobility:   Standby Assistance     ASSESSMENT:  Ms. Agatha Diana is L hand dominant, post op reverse L TSA. Per MD, pt does not have ROM restrictions except shoulder internal rotation. Pt educated on adaptive techniques for ADLs for ease of tasks post op and on AROM HEP.  Pt demonstrated ADLs independently- mod I and mobility for ADLs with SBA w/o an AD. Pt does not require further skilled OT services at this time as all goals were met during initial session. SUBJECTIVE:   Ms. Landy West states, \"I wish I had a better sling. \"    SOCIAL HISTORY/LIVING ENVIRONMENT: Lives w/ spouse. Independent, no AD.  WIS, shower chair, BSC. 1L, 2 steps       OBJECTIVE:     PAIN: VITAL SIGNS: LINES/DRAINS:   Pre Treatment: Pain Screen  Pain Scale 1: Numeric (0 - 10)  Pain Intensity 1: 7  Pain Location 1: Shoulder  Pain Orientation 1: Left  Pain Intervention(s) 1:  (nurse medicated)  Post Treatment: 6   None  O2 Device: None (Room air)     GROSS EVALUATION:  BUE Within Functional Limits Abnormal/ Functional Abnormal/ Non-Functional (see comments) Not Tested Comments:   AROM [] [x] [] [] < L shoulder post op   PROM [] [] [] []    Strength [] [x] [] []    Balance [x] [] [] []    Posture [] [] [] []    Sensation [] [] [] []    Coordination [x] [] [] []    Tone [] [] [] []    Edema [] [] [] []    Activity Tolerance [x] [] [] []     [] [] [] []      COGNITION/  PERCEPTION: Intact Impaired   (see comments) Comments:   Orientation [x] []    Vision [x] []    Hearing [x] []    Judgment/ Insight [x] []    Attention [x] []    Memory [x] []    Command Following [x] []    Emotional Regulation [x] []     [] []      ACTIVITIES OF DAILY LIVING: I Mod I S SBA CGA Min Mod Max Total NT Comments   BASIC ADLs:              Bathing/ Showering [] [] [] [] [] [] [] [] [] []    Toileting [x] [] [] [] [] [] [] [] [] []    Dressing [] [x] [] [] [] [] [] [] [] []    Feeding [] [] [] [] [] [] [] [] [] []    Grooming [x] [] [] [] [] [] [] [] [] []    Personal Device Care [] [] [] [] [] [] [] [] [] []    Functional Mobility [] [] [] [x] [] [] [] [] [] []    I=Independent, Mod I=Modified Independent, S=Supervision, SBA=Standby Assistance, CGA=Contact Guard Assistance,   Min=Minimal Assistance, Mod=Moderate Assistance, Max=Maximal Assistance, Total=Total Assistance, NT=Not Tested    MOBILITY: I Mod I S SBA CGA Min Mod Max Total  NT x2 Comments:   Supine to sit [] [] [] [] [] [] [] [] [] [] []    Sit to supine [] [] [] [] [] [] [] [] [] [] []    Sit to stand [] [] [x] [] [] [] [] [] [] [] []    Bed to chair [] [] [x] [] [] [] [] [] [] [] []    I=Independent, Mod I=Modified Independent, S=Supervision, SBA=Standby Assistance, CGA=Contact Guard Assistance,   Min=Minimal Assistance, Mod=Moderate Assistance, Max=Maximal Assistance, Total=Total Assistance, NT=Not Tested    [address] [place] [address] AM-PAC 6 Clicks   Daily Activity Inpatient Short Form        How much help from another person does the patient currently need. .. Total A Lot A Little None   1. Putting on and taking off regular lower body clothing? [] 1   [] 2   [] 3   [x] 4   2. Bathing (including washing, rinsing, drying)? [] 1   [] 2   [] 3   [x] 4   3. Toileting, which includes using toilet, bedpan or urinal?   [] 1   [] 2   [] 3   [x] 4   4. Putting on and taking off regular upper body clothing? [] 1   [] 2   [] 3   [x] 4   5. Taking care of personal grooming such as brushing teeth? [] 1   [] 2   [] 3   [x] 4   6. Eating meals? [] 1   [] 2   [] 3   [x] 4   © 2007, Trustees of [address] [place] [address], under license to [employer]. All rights reserved     Score:  Initial: 24 Most Recent: X (Date: -- )   Interpretation of Tool:  Represents activities that are increasingly more difficult (i.e. Bed mobility, Transfers, Gait). TREATMENT:     EVALUATION: Low Complexity : (Untimed Charge)    TREATMENT:   ($$ Self Care/Home Management: 8-22 mins    )  Self Care (10 Minutes): Self care including Toileting, Upper Body Dressing, Lower Body Dressing and Grooming to increase independence and decrease level of assistance required.     TREATMENT GRID:  N/A    AFTER TREATMENT POSITION/PRECAUTIONS:  Chair, Needs within reach and RN notified    INTERDISCIPLINARY COLLABORATION:  RN/PCT    TOTAL TREATMENT DURATION:  OT Patient Time In/Time Out  Time In: 4485  Time Out: Laci 64 Jackie Castro

## 2022-02-15 NOTE — ANESTHESIA POSTPROCEDURE EVALUATION
Procedure(s):  LEFT SHOULDER ARTHROPLASTY TOTAL REVERSE  CHOICE ANES. general, regional    Anesthesia Post Evaluation      Multimodal analgesia: multimodal analgesia used between 6 hours prior to anesthesia start to PACU discharge  Patient location during evaluation: bedside  Patient participation: complete - patient participated  Level of consciousness: awake and alert  Pain management: adequate  Airway patency: patent  Anesthetic complications: no  Cardiovascular status: acceptable  Respiratory status: acceptable  Hydration status: acceptable  Comments: Patient with post operative pneumothorax noted on xray. This was then discussed with surgeon and pulmonary was consulted. Ure-Kimberli was placed without difficulty and pneumothorax improved. During and prior to procedure patient had no issues with oxygenation or ventilation. Intubation was atraumatic, no recruitment maneuvers or increased O2 requirements intraop. Heart rate remained in normal range except for tachycardia on emergence. In recovery patients sats remained low 90s however after ptx noticed was placed on 6l NC and sats remained %. Rest of vitals remained wnl. Discussed with patient and her  both pneumothorax and management steps. Both voiced understanding. Her ISB did seem to set up normally with weakness in abduction and some motor sparing in her ulnar nerve distribution. Patient required minimal analgesics into the post operative period. Surgeon also spoke with family and all questions/concerns were addressed.    Post anesthesia nausea and vomiting:  controlled  Final Post Anesthesia Temperature Assessment:  Normothermia (36.0-37.5 degrees C)      INITIAL Post-op Vital signs:   Vitals Value Taken Time   /67 02/14/22 1906   Temp 36.8 °C (98.3 °F) 02/14/22 1906   Pulse 95 02/14/22 1906   Resp 17 02/14/22 1906   SpO2 99 % 02/14/22 1906

## 2022-02-15 NOTE — PROGRESS NOTES
Resting comfortably in bed, now on room air sats 98%. Given 1x norco for mild post-op pain per MAR. Hourly rounds completed, all needs met at this time. Bed lowered, locked, and call light within reach. Bedside shift report will be given to oncoming nurse.

## 2022-02-15 NOTE — PROGRESS NOTES
ACUTE PHYSICAL THERAPY GOALS:  (Developed with and agreed upon by patient and/or caregiver.)    (1.) Steve Escalera  will move from supine to sit and sit to supine  with INDEPENDENCE within 7 treatment day(s). (2.) Steve Escalera will transfer from bed to chair and chair to bed with INDEPENDENCE using the least restrictive device within 7 treatment day(s). (3.) Steve Escalera will ambulate with INDEPENDENCE for 250 feet with the least restrictive device within 7 treatment day(s). (4.) Steve Escalera will perform standing static and dynamic balance activities x 20 minutes with SUPERVISION to improve safety within 7 treatment day(s). (5.) Steve Escalera will ascend and descend 2 stairs using 0 hand rail(s) with INDEPENDENCE to improve functional mobility and safety within 7 treatment day(s). (6.) Steve Escalera will perform bilateral lower extremity exercises x 20 min for HEP with INDEPENDENCE to improve strength, endurance, and functional mobility within 7 treatment day(s).      PHYSICAL THERAPY ASSESSMENT: Initial Assessment, Daily Note and AM PT Treatment Day # 1      Steve Escalera is a 65 y.o. female   PRIMARY DIAGNOSIS: Osteoarthritis of left shoulder  Primary osteoarthritis of left shoulder [M19.012]  Localized osteoarthritis of left shoulder [M19.012]  Procedure(s) (LRB):  LEFT SHOULDER ARTHROPLASTY TOTAL REVERSE  CHOICE ANES (Left)  1 Day Post-Op  Reason for Referral:    ICD-10: Treatment Diagnosis: Pain in Left Shoulder (M25.512)  Other abnormalities of gait and mobility (R26.89)  INPATIENT: Payor: [employer] / Plan: Luis Busby / Product Type: Managed Care Medicare /     ASSESSMENT:     REHAB RECOMMENDATIONS:   Recommendation to date pending progress:  Setting:   Outpatient Therapy  Equipment:    None     PRIOR LEVEL OF FUNCTION:  (Prior to Hospitalization) INITIAL/CURRENT LEVEL OF FUNCTION:  (Most Recently Demonstrated)   Bed Mobility:   Independent  Sit to Stand:   Independent  Transfers:   Independent  Gait/Mobility:   Independent Bed Mobility:   Not tested  Sit to Stand:  [facility] [employer] Assistance  Transfers:   Not tested  Gait/Mobility:   Standby Assistance     ASSESSMENT:  Ms. Mansoor Wagner  is a 65year old F who presents s/p L reverse total shoulder, POD 1. She did have a surgical complication of a pneumothorax, is now on 5 L O2 (none at baseline). Per Dr. Ban Bennett pt is allowed full ROM of L shoulder except for internal rotation. At baseline, pt is independent with mobility. Today she was able to ambulate 40 ft within room with SBA and no AD. Decreased gait speed but overall tolerate well. Pt with shoulder pain throughout session but denied feeling SOB. Pt presents as functioning below her baseline, with deficits in mobility including transfers, gait, balance, and activity tolerance. Pt will benefit from skilled therapy services to address stated deficits to promote return to highest level of function, independence, and safety. Will continue to follow.      SUBJECTIVE:   Ms. Mansoor Wagner states, \"I need a better sling\"    SOCIAL HISTORY/LIVING ENVIRONMENT: PLOF: ind, lives with spouse, 2 stories but can stay on main level, 2 PALOMO, no falls or DME     OBJECTIVE:     PAIN: VITAL SIGNS: LINES/DRAINS:   Pre Treatment: Pain Screen  Pain Scale 1: Numeric (0 - 10)  Pain Intensity 1: 8  Pain Onset 1: postop  Pain Location 1: Shoulder  Pain Orientation 1: Left  Pain Intervention(s) 1: Ambulation/Increased Activity  Post Treatment: 8   IV  O2 Device: None (Room air)     GROSS EVALUATION:  B LE Within Functional Limits Abnormal/ Functional Abnormal/ Non-Functional (see comments) Not Tested Comments:   AROM [x] [] [] [] L UE limitations   PROM [] [] [] [x]    Strength [] [x] [] [] L UE limitations   Balance [] [x] [] [] Good sitting, fair standing    Posture [x] [] [] []    Sensation [] [] [] [x]    Coordination [] [] [] [x]    Tone [] [] [] [x]    Edema [] [] [] []    Activity Tolerance [] [x] [] [] Below baseline     [] [] [] []      COGNITION/  PERCEPTION: Intact Impaired   (see comments) Comments:   Orientation [x] []    Vision [x] []    Hearing [x] []    Command Following [x] []    Safety Awareness [x] []     [] []      MOBILITY: I Mod I S SBA CGA Min Mod Max Total  NT x2 Comments:   Bed Mobility    Rolling [] [] [] [] [] [] [] [] [] [x] [] Pt in chair on arrival   Supine to Sit [] [] [] [] [] [] [] [] [] [x] []    Scooting [] [] [] [] [] [] [] [] [] [x] []    Sit to Supine [] [] [] [] [] [] [] [] [] [x] []    Transfers    Sit to Stand [] [] [] [x] [] [] [] [] [] [] []    Bed to Chair [] [] [] [x] [] [] [] [] [] [] []    Stand to Sit [] [] [] [x] [] [] [] [] [] [] []    I=Independent, Mod I=Modified Independent, S=Supervision, SBA=Standby Assistance, CGA=Contact Guard Assistance,   Min=Minimal Assistance, Mod=Moderate Assistance, Max=Maximal Assistance, Total=Total Assistance, NT=Not Tested  GAIT: I Mod I S SBA CGA Min Mod Max Total  NT x2 Comments:   Level of Assistance [] [] [] [x] [] [] [] [] [] [] []    Distance 40 ft    DME None    Gait Quality Decreased gait speed and endurance    Weightbearing Status N/A     I=Independent, Mod I=Modified Independent, S=Supervision, SBA=Standby Assistance, CGA=Contact Guard Assistance,   Min=Minimal Assistance, Mod=Moderate Assistance, Max=Maximal Assistance, Total=Total Assistance, NT=Not Tested    [address] [facility] [address] [address] [facility] [address] [facility] Mobility Inpatient Short Form       How much difficulty does the patient currently have. .. Unable A Lot A Little None   1. Turning over in bed (including adjusting bedclothes, sheets and blankets)? [] 1   [] 2   [x] 3   [] 4   2. Sitting down on and standing up from a chair with arms ( e.g., wheelchair, bedside commode, etc.)   [] 1   [] 2   [x] 3   [] 4   3. Moving from lying on back to sitting on the side of the bed?    [] 1   [] 2   [x] 3   [] 4   How much help from another person does the patient currently need. .. Total A Lot A Little None   4. Moving to and from a bed to a chair (including a wheelchair)? [] 1   [] 2   [x] 3   [] 4   5. Need to walk in hospital room? [] 1   [] 2   [x] 3   [] 4   6. Climbing 3-5 steps with a railing? [] 1   [] 2   [x] 3   [] 4   © , Trustees of [address] [place] [address] [address] [address], under license to [employer]. All rights reserved     Score:  Initial: 18 Most Recent: X (Date: -- )    Interpretation of Tool:  Represents activities that are increasingly more difficult (i.e. Bed mobility, Transfers, Gait). PLAN:   FREQUENCY/DURATION: PT Plan of Care: Daily for duration of hospital stay or until stated goals are met, whichever comes first.    PROBLEM LIST:   (Skilled intervention is medically necessary to address:)  1. Decreased ADL/Functional Activities  2. Decreased Activity Tolerance  3. Decreased AROM/PROM  4. Decreased Balance  5. Decreased Gait Ability  6. Decreased Strength  7. Decreased Transfer Abilities  8. Increased Pain   INTERVENTIONS PLANNED:   (Benefits and precautions of physical therapy have been discussed with the patient.)  1. Therapeutic Activity  2. Therapeutic Exercise/HEP  3. Neuromuscular Re-education  4. Gait Training  5. Education     TREATMENT:     EVALUATION: Low Complexity : (Untimed Charge)    TREATMENT:   ($$ Gait Trainin-22 mins    )  Gait Training (12 Minutes): Gait training for 40 feet utilizing None. Patient required Tactile, Verbal and Visual cueing to improve Activity Pacing and Dynamic Standing Balance.      TREATMENT GRID:  N/A    AFTER TREATMENT POSITION/PRECAUTIONS:  Chair, Needs within reach, RN notified and Visitors at bedside    INTERDISCIPLINARY COLLABORATION:  MD/PA/NP and RN/PCT    TOTAL TREATMENT DURATION:  PT Patient Time In/Time Out  Time In: 1027  Time Out: [id number] [facility], PT

## 2022-02-15 NOTE — CONSULTS
Isabella Arboleda  Admission Date: 2/14/2022         Daily Progress Note: 2/15/2022    The patient's chart is reviewed and the patient is discussed with the staff. Background: 65year old admitted for an elective left arthroplasty. Post op xray showed a left pneumothorax. She is having some chest discomfort but is not short of breath. Her oyxgen saturation is normal on room air. Subjective:     S/P Ure-ambrose placement for Left PTX, lung re-expanded. CXR today with without  PTX.   No air leak     Current Facility-Administered Medications   Medication Dose Route Frequency    multivitamin, tx-iron-ca-min (THERA-M w/ IRON) tablet 1 Tablet  1 Tablet Oral DAILY    oxybutynin chloride XL (DITROPAN XL) tablet 5 mg  5 mg Oral DAILY    pantoprazole (PROTONIX) tablet 40 mg  40 mg Oral DAILY    PARoxetine (PAXIL) tablet 10 mg  10 mg Oral DAILY    0.9% sodium chloride infusion  75 mL/hr IntraVENous CONTINUOUS    sodium chloride (NS) flush 5-40 mL  5-40 mL IntraVENous Q8H    sodium chloride (NS) flush 5-40 mL  5-40 mL IntraVENous PRN    naloxone (NARCAN) injection 0.4 mg  0.4 mg IntraVENous Q10MIN PRN    HYDROcodone-acetaminophen (NORCO)  mg tablet 1 Tablet  1 Tablet Oral Q4H PRN    HYDROmorphone (DILAUDID) injection 1 mg  1 mg IntraVENous Q3H PRN    aspirin delayed-release tablet 81 mg  81 mg Oral BID    senna-docusate (PERICOLACE) 8.6-50 mg per tablet 1 Tablet  1 Tablet Oral BID PRN    LORazepam (ATIVAN) tablet 1 mg  1 mg Oral BID    ketorolac (TORADOL) injection 15 mg  15 mg IntraVENous Q6H PRN    benzocaine-menthol (CEPACOL) lozenge  1 Lozenge Oral Q2H PRN     Review of Systems  + left chest discomfort, no dyspnea  Constitutional: negative for fever, chills, sweats  Cardiovascular: negative for chest pain, palpitations, syncope, edema  Gastrointestinal:  negative for dysphagia, reflux, vomiting, diarrhea, abdominal pain, or melena  Neurologic:  negative for focal weakness, numbness, headache    Current Facility-Administered Medications   Medication Dose Route Frequency    multivitamin, tx-iron-ca-min (THERA-M w/ IRON) tablet 1 Tablet  1 Tablet Oral DAILY    oxybutynin chloride XL (DITROPAN XL) tablet 5 mg  5 mg Oral DAILY    pantoprazole (PROTONIX) tablet 40 mg  40 mg Oral DAILY    PARoxetine (PAXIL) tablet 10 mg  10 mg Oral DAILY    0.9% sodium chloride infusion  75 mL/hr IntraVENous CONTINUOUS    sodium chloride (NS) flush 5-40 mL  5-40 mL IntraVENous Q8H    sodium chloride (NS) flush 5-40 mL  5-40 mL IntraVENous PRN    naloxone (NARCAN) injection 0.4 mg  0.4 mg IntraVENous Q10MIN PRN    HYDROcodone-acetaminophen (NORCO)  mg tablet 1 Tablet  1 Tablet Oral Q4H PRN    HYDROmorphone (DILAUDID) injection 1 mg  1 mg IntraVENous Q3H PRN    aspirin delayed-release tablet 81 mg  81 mg Oral BID    senna-docusate (PERICOLACE) 8.6-50 mg per tablet 1 Tablet  1 Tablet Oral BID PRN    LORazepam (ATIVAN) tablet 1 mg  1 mg Oral BID    ketorolac (TORADOL) injection 15 mg  15 mg IntraVENous Q6H PRN    benzocaine-menthol (CEPACOL) lozenge  1 Lozenge Oral Q2H PRN         Objective:     Vitals:    02/14/22 1906 02/14/22 2304 02/15/22 0336 02/15/22 0634   BP: 116/67 132/68 (!) 113/59    Pulse: 95 (!) 105 91    Resp: 17 17 17    Temp: 98.3 °F (36.8 °C) 98.3 °F (36.8 °C) 98.2 °F (36.8 °C)    SpO2: 99% 94% 97% 98%   Weight:       Height:           Intake/Output Summary (Last 24 hours) at 2/15/2022 2521  Last data filed at 2/14/2022 1224  Gross per 24 hour   Intake 1220 ml   Output 550 ml   Net 670 ml     Physical Exam:   Constitution:  the patient is well developed and in no acute distress  HEENT:  Sclera clear, pupils equal, oral mucosa moist  Respiratory: clear bilaterally on 6 lpm   Cardiovascular:  RRR without M,G,R  Gastrointestinal: soft and non-tender; with positive bowel sounds. Musculoskeletal: warm without cyanosis. There is no lower extremity edema.   Skin:  no jaundice or rashes, left shoulder wound  Neurologic: no gross neuro deficits     Psychiatric:  alert and oriented, calm    CXR:       LAB: none    Assessment and Plan:  (Medical Decision Making)       Osteoarthritis of left shoulder (2/13/2022)   s/p surgery      Localized osteoarthritis of left shoulder (2/14/2022)   as above      Pneumothorax on left (2/14/2022)  Uriseal placed. Bronch lab notified. CXR without PTX. Cap uresil and repeat CXR In 3-4 hours. If no PTX, remove. More than 50% of the time documented was spent in face-to-face contact with the patient and in the care of the patient on the floor/unit where the patient is located. Luci Zapien NP     The patient has been seen and examined by me personally, the chart,labs, and radiographic studies have been reviewed. Chest: CTA- no air leak on URESIL  Extremities: no edema    I agree with the above assessment and plan. Remove URESIL after CXR clear.     Sofía Novoa MD.

## 2022-02-15 NOTE — PROGRESS NOTES
Discharge instructions reviewed. New small sling applied. Pt stated It was much better. Discharged to home.

## 2022-02-15 NOTE — PROGRESS NOTES
Follow up CXR shows tiny apical ptx with Uriseal clamped since this morning. Uriseal removed and occlusive dressing applied. Spoke with Dr. Anais Moreno and she can be discharged. Orders placed and instructions given.     Tulio Todd, NP

## 2022-02-15 NOTE — PROGRESS NOTES
CM met with patient / spouse to discuss d/c plan and needs. CM discussed PT/OT recommendation. Patient spouse not interested in outpatient therapy. Patient / spouse interested in [facility]. Spouse / patient has requested [facility]. Referral made to [facility]. Referral completed. Patient has d/c orders for today. Spouse will transport patient home. Patient has met all treatment goals / milestones. CM will continue to monitor. Care Management Interventions  PCP Verified by CM: Yes  Mode of Transport at Discharge:  Other (see comment)  Transition of Care Consult (CM Consult): Discharge [address] (request [facility])  [address]: Yes  Discharge Durable Medical Equipment: No  Physical Therapy Consult: Yes  Occupational Therapy Consult: Yes  Speech Therapy Consult: No  Support Systems: Spouse/Significant Other  Confirm Follow Up Transport: Family  The Patient and/or Patient Representative was Provided with a Choice of Provider and Agrees with the Discharge Plan?: Yes  Name of the Patient Representative Who was Provided with a Choice of Provider and Agrees with the Discharge Plan: patient / spouse  Freedom of Choice List was Provided with Basic Dialogue that Supports the Patient's Individualized Plan of Care/Goals, Treatment Preferences and Shares the Quality Data Associated with the Providers?: Yes  [place] Resource Information Provided?: No  Discharge Location  Patient Expects to be Discharged to[de-identified] Home with home health

## 2022-02-15 NOTE — PROGRESS NOTES
Resting comfortably in bed, remains on 5L NC. Given 1x norco for mild post-op pain per MAR. Hourly rounds completed, all needs met at this time. Bed lowered, locked, and call light within reach. Bedside shift report will be given to oncoming nurse.

## 2022-02-15 NOTE — PROGRESS NOTES
Problem: Falls - Risk of  Goal: *Absence of Falls  Description: Document [place] Fall Risk and appropriate interventions in the flowsheet.   Outcome: Progressing Towards Goal  Note: Fall Risk Interventions       Medication Interventions: Evaluate medications/consider consulting pharmacy,Patient to call before getting OOB,Teach patient to arise slowly      Problem: Patient Education: Go to Patient Education Activity  Goal: Patient/Family Education  Outcome: Progressing Towards Goal

## 2022-02-16 ENCOUNTER — HOME HEALTH ADMISSION (OUTPATIENT)
Dept: HOME HEALTH SERVICES | Facility: HOME HEALTH | Age: 77
End: 2022-02-16
Payer: MEDICARE

## 2022-02-18 ENCOUNTER — HOME CARE VISIT (OUTPATIENT)
Dept: SCHEDULING | Facility: HOME HEALTH | Age: 77
End: 2022-02-18
Payer: MEDICARE

## 2022-02-18 VITALS
HEART RATE: 60 BPM | OXYGEN SATURATION: 93 % | RESPIRATION RATE: 16 BRPM | TEMPERATURE: 97.5 F | DIASTOLIC BLOOD PRESSURE: 70 MMHG | SYSTOLIC BLOOD PRESSURE: 110 MMHG

## 2022-02-18 PROCEDURE — 400013 HH SOC

## 2022-02-18 PROCEDURE — G0151 HHCP-SERV OF PT,EA 15 MIN: HCPCS

## 2022-02-20 NOTE — PROGRESS NOTES
Physician Progress Note      PATIENT:               Veronica Hong  CSN #:                  [id number]  :                       1945  ADMIT DATE:       2022 5:08 AM  Kevin Valles [place] DATE:        2/15/2022 6:00 PM  RESPONDING  PROVIDER #:        Gerardo Guajardo NP          QUERY TEXT:    Pt admitted s/p left shoulder arthroplasty. Pt noted to have left pneumothorax postoperative. If possible, please document in progress notes and discharge summary:      The medical record reflects the following:  Risk Factors: 65 yr, s/p left shoulder arthroplasty    Clinical Indicators: per documentation on the OP note Please note that in the subsequent films we obtained postoperatively and we were called by Anesthesia, there appeared to be a medium-sized pneumothorax on the left side. Treatment: pulmonary consult,  Uresil thoracostomy device,  Options provided:  -- left pneumothorax is a postoperative complication  -- left pneumothorax is an expected/inherent condition that occurred postoperatively and not a complication  -- left pneumothorax is not a postoperative complication, but is due to other incidental risk factor, Please specify other incidental risk factor  -- Other - I will add my own diagnosis  -- Disagree - Not applicable / Not valid  -- Disagree - Clinically unable to determine / Unknown  -- Refer to Clinical Documentation Reviewer    PROVIDER RESPONSE TEXT:    Patient has left pneumothorax as a postoperative complication.     Query created by: Ellen Lin on 2/15/2022 10:37 AM      Electronically signed by:  Gerardo Guajardo NP 2022 5:14 AM

## 2022-02-21 ENCOUNTER — HOME CARE VISIT (OUTPATIENT)
Dept: SCHEDULING | Facility: HOME HEALTH | Age: 77
End: 2022-02-21
Payer: MEDICARE

## 2022-02-21 PROCEDURE — G0151 HHCP-SERV OF PT,EA 15 MIN: HCPCS

## 2022-02-22 ENCOUNTER — HOME CARE VISIT (OUTPATIENT)
Dept: SCHEDULING | Facility: HOME HEALTH | Age: 77
End: 2022-02-22
Payer: MEDICARE

## 2022-02-22 VITALS
SYSTOLIC BLOOD PRESSURE: 128 MMHG | TEMPERATURE: 98.2 F | RESPIRATION RATE: 19 BRPM | HEART RATE: 81 BPM | OXYGEN SATURATION: 99 % | DIASTOLIC BLOOD PRESSURE: 70 MMHG

## 2022-02-22 PROCEDURE — G0152 HHCP-SERV OF OT,EA 15 MIN: HCPCS

## 2022-02-23 ENCOUNTER — HOME CARE VISIT (OUTPATIENT)
Dept: SCHEDULING | Facility: HOME HEALTH | Age: 77
End: 2022-02-23
Payer: MEDICARE

## 2022-02-23 VITALS
OXYGEN SATURATION: 98 % | RESPIRATION RATE: 18 BRPM | HEART RATE: 77 BPM | DIASTOLIC BLOOD PRESSURE: 74 MMHG | SYSTOLIC BLOOD PRESSURE: 126 MMHG | TEMPERATURE: 97.1 F

## 2022-02-23 PROCEDURE — G0157 HHC PT ASSISTANT EA 15: HCPCS

## 2022-02-24 VITALS
HEART RATE: 84 BPM | OXYGEN SATURATION: 99 % | TEMPERATURE: 97.1 F | RESPIRATION RATE: 18 BRPM | SYSTOLIC BLOOD PRESSURE: 110 MMHG | DIASTOLIC BLOOD PRESSURE: 60 MMHG

## 2022-02-25 ENCOUNTER — HOME CARE VISIT (OUTPATIENT)
Dept: SCHEDULING | Facility: HOME HEALTH | Age: 77
End: 2022-02-25
Payer: MEDICARE

## 2022-02-25 VITALS
RESPIRATION RATE: 16 BRPM | HEART RATE: 71 BPM | SYSTOLIC BLOOD PRESSURE: 130 MMHG | DIASTOLIC BLOOD PRESSURE: 78 MMHG | OXYGEN SATURATION: 99 % | TEMPERATURE: 98.6 F

## 2022-02-25 PROCEDURE — G0157 HHC PT ASSISTANT EA 15: HCPCS

## 2022-03-01 ENCOUNTER — HOME CARE VISIT (OUTPATIENT)
Dept: SCHEDULING | Facility: HOME HEALTH | Age: 77
End: 2022-03-01
Payer: MEDICARE

## 2022-03-01 VITALS
OXYGEN SATURATION: 99 % | TEMPERATURE: 97.5 F | HEART RATE: 70 BPM | DIASTOLIC BLOOD PRESSURE: 60 MMHG | SYSTOLIC BLOOD PRESSURE: 110 MMHG | RESPIRATION RATE: 18 BRPM

## 2022-03-01 PROCEDURE — G0157 HHC PT ASSISTANT EA 15: HCPCS

## 2022-03-02 ENCOUNTER — HOME CARE VISIT (OUTPATIENT)
Dept: SCHEDULING | Facility: HOME HEALTH | Age: 77
End: 2022-03-02
Payer: MEDICARE

## 2022-03-02 PROCEDURE — G0151 HHCP-SERV OF PT,EA 15 MIN: HCPCS

## 2022-03-03 VITALS
TEMPERATURE: 97.3 F | RESPIRATION RATE: 18 BRPM | HEART RATE: 77 BPM | SYSTOLIC BLOOD PRESSURE: 124 MMHG | DIASTOLIC BLOOD PRESSURE: 76 MMHG | OXYGEN SATURATION: 98 %

## 2022-03-07 ENCOUNTER — HOSPITAL ENCOUNTER (OUTPATIENT)
Dept: PHYSICAL THERAPY | Age: 77
Discharge: HOME | End: 2022-03-07
Payer: MEDICARE

## 2022-03-07 PROCEDURE — 97161 PT EVAL LOW COMPLEX 20 MIN: CPT

## 2022-03-07 PROCEDURE — 97140 MANUAL THERAPY 1/> REGIONS: CPT

## 2022-03-07 PROCEDURE — 97110 THERAPEUTIC EXERCISES: CPT

## 2022-03-07 NOTE — THERAPY EVALUATION
Vamsi Lee  : 1945  Primary: Annie Owens Medicare Advantage  Secondary:  [address] at [facility]  [address] [place], [address] [place] [address], Bhavesh gutierrez, [address] [place] [address]  [phone]   [phone]       OUTPATIENT PHYSICAL THERAPY:Initial Assessment 3/7/2022    ICD-10: Treatment Diagnosis: Pain in Left Shoulder (M25.512)  Generalized Muscle Weakness (M62.81)  History of left shoulder replacement (H60.844)              Precautions: Incontinence, OSTEOPOROSIS, Cervical Fusion , Lumbar Fusion   Allergies: Patient has no known allergies. TREATMENT PLAN:  Effective Dates: 3/7/2022 TO 2022 (60 days). Frequency/Duration: 2-3 times a week for 60 Day(s) MEDICAL/REFERRING DIAGNOSIS:  History of left shoulder replacement [Z96.612]   DATE OF ONSET: Left Reverse Total Shoulder Arthroplasty 2022  REFERRING PHYSICIAN: Patricia Garnett MD MD Orders: Evaluate and Treat   Return MD Appointment: 3/22/2022     INITIAL ASSESSMENT:  Ms. Lee is a 65 y.o. female presenting to physical therapy with complaints of severe left shoulder pain and swelling status post left reverse shoulder total shoulder arthroplasty on 2022. Patient reports that during surgery she suffered a lung puncture, but has not really had any difficulty. She does reports having periods since surgery of intense pain. She reports having one episode last week after a home health physical therapy visit and yesterday after reaching to scratch her right arm. She reports difficulty sleeping at various points, reports at times having to sleep in her recliner and other times being able to sleep in her bed. She reports last year she started having trouble with her neck, shoulder and arm. She reports getting diagnosed with pinched nerve in her neck and per patient it was determined that she needed to have neck surgery.  She reports having a C3-4 fusion, 2021, that per patient helped with numbness and tingling her arm and some of her pain, but she continued to have left shoulder pain. She reports that she has a history of bilateral rotator cuff tears. She reports that she is left handed and is extremely eager to return back to full independence and all normal daily activities. Patient presents with increased pain, decreased strength, decreased ROM, decreased flexibility, impaired gait, impaired posture, impaired overhead reach, impaired transfer ability, decreased activity tolerance, and overall impaired functional mobility. Patient is a good candidate for skilled physical therapy interventions to include manual therapy, therapeutic exercise, balance training, gait training, transfer training, postural re-education, body mechanics training, and pain modalities and trigger point dry needling (TPDN) as needed. PROBLEM LIST (Impacting functional limitations):  1. Decreased Strength  2. Decreased ADL/Functional Activities  3. Decreased Transfer Abilities  4. Decreased Ambulation Ability/Technique  5. Increased Pain  6. Decreased Activity Tolerance  7. Decreased Pacing Skills  8. Decreased Work Simplification/Energy Conservation Techniques  9. Increased Fatigue  10. Decreased Flexibility/Joint Mobility  11. Edema/Girth  12. Decreased Knowledge of Precautions  13. Decreased [place] with Home Exercise Program INTERVENTIONS PLANNED: (Treatment may consist of any combination of the following)  1. Bed Mobility  2. Cold  3. Cryotherapy  4. Electrical Stimulation  5. Family Education  6. Gait Training  7. Heat  8. Home Exercise Program (HEP)  9. Manual Therapy  10. Neuromuscular Re-education/Strengthening  11. Range of Motion (ROM)  12. Therapeutic Activites  13. Therapeutic Exercise/Strengthening  14. Transcutaneous Electrical Nerve Stimulation (TENS)  15. Transfer Training  16. Ultrasound (US)  17.  Trigger Point Dry Needling (TPDN)     GOALS: (Goals have been discussed and agreed upon with patient.)  Short-Term Functional Goals: Time Frame: 3/7/2022 to 4/6/2022  1. Patient demonstrates independence with home exercise program without verbal cueing provided by therapist.   2. Patient will report no more than 2/10 left shoulder pain at rest in order to demonstrate improved self pain control and tolerance and improve quality of sleep. 3. Patient will be educated in and demonstrate improved upright posture to assist with return to full independence with dressing and bathing. 4. Patient will improve shoulder flexion to 115 degrees to assist with doing her hair without limitations independently. 5. Patient will improve shoulder external rotation to 45 degrees to assist with toileting. Discharge Goals: Time Frame: 3/7/2022 to 5/6/2022  1. Patient will improve shoulder flexion to 120 degrees to assist with reaching overhead. 2. Patient will improve shoulder strength to 4/5 to assist with lifting and carrying around home and to be able to go shopping. 3. Patient will improve shoulder IR to 45 degrees to assist reaching behind her back. 4. Patient will improve Disability of the Arm, Shoulder, and Hand score to 27/55 from 47/55. Outcome Measure: Tool Used: Disabilities of the Arm, Shoulder and Hand (DASH) Questionnaire - Quick Version  Score:  Initial: 47/55  Most Recent: X/55 (Date: -- )   Interpretation of Score: The DASH is designed to measure the activities of daily living in person's with upper extremity dysfunction or pain. Each section is scored on a 1-5 scale, 5 representing the greatest disability. The scores of each section are added together for a total score of 55.         Medical Necessity:   · Patient is expected to demonstrate progress in strength, range of motion, coordination and functional technique to decrease pain with and improve function with dressing, bathing, cooking, cleaning, housework, yardwork, driving, lifting, carrying, reaching overhead/behind head/behind back, and all home and community activities. · Skilled intervention continues to be required due to increased pain, swelling, decreased ROM, mobility, flexibility, strenght, and function. Reason for Services/Other Comments:  · Patient continues to require skilled intervention due to limited functional home and community abilities and increasing complexity of exercises. Total Evaluation Duration: 20 minutes    Rehabilitation Potential For Stated Goals: Good  Regarding Claudia Arboleda's therapy, I certify that the treatment plan above will be carried out by a therapist or under their direction. Thank you for this referral,  Sadie Barthel, PT     Referring Physician Signature: Cammy Clemente MD _______________________________ Date _____________             PAIN/SUBJECTIVE:    Initial: Pain Intensity 1: 10  Pain Location 1: Shoulder,Neck,Arm  Pain Orientation 1: Left  Post Session:  6/10    HISTORY:    History of Injury/Illness (Reason for Referral):  Ms. Latasha Flores is a 65 y.o. female presenting to physical therapy with complaints of severe left shoulder pain and swelling status post left reverse shoulder total shoulder arthroplasty on 2/14/2022. Patient reports that during surgery she suffered a lung puncture, but has not really had any difficulty. She does reports having periods since surgery of intense pain. She reports having one episode last week after a home health physical therapy visit and yesterday after reaching to scratch her right arm. She reports difficulty sleeping at various points, reports at times having to sleep in her recliner and other times being able to sleep in her bed. She reports last year she started having trouble with her neck, shoulder and arm. She reports getting diagnosed with pinched nerve in her neck and per patient it was determined that she needed to have neck surgery.  She reports having a C3-4 fusion, 8/25/2021, that per patient helped with numbness and tingling her arm and some of her pain, but she continued to have left shoulder pain. She reports that she has a history of bilateral rotator cuff tears. She reports that she is left handed and is extremely eager to return back to full independence and all normal daily activities. Past Medical History/Comorbidities:   Ms. Russ Trivedi  has a past medical history of Anxiety, Arthritis, Chronic pain, COVID-19 vaccine series completed (02/2021), Former smoker (quit 1985), GERD (gastroesophageal reflux disease), Hormone replacement therapy (postmenopausal), and Nausea & vomiting (08/25/2021). Ms. Russ Trivedi  has a past surgical history that includes hx hysterectomy (1992); hx bladder repair (2006); hx hammer toe repair (Right); hx back surgery (2013); hx lumbar laminectomy (1998); and hx cervical fusion (08/25/2021). Social History/Living Environment:     Patient lives with her . Patient is eager to return to full independence with all daily activities, including driving. Prior Level of Function/Work/Activity:  Patient was independent without dysfunction. Patient is retired. Patient is eager to return to all ADLs, housework, cooking, cleaning, driving, shopping. Dominant Side:         LEFT    Ambulatory/Rehab Services H2 Model Falls Risk Assessment    Risk Factors:       No Risk Factors Identified Ability to Rise from Chair:       (1)  Pushes up, successful in one attempt    Falls Prevention Plan:       No modifications necessary    Total: (5 or greater = High Risk): 1    ©2010 [facility] [employer]. All Rights Reserved. [facility] Patent #[id number]. Federal Law prohibits the replication, distribution or use without written permission from [facility] [employer]    Current Medications:        Current Outpatient Medications:     methylcellulose, with sugar, (CITRUCEL FIBER LAXATIVE PO), 1 scoop mix with 6-8 oz of fluid by mouth daily as needed for constipation, Disp: , Rfl:     aspirin delayed-release 81 mg tablet, Take 1 Tablet by mouth two (2) times a day. , Disp: 1 Tablet, Rfl: 0    multivitamin (ONE A DAY) tablet, Take 1 Tablet by mouth daily. , Disp: , Rfl:     alendronate (FOSAMAX) 70 mg tablet, Take 70 mg by mouth every seven (7) days. Every Thursday, Disp: , Rfl:     meloxicam (MOBIC) 7.5 mg tablet, Take 7.5 mg by mouth daily as needed for Pain., Disp: , Rfl:     oxybutynin chloride XL (DITROPAN XL) 5 mg CR tablet, Take 5 mg by mouth daily. , Disp: , Rfl:     pantoprazole (PROTONIX) 40 mg tablet, Take 40 mg by mouth daily. , Disp: , Rfl:     PARoxetine (PAXIL) 10 mg tablet, Take 10 mg by mouth every morning. Indications: ANXIETY WITH DEPRESSION, Disp: , Rfl:     Date Last Reviewed:  3/7/2022    Number of Personal Factors/Comorbidities that affect the Plan of Care:   1-2: MODERATE COMPLEXITY    EXAMINATION:    Patient denies any headaches, changes in vision, dizziness, vertigo, nausea, drop attacks, black outs, tinnitus, dysphagia, dysarthria, LE symptoms or bowel/bladder dysfunction. Observation/Orthostatic Postural Assessment:          Patient presents with forward head, rounded shoulders, protracted shoulders. Patient demonstrated increased left scapular elevation. Patient presented with guarding left UE posture. Patient demonstrated little to no left arm swing. Palpation:          Patient presented with severe bilateral Cervical/Thoracic Paraspinal, Suboccipital, Scalene, left Upper Trapezius, Levator Scapula, Parascapular, Posterior Cuff, Deltoid, Biceps, Incision tenderness, tightness, trigger points. ROM:          NT = Not Tested  AROM/ PROM Left (degrees) Right (degrees)   Shoulder Flexion Passive 89 degrees 152   Shoulder Abduction    Shoulder Internal Rotation  NT 60   Functional Internal Rotation NT T2   Shoulder External Rotation Passive 30 degrees 80   Functional External Rotation NT T10    Cervical AROM: Flexion 50 degrees, Extension 40 degrees, Side Bending Right 10 degrees, Left 30 degrees, Rotation Right 40 degrees Left 70 degrees. Strength: Motion Tested Left   (*/5) Right  (*/5)   Shoulder Flexion 2 3   Scaption 2 3   Shoulder Abduction 2 3   Shoulder Internal Rotation  2 3   Shoulder External Rotation 2 3   Shoulder Internal Rotation   (90 abduction) 2 3   Shoulder External Rotation   (90 abduction) 2 3   Elbow Flexion 3 4   Elbow Extension 3 4   Wrist Flexion 3 4   Wrist Extension 3 4     Special Tests:  Patient demonstrated well heal incision. Patient denied any signs or symptoms of deep vein thrombosis or infection. Passive Accessory Motion:         Patient demonstrated limited scapular mobility bilaterally. Neurological Screen:              Myotomes: Key muscle strength testing through bilateral UE is intact, deficits noted above. Dermatomes: Sensation to light touch for bilateral UE is intact from C5 to T1. Reflexes:        Biceps (C5): Intact and Equal        Brachioradialis (C6): Intact and Equal        Triceps (C7): Intact and Equal    Functional Mobility:         Gait/Ambulation:  Patient demonstrated no left UE arm swing, kept arm against body. Patient demonstrated antalgic gait pattern. Transfers:  Patient required minimal assistance for all transfers. Balance:          Patient denied any falls or trouble with her balance. Body Structures Involved:  1. Bones  2. Joints  3. Muscles  4. Ligaments Body Functions Affected:  1. Sensory/Pain  2. Neuromusculoskeletal  3. Movement Related Activities and Participation Affected:  1. General Tasks and Demands  2. Mobility  3. Self Care  4. Domestic Life  5. Interpersonal Interactions and Relationships  6.  Community, Social and [place]    Number of elements (examined above) that affect the Plan of Care: 3: MODERATE COMPLEXITY    CLINICAL PRESENTATION:    Presentation:   Stable and uncomplicated: LOW COMPLEXITY    CLINICAL DECISION MAKING:    Use of outcome tool(s) and clinical judgement create a POC that gives a: Questionable prediction of patient's progress: MODERATE COMPLEXITY

## 2022-03-07 NOTE — PROGRESS NOTES
Theone Yazmin Lee  : 1945  Primary: Martinez Griffin Medicare Advantage  Secondary:  [address] at 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 TREATMENT PLAN:  Effective Dates: 3/7/2022 TO 2022 (60 days). Frequency/Duration: 2-3 times a week for 60 Day(s) MEDICAL/REFERRING DIAGNOSIS:  History of left shoulder replacement [Z96.612]   DATE OF ONSET: Left Reverse Total Shoulder Arthroplasty 2022  REFERRING PHYSICIAN: Enma Rivera MD MD Orders: Evaluate and Treat   Return MD Appointment: 3/22/2022     Pre-treatment Symptoms/Complaints:  Patient reports that she reach across her body to her right to scratch her right arm and this caused severe pain in her left shoulder and neck. She reports that she saw MD this past Friday and per patient and her  she was instructed to come out her sling, only wearing it as needed or if she went walking for exercise. Pain: Initial: Pain Intensity 1: 10  Pain Location 1: Shoulder,Neck,Arm  Pain Orientation 1: Left  Post Session:  6/10   Medications Last Reviewed:  3/7/2022  Updated Objective Findings:  See evaluation note from today   TREATMENT:   THERAPEUTIC EXERCISE: (14 minutes):  Exercises per grid below to improve mobility, strength and coordination. Required moderate visual, verbal, manual and tactile cues to promote proper body alignment, promote proper body posture, promote proper body mechanics and promote proper body breathing techniques.   Progressed resistance, range, repetitions and complexity of movement as indicated. Date:  3/7/2022 Date:   Date:     Activity/Exercise Parameters Parameters Parameters   Cervical ROM Flexion and Extension 3 x 10   Rotation 3 x 10     Scapular Retraction Seated 3 x 10     PROM Flexion 4 minutes  ER 4 minutes                               Time spent with patient reviewing proper muscle recruitment and technique with exercises. Progression, Guidelines    MANUAL THERAPY: (24 minutes): Joint mobilization and Soft tissue mobilization was utilized and necessary because of the patient's restricted joint motion, painful spasm, loss of articular motion and restricted motion of soft tissue   Soft Tissue Mobilization Left Upper Trap, Levator Scapula, Infraspinatus    MODALITIES: (0 minutes):      None Today     HEP: As above; handouts given to patient for all exercises. Treatment/Session Summary:    · Response to Treatment:  Patient demonstrated severe left upper trapezius and levator scapula tightness, tenderness, trigger points. Patient also demonstrated severe postural deficits. Patient reported significant symptom relief with treatment. She was instructed regarding an initial home exercise program.  · Communication/Consultation:  HEP, Joint Protection  · Equipment provided today:  None today  · Recommendations/Intent for next treatment session: Next visit will focus on mobility, flexibility, ROM, strength, muscle activation, per MD guidelines. Total Treatment Billable Duration:  58 minutes: 20 evaluation, 24 minutes manual therapy, 14 minutes therapeutic exercise.   PT Patient Time In/Time Out  Time In: 0900  Time Out: [place] [id number], [address], PT

## 2022-03-11 ENCOUNTER — HOSPITAL ENCOUNTER (OUTPATIENT)
Dept: PHYSICAL THERAPY | Age: 77
Discharge: HOME | End: 2022-03-11
Payer: MEDICARE

## 2022-03-11 PROCEDURE — 97140 MANUAL THERAPY 1/> REGIONS: CPT

## 2022-03-11 PROCEDURE — 97110 THERAPEUTIC EXERCISES: CPT

## 2022-03-11 NOTE — PROGRESS NOTES
Gabriela Romeo [place]  : 1945  Primary: Vertell Dubin Medicare Advantage  Secondary:  [address] at [facility]  [address] [place], [address] [place] [address], [place], [address] [place] [address]  [phone]   [phone]      OUTPATIENT PHYSICAL THERAPY: Daily Treatment Note 3/11/2022    ICD-10: Treatment Diagnosis: Pain in Left Shoulder (M25.512)  Generalized Muscle Weakness (M62.81)  History of left shoulder replacement (E02.824)              Precautions: Incontinence, OSTEOPOROSIS, Cervical Fusion , Lumbar Fusion   Allergies: Patient has no known allergies. TREATMENT PLAN:  Effective Dates: 3/7/2022 TO 2022 (60 days). Frequency/Duration: 2-3 times a week for 60 Day(s) MEDICAL/REFERRING DIAGNOSIS:  History of left shoulder replacement [Z96.612]   DATE OF ONSET: Left Reverse Total Shoulder Arthroplasty 2022  REFERRING PHYSICIAN: Oswaldo Chandra MD MD Orders: Evaluate and Treat   Return MD Appointment: 3/22/2022     Pre-treatment Symptoms/Complaints:  Patient reports that she is feeling a whole lot better. She reports less pain after first session. She reports that she is working on her HEP. Pain: Initial: Pain Intensity 1: 3  Pain Location 1: Shoulder  Pain Orientation 1: Left  Post Session:  1/10   Medications Last Reviewed:  3/11/2022  Updated Objective Findings:  Palpation: Left Upper Trapezius, Levator Scapula Mild to Moderate Trigger Points, Tightness, and Tenderness. TREATMENT:   THERAPEUTIC EXERCISE: (38 minutes):  Exercises per grid below to improve mobility, strength and coordination. Required moderate visual, verbal, manual and tactile cues to promote proper body alignment, promote proper body posture, promote proper body mechanics and promote proper body breathing techniques. Progressed resistance, range, repetitions and complexity of movement as indicated.      Date:  3/7/2022 Date:  3/11/2022 Date:     Activity/Exercise Parameters Parameters Parameters   Cervical ROM Flexion and Extension 3 x 10   Rotation 3 x 10     Scapular Retraction Seated 3 x 10 Standing Back Against Wall and Foam Roller 3 x 15    PROM Flexion 4 minutes  ER 4 minutes Flexion 12 minutes    ER 8 minutes    Scapular Clock  S/L x 10    Bicep Curl  Standing Back Against Wall and Foam Roller 3 x 15    Isometrics  10 x 10 sec Each IR, ER, Abduction     Rhythmic Stabilization   3 x 30 Supine IR/ER Arm Supported      Time spent with patient reviewing proper muscle recruitment and technique with exercises. Progression, Guidelines    MANUAL THERAPY: (15 minutes): Joint mobilization and Soft tissue mobilization was utilized and necessary because of the patient's restricted joint motion, painful spasm, loss of articular motion and restricted motion of soft tissue   Soft Tissue Mobilization Left Upper Trap, Levator Scapula, Infraspinatus    MODALITIES: (7 minutes):      *  Cold Pack Therapy in order to provide analgesia, relieve muscle spasm and reduce inflammation and edema. (No Charge)     HEP: As above; handouts given to patient for all exercises. Treatment/Session Summary:    · Response to Treatment:  Patient reported symptom relief with treatment. Patient demonstrated improved overall movement of her left shoulder and arm. Patient required verbal cues for posture and form. She would benefit from continued progression per MD guidelines. · Communication/Consultation:  HEP, Joint Protection  · Equipment provided today:  None today  · Recommendations/Intent for next treatment session: Next visit will focus on mobility, flexibility, ROM, strength, muscle activation, per MD guidelines.     Total Treatment Billable Duration:  53 minutes  PT Patient Time In/Time Out  Time In: 1100  Time Out: [address] [facility], PT

## 2022-03-15 ENCOUNTER — HOSPITAL ENCOUNTER (OUTPATIENT)
Dept: PHYSICAL THERAPY | Age: 77
Discharge: HOME | End: 2022-03-15
Payer: MEDICARE

## 2022-03-15 PROCEDURE — 97110 THERAPEUTIC EXERCISES: CPT

## 2022-03-15 PROCEDURE — 97140 MANUAL THERAPY 1/> REGIONS: CPT

## 2022-03-15 NOTE — PROGRESS NOTES
Cheyenne Lee  : 1945  Primary: Gonsalo Andrade Medicare Advantage  Secondary:  [address] at [facility]  [address] [place], [address] [place] [address], [address]  [phone]   [phone]      OUTPATIENT PHYSICAL THERAPY: Daily Treatment Note 3/15/2022    ICD-10: Treatment Diagnosis: Pain in Left Shoulder (M25.512)  Generalized Muscle Weakness (M62.81)  History of left shoulder replacement (I33.927)              Precautions: Incontinence, OSTEOPOROSIS, Cervical Fusion , Lumbar Fusion   Allergies: Patient has no known allergies. TREATMENT PLAN:  Effective Dates: 3/7/2022 TO 2022 (60 days). Frequency/Duration: 2-3 times a week for 60 Day(s) MEDICAL/REFERRING DIAGNOSIS:  History of left shoulder replacement [Z96.612]   DATE OF ONSET: Left Reverse Total Shoulder Arthroplasty 2022  REFERRING PHYSICIAN: Janita Nageotte, MD MD Orders: Evaluate and Treat   Return MD Appointment: 3/22/2022     Pre-treatment Symptoms/Complaints:  Patient reports that she is continuing to feel better, and continues to work on her HEP. Pain: Initial: Pain Intensity 1: 3  Pain Location 1: Shoulder  Pain Orientation 1: Left  Post Session:  1/10   Medications Last Reviewed:  3/15/2022  Updated Objective Findings:  Palpation: Left Upper Trapezius, Levator Scapula Mild to Moderate Trigger Points, Tightness, and Tenderness. TREATMENT:   THERAPEUTIC EXERCISE: (38 minutes):  Exercises per grid below to improve mobility, strength and coordination. Required moderate visual, verbal, manual and tactile cues to promote proper body alignment, promote proper body posture, promote proper body mechanics and promote proper body breathing techniques. Progressed resistance, range, repetitions and complexity of movement as indicated.      Date:  3/7/2022 Date:  3/11/2022 Date:  3/15/2022   Activity/Exercise Parameters Parameters Parameters   Cervical ROM Flexion and Extension 3 x 10   Rotation 3 x 10     Scapular Retraction Seated 3 x 10 Standing Back Against Wall and Foam Roller 3 x 15 Standing Back Against Wall and Foam Roller 3 x 15   PROM Flexion 4 minutes  ER 4 minutes Flexion 12 minutes    ER 8 minutes Flexion 12 minutes    ER 8 minutes   Scapular Clock  S/L x 10    Bicep Curl  Standing Back Against Wall and Foam Roller 3 x 15 Standing Back Against Wall and Foam Roller 3 x 15   Isometrics  10 x 10 sec Each IR, ER, Abduction     Rhythmic Stabilization   3 x 30 Supine IR/ER Arm Supported 3 x 30 Supine IR/ER Arm Supported   Shoulder ER   Supine Cane ER, Controlled, NO END Stretch 3 x 10   Shoulder Flexion   Supine Cane Flexion, Controlled, NO END Range Stretch 3 x 10    Seated Flexion with Swiss Ball, NO END Range Stretch, Working on Control 3 x 10           Time spent with patient reviewing proper muscle recruitment and technique with exercises. Progression, Guidelines    MANUAL THERAPY: (15 minutes): Joint mobilization and Soft tissue mobilization was utilized and necessary because of the patient's restricted joint motion, painful spasm, loss of articular motion and restricted motion of soft tissue   Soft Tissue Mobilization Left Upper Trap, Levator Scapula, Infraspinatus    MODALITIES: (0 minutes):      *  Cold Pack Therapy in order to provide analgesia, relieve muscle spasm and reduce inflammation and edema. (No Charge)     HEP: As above; handouts given to patient for all exercises. Treatment/Session Summary:    · Response to Treatment:  Patient reported continued symptom relief with treatment. Patient continues to demonstrate improved overall movement of her left shoulder and arm. Patient required verbal cues for posture and form. She requires significant cuing for correct posture and mechanics. She would benefit from continued progression per MD guidelines.   · Communication/Consultation:  HEP, Joint Protection  · Equipment provided today:  None today  · Recommendations/Intent for next treatment session: Next visit will focus on mobility, flexibility, ROM, strength, muscle activation, per MD guidelines.     Total Treatment Billable Duration:  53 minutes  PT Patient Time In/Time Out  Time In: 0900  Time Out: [facility] [address], [address] , PT

## 2022-03-17 ENCOUNTER — HOSPITAL ENCOUNTER (OUTPATIENT)
Dept: PHYSICAL THERAPY | Age: 77
Discharge: HOME | End: 2022-03-17
Payer: MEDICARE

## 2022-03-17 PROCEDURE — 97140 MANUAL THERAPY 1/> REGIONS: CPT

## 2022-03-17 PROCEDURE — 97110 THERAPEUTIC EXERCISES: CPT

## 2022-03-17 NOTE — PROGRESS NOTES
Janelle Cherry [place]  : 1945  Primary: Jf Juarez Medicare Advantage  Secondary:  [address] at [facility]  [address] [place], [address] [place] [address], [address] [place] [address]  [phone]   [phone]      OUTPATIENT PHYSICAL THERAPY: Daily Treatment Note 3/17/2022    ICD-10: Treatment Diagnosis: Pain in Left Shoulder (M25.512)  Generalized Muscle Weakness (M62.81)  History of left shoulder replacement (S45.202)              Precautions: Incontinence, OSTEOPOROSIS, Cervical Fusion , Lumbar Fusion   Allergies: Patient has no known allergies. TREATMENT PLAN:  Effective Dates: 3/7/2022 TO 2022 (60 days). Frequency/Duration: 2-3 times a week for 60 Day(s) MEDICAL/REFERRING DIAGNOSIS:  History of left shoulder replacement [Z96.612]   DATE OF ONSET: Left Reverse Total Shoulder Arthroplasty 2022  REFERRING PHYSICIAN: Michael Nance MD MD Orders: Evaluate and Treat   Return MD Appointment: 3/22/2022     Pre-treatment Symptoms/Complaints:  Patient reports that she is feeling really good after last session. Pain: Initial: Pain Intensity 1: 3  Pain Location 1: Shoulder  Pain Orientation 1: Left  Post Session:  1/10   Medications Last Reviewed:  3/17/2022  Updated Objective Findings:  Palpation: Left Upper Trapezius, Levator Scapula Mild to Moderate Trigger Points, Tightness, and Tenderness. TREATMENT:   THERAPEUTIC EXERCISE: (38 minutes):  Exercises per grid below to improve mobility, strength and coordination. Required moderate visual, verbal, manual and tactile cues to promote proper body alignment, promote proper body posture, promote proper body mechanics and promote proper body breathing techniques. Progressed resistance, range, repetitions and complexity of movement as indicated.      Date:  3/17/2022 Date:  3/11/2022 Date:  3/15/2022   Activity/Exercise Parameters Parameters Parameters   Cervical ROM      Scapular Retraction Standing Back Against Wall and Foam Roller 3 x 15    Seated Edge of Table 3 x 10 Standing Back Against Wall and Foam Roller 3 x 15 Standing Back Against Wall and Foam Roller 3 x 15   PROM Flexion 12 minutes    ER 8 minutes Flexion 12 minutes    ER 8 minutes Flexion 12 minutes    ER 8 minutes   Scapular Clock  S/L x 10    Bicep Curl Standing Back Against Wall and Foam Roller 3 x 15 Standing Back Against Wall and Foam Roller 3 x 15 Standing Back Against Wall and Foam Roller 3 x 15   Isometrics 3 x 15, 2 sec Hold Each IR, ER, Abduction 10 x 10 sec Each IR, ER, Abduction     Rhythmic Stabilization   3 x 30 Supine IR/ER Arm Supported 3 x 30 Supine IR/ER Arm Supported   Shoulder ER Supine Cane ER, Controlled, NO END Stretch 3 x 10  Supine Cane ER, Controlled, NO END Stretch 3 x 10   Shoulder Flexion Supine Cane Flexion, Controlled, NO END Range Stretch 3 x 10    Seated Flexion with Swiss Ball, NO END Range Stretch, Working on Control 3 x 10  Supine Cane Flexion, Controlled, NO END Range Stretch 3 x 10    Seated Flexion with Swiss Ball, NO END Range Stretch, Working on Control 3 x 10           Time spent with patient reviewing proper muscle recruitment and technique with exercises. Progression, Guidelines    MANUAL THERAPY: (15 minutes): Joint mobilization and Soft tissue mobilization was utilized and necessary because of the patient's restricted joint motion, painful spasm, loss of articular motion and restricted motion of soft tissue   Soft Tissue Mobilization Left Upper Trap, Levator Scapula, Infraspinatus    MODALITIES: (0 minutes):      *  Cold Pack Therapy in order to provide analgesia, relieve muscle spasm and reduce inflammation and edema. (No Charge)     HEP: As above; handouts given to patient for all exercises. Treatment/Session Summary:    · Response to Treatment:  Patient continued to have left levator scapula and upper trapezius tightness and trigger points, but overall this has improved significantly.  She continues to progress ROM according to guidelines. Patient has been educated multiple times regarding joint protection and to avoid any end range stress. Patient would benefit from continued progression per MD guidelines. · Communication/Consultation:  HEP, Joint Protection  · Equipment provided today:  None today  · Recommendations/Intent for next treatment session: Next visit will focus on mobility, flexibility, ROM, strength, muscle activation, per MD guidelines.     Total Treatment Billable Duration:  53 minutes  PT Patient Time In/Time Out  Time In: 1000  Time Out: [address], PT

## 2022-03-18 PROBLEM — M19.012 LOCALIZED OSTEOARTHRITIS OF LEFT SHOULDER: Status: ACTIVE | Noted: 2022-02-14

## 2022-03-19 PROBLEM — M48.02 CERVICAL SPINAL STENOSIS: Status: ACTIVE | Noted: 2021-08-25

## 2022-03-19 PROBLEM — M19.012 OSTEOARTHRITIS OF LEFT SHOULDER: Status: ACTIVE | Noted: 2022-02-13

## 2022-03-20 PROBLEM — J93.9 PNEUMOTHORAX ON LEFT: Status: ACTIVE | Noted: 2022-02-14

## 2022-03-24 ENCOUNTER — HOSPITAL ENCOUNTER (OUTPATIENT)
Dept: PHYSICAL THERAPY | Age: 77
Discharge: HOME | End: 2022-03-24
Payer: MEDICARE

## 2022-03-24 PROCEDURE — 97140 MANUAL THERAPY 1/> REGIONS: CPT

## 2022-03-24 PROCEDURE — 97110 THERAPEUTIC EXERCISES: CPT

## 2022-03-24 NOTE — PROGRESS NOTES
Brandon Barrett [place]  : 1945  Primary: Mary Mak Medicare Advantage  Secondary:  [address] at [facility]  [address] [place], [address] [place] [address], [place], [address] [place] [address]  [phone]   [phone]      OUTPATIENT PHYSICAL THERAPY: Daily Treatment Note 3/24/2022    ICD-10: Treatment Diagnosis: Pain in Left Shoulder (M25.512)  Generalized Muscle Weakness (M62.81)  History of left shoulder replacement (U14.815)              Precautions: Incontinence, OSTEOPOROSIS, Cervical Fusion , Lumbar Fusion   Allergies: Patient has no known allergies. TREATMENT PLAN:  Effective Dates: 3/7/2022 TO 2022 (60 days). Frequency/Duration: 2-3 times a week for 60 Day(s) MEDICAL/REFERRING DIAGNOSIS:  History of left shoulder replacement [Z96.612]   DATE OF ONSET: Left Reverse Total Shoulder Arthroplasty 2022  REFERRING PHYSICIAN: Alia Hatch MD MD Orders: Evaluate and Treat   Return MD Appointment: 3/22/2022     Pre-treatment Symptoms/Complaints:  Patient reports steady improvement. Pain: Initial: Pain Intensity 1: 3  Pain Location 1: Shoulder  Pain Orientation 1: Left  Post Session:  1/10   Medications Last Reviewed:  3/24/2022  Updated Objective Findings:  Palpation: Left Upper Trapezius, Levator Scapula Mild to Moderate Trigger Points, Tightness, and Tenderness. TREATMENT:   THERAPEUTIC EXERCISE: (38 minutes):  Exercises per grid below to improve mobility, strength and coordination. Required moderate visual, verbal, manual and tactile cues to promote proper body alignment, promote proper body posture, promote proper body mechanics and promote proper body breathing techniques. Progressed resistance, range, repetitions and complexity of movement as indicated.      Date:  3/17/2022 Date:  3/24/2022 Date:  3/15/2022   Activity/Exercise Parameters Parameters Parameters   Cervical ROM      Scapular Retraction Standing Back Against Wall and Foam Roller 3 x 15    Seated Edge of Table 3 x 10 Standing Back Against Wall and Foam Roller 3 x 15 Standing Back Against Wall and Foam Roller 3 x 15   PROM Flexion 12 minutes    ER 8 minutes Flexion 12 minutes    ER 8 minutes Flexion 12 minutes    ER 8 minutes   Scapular Clock      Bicep Curl Standing Back Against Wall and Foam Roller 3 x 15 Standing Back Against Wall and Foam Roller 3 x 15 Standing Back Against Wall and Foam Roller 3 x 15   Isometrics 3 x 15, 2 sec Hold Each IR, ER, Abduction 10 x 10 sec Each IR, ER, Abduction     Rhythmic Stabilization   3 x 30 Supine IR/ER Arm Supported 3 x 30 Supine IR/ER Arm Supported   Shoulder ER Supine Cane ER, Controlled, NO END Stretch 3 x 10 Supine cane  3 x 10 Supine Cane ER, Controlled, NO END Stretch 3 x 10   Shoulder Flexion Supine Cane Flexion, Controlled, NO END Range Stretch 3 x 10    Seated Flexion with Swiss Ball, NO END Range Stretch, Working on Control 3 x 10 Supine cane  3 x 10 Supine Cane Flexion, Controlled, NO END Range Stretch 3 x 10    Seated Flexion with Swiss Ball, NO END Range Stretch, Working on Control 3 x 10           Time spent with patient reviewing proper muscle recruitment and technique with exercises. Progression, Guidelines    MANUAL THERAPY: (15 minutes): Joint mobilization and Soft tissue mobilization was utilized and necessary because of the patient's restricted joint motion, painful spasm, loss of articular motion and restricted motion of soft tissue   Soft Tissue Mobilization Left Upper Trap, Levator Scapula, Infraspinatus    MODALITIES: (0 minutes):      *  Cold Pack Therapy in order to provide analgesia, relieve muscle spasm and reduce inflammation and edema. (No Charge)     HEP: As above; handouts given to patient for all exercises. Treatment/Session Summary:    · Response to Treatment:  Patient reports feeling much better after manual therapy. Decreased tightness noted.   · Communication/Consultation:  HEP, Joint Protection  · Equipment provided today:  None today  · Recommendations/Intent for next treatment session: Next visit will focus on mobility, flexibility, ROM, strength, muscle activation, per MD guidelines.     Total Treatment Billable Duration:  53 minutes  PT Patient Time In/Time Out  Time In: 0900  Time Out: [address] [place], PTA

## 2022-03-25 ENCOUNTER — HOSPITAL ENCOUNTER (OUTPATIENT)
Dept: PHYSICAL THERAPY | Age: 77
Discharge: HOME | End: 2022-03-25
Payer: MEDICARE

## 2022-03-25 PROCEDURE — 97140 MANUAL THERAPY 1/> REGIONS: CPT

## 2022-03-25 PROCEDURE — 97110 THERAPEUTIC EXERCISES: CPT

## 2022-03-25 NOTE — PROGRESS NOTES
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 TREATMENT PLAN:  Effective Dates: 3/7/2022 TO 2022 (60 days). Frequency/Duration: 2-3 times a week for 60 Day(s) MEDICAL/REFERRING DIAGNOSIS:  History of left shoulder replacement [Z96.612]   DATE OF ONSET: Left Reverse Total Shoulder Arthroplasty 2022  REFERRING PHYSICIAN: Eder Pope MD MD Orders: Evaluate and Treat   Return MD Appointment: 3/22/2022     Pre-treatment Symptoms/Complaints:  Patient reports shoulder pain is mor controlled at this time; per pt now wearing sling outdoors only per MD instruction. Pain: Initial: Pain Intensity 1: 3  Pain Location 1: Shoulder  Pain Orientation 1: Left  Post Session:  1/10   Medications Last Reviewed:  3/25/2022  Updated Objective Findings:  Palpation: Increaesed muscle tightness noted in upper trap region. TREATMENT:   THERAPEUTIC EXERCISE: (38 minutes):  Exercises per grid below to improve mobility, strength and coordination. Required moderate visual, verbal, manual and tactile cues to promote proper body alignment, promote proper body posture, promote proper body mechanics and promote proper body breathing techniques. Progressed resistance, range, repetitions and complexity of movement as indicated.      Date:  3/17/2022 Date:  3/24/2022 Date:  3/25/2022   Activity/Exercise Parameters Parameters Parameters   Cervical ROM      Scapular Retraction Standing Back Against Wall and Foam Roller 3 x 15    Seated Edge of Table 3 x 10 Standing Back Against Wall and Foam Roller 3 x 15 Standing Back Against Wall and Foam Roller 2 x 15   PROM Flexion 12 minutes    ER 8 minutes Flexion 12 minutes    ER 8 minutes Flexion 12 minutes    ER 8 minutes   Scapular Clock      Bicep Curl Standing Back Against Wall and Foam Roller 3 x 15 Standing Back Against Wall and Foam Roller 3 x 15 Standing Back Against Wall and Foam Roller 3 x 15   Isometrics 3 x 15, 2 sec Hold Each IR, ER, Abduction 10 x 10 sec Each IR, ER, Abduction  10 x 10 sec Each IR, ER, Abduction    Rhythmic Stabilization   3 x 30 Supine IR/ER Arm Supported 3 x 30 Supine IR/ER Arm Supported   Shoulder ER Supine Cane ER, Controlled, NO END Stretch 3 x 10 Supine cane  3 x 10 Supine Cane ER, Controlled, NO END Stretch 3 x 10   Shoulder Flexion Supine Cane Flexion, Controlled, NO END Range Stretch 3 x 10    Seated Flexion with Swiss Ball, NO END Range Stretch, Working on Control 3 x 10 Supine cane  3 x 10 Supine Cane Flexion, Controlled, NO END Range Stretch 3 x 10           Time spent with patient reviewing proper muscle recruitment and technique with exercises. Progression, Guidelines    MANUAL THERAPY: (15 minutes): Joint mobilization and Soft tissue mobilization was utilized and necessary because of the patient's restricted joint motion, painful spasm, loss of articular motion and restricted motion of soft tissue   Soft Tissue Mobilization Left Upper Trap, Levator Scapula, Infraspinatus    MODALITIES: (10 minutes):      *  Cold Pack Therapy in order to provide analgesia, relieve muscle spasm and reduce inflammation and edema. (No Charge). HEP: As above; handouts given to patient for all exercises. Treatment/Session Summary:    · Response to Treatment:  Exercise program slightly reduce today to prevent excessive post treatment soreness due to second visit in a row this week.  Minimal pain/soreness reported at end of treatment following ice modality and good tolerance to exercise and manual interventions provided today. · Communication/Consultation:  None today  · Equipment provided today:  None today  · Recommendations/Intent for next treatment session: Next visit will focus on mobility, flexibility, ROM, strength, muscle activation, per MD guidelines.     Total Treatment Billable Duration:  53 minutes  PT Patient Time In/Time Out  Time In: 7352  Time Out: Yan 1960, PT

## 2022-03-29 ENCOUNTER — HOSPITAL ENCOUNTER (OUTPATIENT)
Dept: PHYSICAL THERAPY | Age: 77
Discharge: HOME | End: 2022-03-29
Payer: MEDICARE

## 2022-03-29 PROCEDURE — 97140 MANUAL THERAPY 1/> REGIONS: CPT

## 2022-03-29 PROCEDURE — 97110 THERAPEUTIC EXERCISES: CPT

## 2022-03-29 NOTE — PROGRESS NOTES
Yamilet Lee  : 1945  Primary: Janeen Ascencio Medicare Advantage  Secondary:  [address] at [facility]  [address] [place], [address] [place] [address], Bhavesh Banner, [address] [place] [address]  [phone]   [phone]       OUTPATIENT PHYSICAL THERAPY:Progress Report 3/29/2022    ICD-10: Treatment Diagnosis: Pain in Left Shoulder (M25.512)  Generalized Muscle Weakness (M62.81)  History of left shoulder replacement (C75.891)              Precautions: Incontinence, OSTEOPOROSIS, Cervical Fusion , Lumbar Fusion   Allergies: Patient has no known allergies. TREATMENT PLAN:  Effective Dates: 3/7/2022 TO 2022 (60 days). Frequency/Duration: 2-3 times a week for 60 Day(s) MEDICAL/REFERRING DIAGNOSIS:  History of left shoulder replacement [Z96.612]   DATE OF ONSET: Left Reverse Total Shoulder Arthroplasty 2022  REFERRING PHYSICIAN: Liberty Tracy MD MD Orders: Evaluate and Treat   Return MD Appointment: 3/22/2022     INITIAL ASSESSMENT:  Ms. Lee is a 65 y.o. female presenting to physical therapy with complaints of severe left shoulder pain and swelling status post left reverse shoulder total shoulder arthroplasty on 2022. Patient reports that during surgery she suffered a lung puncture, but has not really had any difficulty. She does reports having periods since surgery of intense pain. She reports having one episode last week after a home health physical therapy visit and yesterday after reaching to scratch her right arm. She reports difficulty sleeping at various points, reports at times having to sleep in her recliner and other times being able to sleep in her bed. She reports last year she started having trouble with her neck, shoulder and arm. She reports getting diagnosed with pinched nerve in her neck and per patient it was determined that she needed to have neck surgery.  She reports having a C3-4 fusion, 2021, that per patient helped with numbness and tingling her arm and some of her pain, but she continued to have left shoulder pain. She reports that she has a history of bilateral rotator cuff tears. She reports that she is left handed and is extremely eager to return back to full independence and all normal daily activities. Patient presents with increased pain, decreased strength, decreased ROM, decreased flexibility, impaired gait, impaired posture, impaired overhead reach, impaired transfer ability, decreased activity tolerance, and overall impaired functional mobility. Patient is a good candidate for skilled physical therapy interventions to include manual therapy, therapeutic exercise, balance training, gait training, transfer training, postural re-education, body mechanics training, and pain modalities and trigger point dry needling (TPDN) as needed. PROGRESS REPORT:  Ms. Iván Rose is a 65 y.o. female presenting to physical therapy with complaints of severe left shoulder pain and swelling status post left reverse shoulder total shoulder arthroplasty on 2/14/2022. Patient has been seen for 7 sessions of physical therapy from 3/7/2022 to 3/29/2022. She reports feeling 50% better with all daily activities since starting therapy. She has demonstrated improved ROM, muscle activation, decreased pain and improved function. Patient will benefit from continued skilled physical therapy to address remaining goals and deficits. PROBLEM LIST (Impacting functional limitations):  1. Decreased Strength  2. Decreased ADL/Functional Activities  3. Decreased Transfer Abilities  4. Decreased Ambulation Ability/Technique  5. Increased Pain  6. Decreased Activity Tolerance  7. Decreased Pacing Skills  8. Decreased Work Simplification/Energy Conservation Techniques  9. Increased Fatigue  10. Decreased Flexibility/Joint Mobility  11. Edema/Girth  12. Decreased Knowledge of Precautions  13.  Decreased [place] with Home Exercise Program INTERVENTIONS PLANNED: (Treatment may consist of any combination of the following)  1. Bed Mobility  2. Cold  3. Cryotherapy  4. Electrical Stimulation  5. Family Education  6. Gait Training  7. Heat  8. Home Exercise Program (HEP)  9. Manual Therapy  10. Neuromuscular Re-education/Strengthening  11. Range of Motion (ROM)  12. Therapeutic Activites  13. Therapeutic Exercise/Strengthening  14. Transcutaneous Electrical Nerve Stimulation (TENS)  15. Transfer Training  16. Ultrasound (US)  17. Trigger Point Dry Needling (TPDN)     GOALS: (Goals have been discussed and agreed upon with patient.)  Short-Term Functional Goals: Time Frame: 3/7/2022 to 4/6/2022  1. Patient demonstrates independence with home exercise program without verbal cueing provided by therapist. -ONGOING  2. Patient will report no more than 2/10 left shoulder pain at rest in order to demonstrate improved self pain control and tolerance and improve quality of sleep. -ONGOING  3. Patient will be educated in and demonstrate improved upright posture to assist with return to full independence with dressing and bathing. -ONGOING  4. Patient will improve shoulder flexion to 115 degrees to assist with doing her hair without limitations independently. -ONGOING  5. Patient will improve shoulder external rotation to 45 degrees to assist with toileting. -ONGOING  Discharge Goals: Time Frame: 3/7/2022 to 5/6/2022  1. Patient will improve shoulder flexion to 120 degrees to assist with reaching overhead. -ONGOING  2. Patient will improve shoulder strength to 4/5 to assist with lifting and carrying around home and to be able to go shopping. -ONGOING  3. Patient will improve shoulder IR to 45 degrees to assist reaching behind her back. -ONGOING  4. Patient will improve Disability of the Arm, Shoulder, and Hand score to 27/55 from 47/55. -ONGOING    Outcome Measure:    Tool Used: Disabilities of the Arm, Shoulder and Hand (DASH) Questionnaire - Quick Version  Score:  Initial: 47/55  Most Recent: 27/55 (Date: 3/29/2022 ) Interpretation of Score: The DASH is designed to measure the activities of daily living in person's with upper extremity dysfunction or pain. Each section is scored on a 1-5 scale, 5 representing the greatest disability. The scores of each section are added together for a total score of 55. UPDATED OBJECTIVE MEASURES:    Patient denies any headaches, changes in vision, dizziness, vertigo, nausea, drop attacks, black outs, tinnitus, dysphagia, dysarthria, LE symptoms or bowel/bladder dysfunction. Observation/Orthostatic Postural Assessment:          Patient presents with forward head, rounded shoulders, protracted shoulders. Patient demonstrated increased left scapular elevation. Patient presented with guarding left UE posture. Patient demonstrated little to no left arm swing. Palpation:          Patient presented with severe bilateral Cervical/Thoracic Paraspinal, Suboccipital, Scalene, left Upper Trapezius, Levator Scapula, Parascapular, Posterior Cuff, Deltoid, Biceps, Incision tenderness, tightness, trigger points. ROM:          NT = Not Tested  AROM/ PROM Left (degrees) Right (degrees)   Shoulder Flexion 115 (from Passive 89 degrees)  152   Shoulder Abduction 96 (from NT) 150   Shoulder Internal Rotation  15 (from NT) 60   Functional Internal Rotation NT T2   Shoulder External Rotation 55 (from Passive 30 degrees) 80   Functional External Rotation NT T10    Cervical AROM: Flexion 50 degrees, Extension 40 degrees, Side Bending Right 10 degrees, Left 30 degrees, Rotation Right 40 degrees Left 70 degrees. Strength:             Motion Tested Left   (*/5) Right  (*/5)   Shoulder Flexion 3 (from 2) 3   Scaption 3 (from 2) 3   Shoulder Abduction 3 (from 2) 3   Shoulder Internal Rotation  2 3   Shoulder External Rotation 2 3   Shoulder Internal Rotation   (90 abduction) 2 3   Shoulder External Rotation   (90 abduction) 2 3   Elbow Flexion 4 (from 3) 4   Elbow Extension 4 (from 3) 4   Wrist Flexion 4 (from 3) 4   Wrist Extension 4 (from 3) 4     Special Tests:  Patient demonstrated well heal incision. Patient denied any signs or symptoms of deep vein thrombosis or infection. Passive Accessory Motion:         Patient demonstrated limited scapular mobility bilaterally. Neurological Screen:              Myotomes: Key muscle strength testing through bilateral UE is intact, deficits noted above. Dermatomes: Sensation to light touch for bilateral UE is intact from C5 to T1. Reflexes:        Biceps (C5): Intact and Equal        Brachioradialis (C6): Intact and Equal        Triceps (C7): Intact and Equal    Functional Mobility:         Gait/Ambulation:  Patient demonstrated no left UE arm swing, kept arm against body. Patient demonstrated antalgic gait pattern. Transfers:  Patient required minimal assistance for all transfers. Balance:          Patient denied any falls or trouble with her balance. Medical Necessity:   · Patient is expected to demonstrate progress in strength, range of motion, coordination and functional technique to decrease pain with and improve function with dressing, bathing, cooking, cleaning, housework, yardwork, driving, lifting, carrying, reaching overhead/behind head/behind back, and all home and community activities. · Skilled intervention continues to be required due to increased pain, swelling, decreased ROM, mobility, flexibility, strenght, and function. Reason for Services/Other Comments:  · Patient continues to require skilled intervention due to limited functional home and community abilities and increasing complexity of exercises. Rehabilitation Potential For Stated Goals: Good  Regarding Yeyo Arboleda's therapy, I certify that the treatment plan above will be carried out by a therapist or under their direction. Thank you for this referral,  Lavinia Sanchez PT     Referring Physician Signature: Sami Joaquin MD No Signature is Required for this note.

## 2022-03-29 NOTE — PROGRESS NOTES
Juwan Sheridan [place]  : 1945  Primary: Giannicrystal Hardik Medicare Advantage  Secondary:  [address] at [facility]  [address], [address] [place] [address], [place], [address] [place] [address]  [phone]   [phone]      OUTPATIENT PHYSICAL THERAPY: Daily Treatment Note 3/29/2022    ICD-10: Treatment Diagnosis: Pain in Left Shoulder (M25.512)  Generalized Muscle Weakness (M62.81)  History of left shoulder replacement (I54.865)              Precautions: Incontinence, OSTEOPOROSIS, Cervical Fusion , Lumbar Fusion   Allergies: Patient has no known allergies. TREATMENT PLAN:  Effective Dates: 3/7/2022 TO 2022 (60 days). Frequency/Duration: 2-3 times a week for 60 Day(s) MEDICAL/REFERRING DIAGNOSIS:  History of left shoulder replacement [Z96.612]   DATE OF ONSET: Left Reverse Total Shoulder Arthroplasty 2022  REFERRING PHYSICIAN: Nannette Brownlee MD MD Orders: Evaluate and Treat   Return MD Appointment: 3/22/2022     Pre-treatment Symptoms/Complaints:  Patient reports her shoulder is feeling pretty good, neck is bothering her on the left. Pain: Initial: Pain Intensity 1: 3  Pain Location 1: Shoulder  Pain Orientation 1: Left  Post Session:  1/10   Medications Last Reviewed:  3/29/2022  Updated Objective Findings:  Palpation: Left Levator Scapula Tightness, Tenderness, Trigger Points. TREATMENT:   THERAPEUTIC EXERCISE: (38 minutes):  Exercises per grid below to improve mobility, strength and coordination. Required moderate visual, verbal, manual and tactile cues to promote proper body alignment, promote proper body posture, promote proper body mechanics and promote proper body breathing techniques. Progressed resistance, range, repetitions and complexity of movement as indicated.      Date:  3/29/2022 Date:  3/24/2022 Date:  3/25/2022   Activity/Exercise Parameters Parameters Parameters   Cervical ROM      Scapular Retraction Standing Yellow 3 x 10 Standing Back Against Wall and Foam Roller 3 x 15 Standing Back Against Wall and Foam Roller 2 x 15   PROM Flexion 12 minutes    ER 8 minutes Flexion 12 minutes    ER 8 minutes Flexion 12 minutes    ER 8 minutes   Scapular Clock      Bicep Curl Standing Back Against Wall and Foam Roller 3 x 15 Standing Back Against Wall and Foam Roller 3 x 15 Standing Back Against Wall and Foam Roller 3 x 15   Isometrics 10 sec x 10, 2 sec Hold Each IR, ER, Abduction, Flexion, Extension 10 x 10 sec Each IR, ER, Abduction  10 x 10 sec Each IR, ER, Abduction    Rhythmic Stabilization  3 x 10 Each Way CW/CCW 3 x 30 Supine IR/ER Arm Supported 3 x 30 Supine IR/ER Arm Supported   Shoulder ER Supine Cane ER, Controlled, NO END Stretch 3 x 10 Supine cane  3 x 10 Supine Cane ER, Controlled, NO END Stretch 3 x 10   Shoulder Flexion Supine Cane Flexion, Controlled, NO END Range Stretch 3 x 10 Supine cane  3 x 10 Supine Cane Flexion, Controlled, NO END Range Stretch 3 x 10           Time spent with patient reviewing proper muscle recruitment and technique with exercises. Progression, Guidelines    MANUAL THERAPY: (15 minutes): Joint mobilization and Soft tissue mobilization was utilized and necessary because of the patient's restricted joint motion, painful spasm, loss of articular motion and restricted motion of soft tissue   Soft Tissue Mobilization Left Upper Trap, Levator Scapula, Infraspinatus    MODALITIES: (0 minutes):      *  Cold Pack Therapy in order to provide analgesia, relieve muscle spasm and reduce inflammation and edema. (No Charge). HEP: As above; handouts given to patient for all exercises. Treatment/Session Summary:    · Response to Treatment:  Patient reported reported no pain with treatment. Patient required verbal and tactile cues for posture, form, and mechanics. Patient would benefit from continued progression per MD guidelines.    · Communication/Consultation:  None today  · Equipment provided today:  None today  · Recommendations/Intent for next treatment session: Next visit will focus on mobility, flexibility, ROM, strength, muscle activation, per MD guidelines.     Total Treatment Billable Duration:  53 minutes  PT Patient Time In/Time Out  Time In: 0900  Time Out: [facility] [id number], [address], PT

## 2022-04-01 ENCOUNTER — HOSPITAL ENCOUNTER (OUTPATIENT)
Dept: PHYSICAL THERAPY | Age: 77
Discharge: HOME | End: 2022-04-01
Payer: MEDICARE

## 2022-04-01 PROCEDURE — 97140 MANUAL THERAPY 1/> REGIONS: CPT

## 2022-04-01 PROCEDURE — 97110 THERAPEUTIC EXERCISES: CPT

## 2022-04-01 NOTE — PROGRESS NOTES
Emilee Lizarraga [place]  : 1945  Primary: Juan R Joel Medicare Advantage  Secondary:  [address] at [facility]  [address] [place], [address] [place] [address], [place], [address] [place] [address]  [phone]   [phone]      OUTPATIENT PHYSICAL THERAPY: Daily Treatment Note 2022    ICD-10: Treatment Diagnosis: Pain in Left Shoulder (M25.512)  Generalized Muscle Weakness (M62.81)  History of left shoulder replacement (A71.676)              Precautions: Incontinence, OSTEOPOROSIS, Cervical Fusion , Lumbar Fusion   Allergies: Patient has no known allergies. TREATMENT PLAN:  Effective Dates: 3/7/2022 TO 2022 (60 days). Frequency/Duration: 2-3 times a week for 60 Day(s) MEDICAL/REFERRING DIAGNOSIS:  History of left shoulder replacement [Z96.612]   DATE OF ONSET: Left Reverse Total Shoulder Arthroplasty 2022  REFERRING PHYSICIAN: Shakila Mejia MD MD Orders: Evaluate and Treat   Return MD Appointment: 3/22/2022     Pre-treatment Symptoms/Complaints:  Patient reports she is feeling so much better, no pain. She reports over the last two days she has been amazed by how much better she is feeling. Pain: Initial: Pain Intensity 1: 0  Pain Location 1: Shoulder  Pain Orientation 1: Left  Post Session:  0/10   Medications Last Reviewed:  2022  Updated Objective Findings:  Palpation: Left Levator Scapula Tightness, Tenderness, Trigger Points. TREATMENT:   THERAPEUTIC EXERCISE: (38 minutes):  Exercises per grid below to improve mobility, strength and coordination. Required moderate visual, verbal, manual and tactile cues to promote proper body alignment, promote proper body posture, promote proper body mechanics and promote proper body breathing techniques. Progressed resistance, range, repetitions and complexity of movement as indicated.      Date:  3/29/2022 Date:  2022 Date:  3/25/2022   Activity/Exercise Parameters Parameters Parameters   Cervical ROM      Scapular Retraction Standing Yellow 3 x 10 Standing Yellow 3 x 15 Standing Back Against Wall and Foam Roller 2 x 15   PROM Flexion 12 minutes    ER 8 minutes Flexion 12 minutes    ER 8 minutes Flexion 12 minutes    ER 8 minutes   Pendulum  X 30 Each Direction    Bicep Curl Standing Back Against Wall and Foam Roller 3 x 15 Standing Back Against Wall and Foam Roller 3 x 15 2 lbs Standing Back Against Wall and Foam Roller 3 x 15   Isometrics 10 sec x 10, 2 sec Hold Each IR, ER, Abduction, Flexion, Extension 3 x 10 Each Direction Isometric Hold and Side Step IR and ER  10 x 10 sec Each IR, ER, Abduction    Rhythmic Stabilization  3 x 10 Each Way CW/CCW 3 x 30 Supine IR/ER Arm Supported 3 x 30 Supine IR/ER Arm Supported   Shoulder ER Supine Cane ER, Controlled, NO END Stretch 3 x 10 Supine cane  3 x 10 Supine Cane ER, Controlled, NO END Stretch 3 x 10   Shoulder Flexion Supine Cane Flexion, Controlled, NO END Range Stretch 3 x 10 Supine cane  3 x 10 Supine Cane Flexion, Controlled, NO END Range Stretch 3 x 10   UBE  8 minutes FWD only Right Driving Motion      Time spent with patient reviewing proper muscle recruitment and technique with exercises. Progression, Guidelines    MANUAL THERAPY: (15 minutes): Joint mobilization and Soft tissue mobilization was utilized and necessary because of the patient's restricted joint motion, painful spasm, loss of articular motion and restricted motion of soft tissue   Soft Tissue Mobilization Left Upper Trap, Levator Scapula, Infraspinatus    MODALITIES: (0 minutes):      *  Cold Pack Therapy in order to provide analgesia, relieve muscle spasm and reduce inflammation and edema. (No Charge). HEP: As above; handouts given to patient for all exercises. Treatment/Session Summary:    · Response to Treatment:  Patient reported reported no pain with treatment. She is progressing in ROM and strength per MD guidelines. Patient required verbal and tactile cues for posture, form, and mechanics.  Patient would benefit from continued progression per MD guidelines. · Communication/Consultation:  None today  · Equipment provided today:  None today  · Recommendations/Intent for next treatment session: Next visit will focus on mobility, flexibility, ROM, strength, muscle activation, per MD guidelines.     Total Treatment Billable Duration:  53 minutes  PT Patient Time In/Time Out  Time In: 0900  Time Out: [facility] [id number], [address] , PT

## 2022-04-04 ENCOUNTER — HOSPITAL ENCOUNTER (OUTPATIENT)
Dept: PHYSICAL THERAPY | Age: 77
Discharge: HOME | End: 2022-04-04
Payer: MEDICARE

## 2022-04-04 PROCEDURE — 97140 MANUAL THERAPY 1/> REGIONS: CPT

## 2022-04-04 PROCEDURE — 97110 THERAPEUTIC EXERCISES: CPT

## 2022-04-04 NOTE — PROGRESS NOTES
Annie Lee  : 1945  Primary: Nikki Marreropaola Medicare Advantage  Secondary:  [address] at [facility]  [address] [place], [address] [place] [address], [facility], [address] [place] [address]  [phone]   [phone]      OUTPATIENT PHYSICAL THERAPY: Daily Treatment Note 2022    ICD-10: Treatment Diagnosis: Pain in Left Shoulder (M25.512)  Generalized Muscle Weakness (M62.81)  History of left shoulder replacement (V02.737)              Precautions: Incontinence, OSTEOPOROSIS, Cervical Fusion , Lumbar Fusion   Allergies: Patient has no known allergies. TREATMENT PLAN:  Effective Dates: 3/7/2022 TO 2022 (60 days). Frequency/Duration: 2-3 times a week for 60 Day(s) MEDICAL/REFERRING DIAGNOSIS:  History of left shoulder replacement [Z96.612]   DATE OF ONSET: Left Reverse Total Shoulder Arthroplasty 2022  REFERRING PHYSICIAN: Costa Romo MD MD Orders: Evaluate and Treat   Return MD Appointment: 3/22/2022     Pre-treatment Symptoms/Complaints:  Patient reports she is feeling really good. She reports that she did work in her yard this past weekend, but used only her right arm primarily. Patient asked about riding a road bike with her . Pain: Initial: Pain Intensity 1: 0  Pain Location 1: Shoulder  Pain Orientation 1: Left  Post Session:  0/10   Medications Last Reviewed:  2022  Updated Objective Findings:  Palpation: Left Levator Scapula Tightness, Tenderness, Trigger Points. TREATMENT:   THERAPEUTIC EXERCISE: (38 minutes):  Exercises per grid below to improve mobility, strength and coordination. Required moderate visual, verbal, manual and tactile cues to promote proper body alignment, promote proper body posture, promote proper body mechanics and promote proper body breathing techniques. Progressed resistance, range, repetitions and complexity of movement as indicated.      Date:  3/29/2022 Date:  2022 Date:  2022   Activity/Exercise Parameters Parameters Parameters   Cervical ROM      Scapular Retraction Standing Yellow 3 x 10 Standing Yellow 3 x 15 Prone Rows 3 x 10    Standing Yellow 3 x 20   PROM Flexion 12 minutes    ER 8 minutes Flexion 12 minutes    ER 8 minutes Flexion 12 minutes    ER 8 minutes   Pendulum  X 30 Each Direction    Bicep Curl Standing Back Against Wall and Foam Roller 3 x 15 Standing Back Against Wall and Foam Roller 3 x 15 2 lbs Standing Back Against Wall and Foam Roller 3 x 15 2 lbs   Isometrics 10 sec x 10, 2 sec Hold Each IR, ER, Abduction, Flexion, Extension 3 x 10 Each Direction Isometric Hold and Side Step IR and ER  3 x 10 Each Direction Isometric Hold and Side Step IR and ER 10 x 10 sec Each IR, ER, Abduction    Rhythmic Stabilization  3 x 10 Each Way CW/CCW 3 x 30 Supine IR/ER Arm Supported    Shoulder ER Supine Cane ER, Controlled, NO END Stretch 3 x 10 Supine cane  3 x 10 Sidelying 3 x 10 Min Assist   Shoulder Flexion Supine Cane Flexion, Controlled, NO END Range Stretch 3 x 10 Supine cane  3 x 10 Supine Cane Flexion, Controlled, NO END Range Stretch 3 x 10    Seated Swiss Ball 3 x 20   UBE  8 minutes FWD only Right Driving Motion 8 minutes FWD only Right Driving Motion   Shoulder Extension   To Neutral Prone 3 x 10   Punch   With Cane 3 x 10           Time spent with patient reviewing proper muscle recruitment and technique with exercises. Progression, Guidelines    MANUAL THERAPY: (15 minutes): Joint mobilization and Soft tissue mobilization was utilized and necessary because of the patient's restricted joint motion, painful spasm, loss of articular motion and restricted motion of soft tissue   Soft Tissue Mobilization Left Upper Trap, Levator Scapula, Infraspinatus    MODALITIES: (0 minutes):      *  Cold Pack Therapy in order to provide analgesia, relieve muscle spasm and reduce inflammation and edema. (No Charge). HEP: As above; handouts given to patient for all exercises.     Treatment/Session Summary: · Response to Treatment:  Patient reported reported no pain with treatment. She is progressing in ROM and strength per MD guidelines. Patient required verbal and tactile cues for posture, form, and mechanics. Patient would benefit from continued progression per MD guidelines. Her HEP was reviewed and progressed, including the importance related to gentle guarded progression. · Communication/Consultation:  HEP  · Equipment provided today:  None today  · Recommendations/Intent for next treatment session: Next visit will focus on mobility, flexibility, ROM, strength, muscle activation, per MD guidelines.     Total Treatment Billable Duration:  53 minutes  PT Patient Time In/Time Out  Time In: 0900  Time Out: [facility] [address], [address] [facility], PT

## 2022-04-06 ENCOUNTER — HOSPITAL ENCOUNTER (OUTPATIENT)
Dept: PHYSICAL THERAPY | Age: 77
Discharge: HOME | End: 2022-04-06
Payer: MEDICARE

## 2022-04-06 PROCEDURE — 97110 THERAPEUTIC EXERCISES: CPT

## 2022-04-06 PROCEDURE — 97140 MANUAL THERAPY 1/> REGIONS: CPT

## 2022-04-06 NOTE — PROGRESS NOTES
Gloria Montes Earlimart  : 1945  Primary: Brittni Herrera Medicare Advantage  Secondary:  [address] at [facility]  [address] [place], [address] [place] [address], [address] [place] [address]  [phone]   [phone]      OUTPATIENT PHYSICAL THERAPY: Daily Treatment Note 2022    ICD-10: Treatment Diagnosis: Pain in Left Shoulder (M25.512)  Generalized Muscle Weakness (M62.81)  History of left shoulder replacement (G99.543)              Precautions: Incontinence, OSTEOPOROSIS, Cervical Fusion , Lumbar Fusion   Allergies: Patient has no known allergies. TREATMENT PLAN:  Effective Dates: 3/7/2022 TO 2022 (60 days). Frequency/Duration: 2-3 times a week for 60 Day(s) MEDICAL/REFERRING DIAGNOSIS:  History of left shoulder replacement [Z96.612]   DATE OF ONSET: Left Reverse Total Shoulder Arthroplasty 2022  REFERRING PHYSICIAN: Claudine Gregg MD MD Orders: Evaluate and Treat   Return MD Appointment: 3/22/2022     Pre-treatment Symptoms/Complaints:  Patient reports she is still doing better. Pain: Initial: Pain Intensity 1: 0  Pain Location 1: Shoulder  Pain Orientation 1: Left  Post Session:  0/10   Medications Last Reviewed:  2022  Updated Objective Findings:  Palpation: Left Levator Scapula Tightness, Tenderness, Trigger Points. TREATMENT:   THERAPEUTIC EXERCISE: (38 minutes):  Exercises per grid below to improve mobility, strength and coordination. Required moderate visual, verbal, manual and tactile cues to promote proper body alignment, promote proper body posture, promote proper body mechanics and promote proper body breathing techniques. Progressed resistance, range, repetitions and complexity of movement as indicated.      Date:  2022 Date:  2022 Date:  2022   Activity/Exercise Parameters Parameters Parameters   Cervical ROM      Scapular Retraction Standing Yellow 3 x 20    Prone Row 3 x 10 Standing Yellow 3 x 15 Prone Rows 3 x 10    Standing Yellow 3 x 20   PROM Flexion 12 minutes    ER 8 minutes Flexion 12 minutes    ER 8 minutes Flexion 12 minutes    ER 8 minutes   Pendulum  X 30 Each Direction    Bicep Curl Standing Back Against Wall and Foam Roller 3 x 15 2 lbs Standing Back Against Wall and Foam Roller 3 x 15 2 lbs Standing Back Against Wall and Foam Roller 3 x 15 2 lbs   Isometrics 3 x 10 Each Direction Isometric Hold and Side Step IR and ER  3 x 10 Each Direction Isometric Hold and Side Step IR and ER  3 x 10 Each Direction Isometric Hold and Side Step IR and ER 10 x 10 sec Each IR, ER, Abduction    Rhythmic Stabilization   3 x 30 Supine IR/ER Arm Supported    Shoulder ER 3 x 15 SIdelying Supine cane  3 x 10 Sidelying 3 x 10 Min Assist   Shoulder Flexion Supine Cane Flexion, Controlled, NO END Range Stretch 3 x 10    Seated Swiss Ball 3 x 20 Supine cane  3 x 10 Supine Cane Flexion, Controlled, NO END Range Stretch 3 x 10    Seated Swiss Ball 3 x 20   UBE 10 minutes FWD only Right Driving Motion 8 minutes FWD only Right Driving Motion 8 minutes FWD only Right Driving Motion   Shoulder Extension To Neutral Prone 3 x 10  To Neutral Prone 3 x 10   Punch With Cane 3 x 10  With Cane 3 x 10           Time spent with patient reviewing proper muscle recruitment and technique with exercises. Progression, Guidelines    MANUAL THERAPY: (15 minutes): Joint mobilization and Soft tissue mobilization was utilized and necessary because of the patient's restricted joint motion, painful spasm, loss of articular motion and restricted motion of soft tissue   Soft Tissue Mobilization Left Upper Trap, Levator Scapula, Infraspinatus    MODALITIES: (0 minutes):      *  Cold Pack Therapy in order to provide analgesia, relieve muscle spasm and reduce inflammation and edema. (No Charge). HEP: As above; handouts given to patient for all exercises. Treatment/Session Summary:    · Response to Treatment:  Patient continued to report no pain with treatment.  She is continuing to progress in ROM and strength per MD guidelines. Patient required verbal and tactile cues for posture, form, and mechanics. Patient would benefit from continued progression per MD guidelines. Her HEP was reviewed and progressed, including the importance related to gentle guarded progression. · Communication/Consultation:  HEP  · Equipment provided today:  None today  · Recommendations/Intent for next treatment session: Next visit will focus on mobility, flexibility, ROM, strength, muscle activation, per MD guidelines.     Total Treatment Billable Duration:  53 minutes  PT Patient Time In/Time Out  Time In: 0900  Time Out: [place] [address], [address], PT

## 2022-04-11 ENCOUNTER — HOSPITAL ENCOUNTER (OUTPATIENT)
Dept: PHYSICAL THERAPY | Age: 77
Discharge: HOME | End: 2022-04-11
Payer: MEDICARE

## 2022-04-11 PROCEDURE — 97110 THERAPEUTIC EXERCISES: CPT

## 2022-04-11 PROCEDURE — 97140 MANUAL THERAPY 1/> REGIONS: CPT

## 2022-04-11 NOTE — PROGRESS NOTES
Marybeth Lee  : 1945  Primary: Guanaco Casanova Medicare Advantage  Secondary:  [address] at [facility]  [address] [place], [address] [place] [address], [place], [address] [place] [address]  [phone]   [phone]      OUTPATIENT PHYSICAL THERAPY: Daily Treatment Note 2022    ICD-10: Treatment Diagnosis: Pain in Left Shoulder (M25.512)  Generalized Muscle Weakness (M62.81)  History of left shoulder replacement (T03.497)              Precautions: Incontinence, OSTEOPOROSIS, Cervical Fusion , Lumbar Fusion   Allergies: Patient has no known allergies. TREATMENT PLAN:  Effective Dates: 3/7/2022 TO 2022 (60 days). Frequency/Duration: 2-3 times a week for 60 Day(s) MEDICAL/REFERRING DIAGNOSIS:  History of left shoulder replacement [Z96.612]   DATE OF ONSET: Left Reverse Total Shoulder Arthroplasty 2022  REFERRING PHYSICIAN: Loren Montiel MD MD Orders: Evaluate and Treat   Return MD Appointment: 3/22/2022     Pre-treatment Symptoms/Complaints:  Patient reports she \"is amazed about how good I have been feeling over the last week. \" She reports it is wonderful to wake up without pain. Pain: Initial: Pain Intensity 1: 0  Pain Location 1: Shoulder  Pain Orientation 1: Left  Post Session:  0/10   Medications Last Reviewed:  2022  Updated Objective Findings:  Palpation: Left Levator Scapula Tightness, Tenderness, Trigger Points. TREATMENT:   THERAPEUTIC EXERCISE: (38 minutes):  Exercises per grid below to improve mobility, strength and coordination. Required moderate visual, verbal, manual and tactile cues to promote proper body alignment, promote proper body posture, promote proper body mechanics and promote proper body breathing techniques. Progressed resistance, range, repetitions and complexity of movement as indicated.      Date:  2022 Date:  2022 Date:  2022   Activity/Exercise Parameters Parameters Parameters   Cervical ROM      Scapular Retraction Standing Yellow 3 x 20    Prone Row 3 x 10 Standing Yellow 3 x 20    Prone Row, Staggered Stance Standing 3 x 15 Prone Rows 3 x 10    Standing Yellow 3 x 20   PROM Flexion 12 minutes    ER 8 minutes Flexion 4 minutes    ER 4 minutes Flexion 12 minutes    ER 8 minutes   Pendulum      Bicep Curl Standing Back Against Wall and Foam Roller 3 x 15 2 lbs Standing Back Against Wall and Foam Roller 3 x 15 2 lbs Standing Back Against Wall and Foam Roller 3 x 15 2 lbs   Isometrics 3 x 10 Each Direction Isometric Hold and Side Step IR and ER  3 x 10 Each Direction Isometric Hold and Side Step IR and ER  3 x 10 Each Direction Isometric Hold and Side Step IR and ER 10 x 10 sec Each IR, ER, Abduction    Rhythmic Stabilization       Shoulder ER 3 x 15 SIdelying 3 x 15 Sidelying AROM Sidelying 3 x 10 Min Assist   Shoulder Flexion Supine Cane Flexion, Controlled, NO END Range Stretch 3 x 10    Seated Swiss Ball 3 x 20 Supine 3 x 10 AROM Supine Cane Flexion, Controlled, NO END Range Stretch 3 x 10    Seated Swiss Ball 3 x 20   UBE 10 minutes FWD only Right Driving Motion 10 minutes FWD only Right Driving Motion 8 minutes FWD only Right Driving Motion   Shoulder Extension To Neutral Prone 3 x 10 To Neutral Prone 3 x 10 To Neutral Prone 3 x 10   Punch With Cane 3 x 10 With Cane 3 x 20 With Cane 3 x 10           Time spent with patient reviewing proper muscle recruitment and technique with exercises. Progression, Guidelines    MANUAL THERAPY: (15 minutes): Joint mobilization and Soft tissue mobilization was utilized and necessary because of the patient's restricted joint motion, painful spasm, loss of articular motion and restricted motion of soft tissue   Soft Tissue Mobilization Left Upper Trap, Levator Scapula, Infraspinatus    MODALITIES: (0 minutes):      *  Cold Pack Therapy in order to provide analgesia, relieve muscle spasm and reduce inflammation and edema. (No Charge).     HEP: As above; handouts given to patient for all exercises. Treatment/Session Summary:    · Response to Treatment:  Patient continued to report no pain with treatment. She is continuing to progress in ROM and strength per MD guidelines. Patient required verbal and tactile cues for posture, form, and mechanics. She did demonstrate some muscle fatigue. She would benefit from continued progression per MD guidelines. · Communication/Consultation:  HEP  · Equipment provided today:  None today  · Recommendations/Intent for next treatment session: Next visit will focus on mobility, flexibility, ROM, strength, muscle activation, per MD guidelines.     Total Treatment Billable Duration:  53 minutes  PT Patient Time In/Time Out  Time In: 0900  Time Out: [facility] [address], [address], PT

## 2022-04-13 ENCOUNTER — HOSPITAL ENCOUNTER (OUTPATIENT)
Dept: PHYSICAL THERAPY | Age: 77
Discharge: HOME | End: 2022-04-13
Payer: MEDICARE

## 2022-04-13 PROCEDURE — 97140 MANUAL THERAPY 1/> REGIONS: CPT

## 2022-04-13 PROCEDURE — 97110 THERAPEUTIC EXERCISES: CPT

## 2022-04-13 NOTE — PROGRESS NOTES
Cheri Lee  : 1945  Primary: Lauren Espinoza Medicare Advantage  Secondary:  [address] at [facility]  [address] [place], [address] [place] [address], [place], [address] [place] [address]  [phone]   [phone]      OUTPATIENT PHYSICAL THERAPY: Daily Treatment Note 2022    ICD-10: Treatment Diagnosis: Pain in Left Shoulder (M25.512)  Generalized Muscle Weakness (M62.81)  History of left shoulder replacement (I87.915)              Precautions: Incontinence, OSTEOPOROSIS, Cervical Fusion , Lumbar Fusion   Allergies: Patient has no known allergies. TREATMENT PLAN:  Effective Dates: 3/7/2022 TO 2022 (60 days). Frequency/Duration: 2-3 times a week for 60 Day(s) MEDICAL/REFERRING DIAGNOSIS:  History of left shoulder replacement [Z96.612]   DATE OF ONSET: Left Reverse Total Shoulder Arthroplasty 2022  REFERRING PHYSICIAN: Javad Newell MD MD Orders: Evaluate and Treat   Return MD Appointment: 3/22/2022     Pre-treatment Symptoms/Complaints:  Patient reports she was a little sore after last session for about half a day and then felt good. Pain: Initial: Pain Intensity 1: 0  Pain Location 1: Shoulder  Pain Orientation 1: Left  Post Session:  0/10   Medications Last Reviewed:  2022  Updated Objective Findings:  Palpation: Left Levator Scapula, Upper Trapezius, Biceps, Deltoid Tightness, Tenderness, Trigger Points. TREATMENT:   THERAPEUTIC EXERCISE: (38 minutes):  Exercises per grid below to improve mobility, strength and coordination. Required moderate visual, verbal, manual and tactile cues to promote proper body alignment, promote proper body posture, promote proper body mechanics and promote proper body breathing techniques. Progressed resistance, range, repetitions and complexity of movement as indicated.      Date:  2022 Date:  2022 Date:  2022   Activity/Exercise Parameters Parameters Parameters   Cervical ROM      Scapular Retraction Standing Yellow 3 x 20    Prone Row 3 x 10 Standing Yellow 3 x 20    Prone Row, Staggered Stance Standing 3 x 15 Prone Rows 3 x 10    Standing Yellow 3 x 20    With ER Standing Back Against Wall/Foam Roller 3 x 10   PROM Flexion 12 minutes    ER 8 minutes Flexion 4 minutes    ER 4 minutes Flexion 4 minutes    ER 2 minutes   Pendulum      Bicep Curl Standing Back Against Wall and Foam Roller 3 x 15 2 lbs Standing Back Against Wall and Foam Roller 3 x 15 2 lbs Standing Back Against Wall and Foam Roller 3 x 15 2 lbs   Isometrics 3 x 10 Each Direction Isometric Hold and Side Step IR and ER  3 x 10 Each Direction Isometric Hold and Side Step IR and ER  3 x 10 Each Direction Isometric Hold and Side Step IR and ER    Rhythmic Stabilization       Shoulder ER 3 x 15 SIdelying 3 x 15 Sidelying AROM Sidelying 3 x 10 AROM   Shoulder Flexion Supine Cane Flexion, Controlled, NO END Range Stretch 3 x 10    Seated Swiss Ball 3 x 20 Supine 3 x 10 AROM Standing, Foam Roll Cane Flexion 3 x 10   UBE 10 minutes FWD only Right Driving Motion 10 minutes FWD only Right Driving Motion 8 minutes FWD only Right Driving Motion   Shoulder Extension To Neutral Prone 3 x 10 To Neutral Prone 3 x 10 To Neutral Prone 3 x 10   Punch With Cane 3 x 10 With Cane 3 x 20 With Cane 3 x 20           Time spent with patient reviewing proper muscle recruitment and technique with exercises. Progression, Guidelines    MANUAL THERAPY: (15 minutes): Joint mobilization and Soft tissue mobilization was utilized and necessary because of the patient's restricted joint motion, painful spasm, loss of articular motion and restricted motion of soft tissue   Soft Tissue Mobilization Left Upper Trap, Levator Scapula, Infraspinatus, Biceps    MODALITIES: (0 minutes):      *  Cold Pack Therapy in order to provide analgesia, relieve muscle spasm and reduce inflammation and edema. (No Charge). HEP: As above; handouts given to patient for all exercises.     Treatment/Session Summary: · Response to Treatment:  Patient continues to demonstrate muscle activation progression, reported no pain or soreness with treatment. She is continuing to progress in ROM and strength per MD guidelines. Patient required verbal and tactile cues for posture, form, and mechanics. She did demonstrate some muscle fatigue. She would benefit from continued progression per MD guidelines. · Communication/Consultation:  HEP  · Equipment provided today:  None today  · Recommendations/Intent for next treatment session: Next visit will focus on mobility, flexibility, ROM, strength, muscle activation, per MD guidelines.     Total Treatment Billable Duration:  53 minutes  PT Patient Time In/Time Out  Time In: 0900  Time Out: [facility] [address], [address], PT

## 2022-04-19 ENCOUNTER — HOSPITAL ENCOUNTER (OUTPATIENT)
Dept: PHYSICAL THERAPY | Age: 77
Discharge: HOME | End: 2022-04-19
Payer: MEDICARE

## 2022-04-19 PROCEDURE — 97110 THERAPEUTIC EXERCISES: CPT

## 2022-04-19 PROCEDURE — 97140 MANUAL THERAPY 1/> REGIONS: CPT

## 2022-04-19 NOTE — PROGRESS NOTES
Star Lee  : 1945  Primary: David Daily Medicare Advantage  Secondary:  [address] at [facility]  [address] [facility], [address] [place] [address], [address] [place] [address] Street  [phone]   [phone]      OUTPATIENT PHYSICAL THERAPY: Daily Treatment Note 2022    ICD-10: Treatment Diagnosis: Pain in Left Shoulder (M25.512)  Generalized Muscle Weakness (M62.81)  History of left shoulder replacement (R74.223)              Precautions: Incontinence, OSTEOPOROSIS, Cervical Fusion , Lumbar Fusion   Allergies: Patient has no known allergies. TREATMENT PLAN:  Effective Dates: 3/7/2022 TO 2022 (60 days). Frequency/Duration: 2-3 times a week for 60 Day(s) MEDICAL/REFERRING DIAGNOSIS:  History of left shoulder replacement [Z96.612]   DATE OF ONSET: Left Reverse Total Shoulder Arthroplasty 2022  REFERRING PHYSICIAN: Db Keller MD MD Orders: Evaluate and Treat   Return MD Appointment: 3/22/2022     Pre-treatment Symptoms/Complaints:  Patient reports she \"felt really good over the weekend. \"     Pain: Initial: Pain Intensity 1: 0  Pain Location 1: Shoulder  Pain Orientation 1: Left  Post Session:  0/10   Medications Last Reviewed:  2022  Updated Objective Findings:  Palpation: Left Levator Scapula, Upper Trapezius, Biceps, Deltoid Tightness, Tenderness, Trigger Points. TREATMENT:   THERAPEUTIC EXERCISE: (38 minutes):  Exercises per grid below to improve mobility, strength and coordination. Required moderate visual, verbal, manual and tactile cues to promote proper body alignment, promote proper body posture, promote proper body mechanics and promote proper body breathing techniques. Progressed resistance, range, repetitions and complexity of movement as indicated.      Date:  2022 Date:  2022 Date:  2022   Activity/Exercise Parameters Parameters Parameters   Cervical ROM      Scapular Retraction Prone Rows 3 x 15    Standing Yellow 3 x 20    With ER Standing Back Against Wall/Foam Roller 3 x 15  Standing Yellow 3 x 20    Prone Row, Staggered Stance Standing 3 x 15 Prone Rows 3 x 10    Standing Yellow 3 x 20    With ER Standing Back Against Wall/Foam Roller 3 x 10   PROM  Flexion 4 minutes    ER 4 minutes Flexion 4 minutes    ER 2 minutes   Pendulum      Bicep Curl Standing Back Against Wall and Foam Roller 3 x 15 3 lbs Standing Back Against Wall and Foam Roller 3 x 15 2 lbs Standing Back Against Wall and Foam Roller 3 x 15 2 lbs   Isometrics 3 x 10 Each Direction Isometric Hold and Side Step IR and ER 3 x 10 Each Direction Isometric Hold and Side Step IR and ER  3 x 10 Each Direction Isometric Hold and Side Step IR and ER    Rhythmic Stabilization       Shoulder ER 3 x 15 Sidelying AROM 3 x 15 Sidelying AROM Sidelying 3 x 10 AROM   Shoulder Flexion Standing, Foam Roll Cane Flexion 3 x 10 Supine 3 x 10 AROM Standing, Foam Roll Cane Flexion 3 x 10   UBE 10 minutes FWD only Right Driving Motion 10 minutes FWD only Right Driving Motion 8 minutes FWD only Right Driving Motion   Shoulder Extension To Neutral Prone 3 x 10 To Neutral Prone 3 x 10 To Neutral Prone 3 x 10   Punch With Cane 3 x 20 With Cane 3 x 20 With Cane 3 x 20           Time spent with patient reviewing proper muscle recruitment and technique with exercises. Progression, Guidelines    MANUAL THERAPY: (15 minutes): Joint mobilization and Soft tissue mobilization was utilized and necessary because of the patient's restricted joint motion, painful spasm, loss of articular motion and restricted motion of soft tissue   Soft Tissue Mobilization Left Upper Trap, Levator Scapula, Infraspinatus, Biceps    MODALITIES: (0 minutes):      *  Cold Pack Therapy in order to provide analgesia, relieve muscle spasm and reduce inflammation and edema. (No Charge). HEP: As above; handouts given to patient for all exercises.     Treatment/Session Summary:    · Response to Treatment:  Patient continues to progress per MD guidelines from a ROM, muscle activation, strength. She continues to require verbal and tactile cues for posture, form, and mechanics. Patient would benefit from continued graded progression per MD guidelines. · Communication/Consultation:  HEP  · Equipment provided today:  None today  · Recommendations/Intent for next treatment session: Next visit will focus on mobility, flexibility, ROM, strength, muscle activation, per MD guidelines.     Total Treatment Billable Duration:  53 minutes  PT Patient Time In/Time Out  Time In: 1758  Time Out: [address], PT

## 2022-04-21 ENCOUNTER — HOSPITAL ENCOUNTER (OUTPATIENT)
Dept: PHYSICAL THERAPY | Age: 77
Discharge: HOME | End: 2022-04-21
Payer: MEDICARE

## 2022-04-21 PROCEDURE — 97110 THERAPEUTIC EXERCISES: CPT

## 2022-04-21 PROCEDURE — 97140 MANUAL THERAPY 1/> REGIONS: CPT

## 2022-04-21 NOTE — PROGRESS NOTES
Jennifer Lee  : 1945  Primary: Dajajoelle Montavlo Medicare Advantage  Secondary:  [address] at [facility]  [address] [place], [address] [place] [address], [place], [address] [place] [address]  [phone]   [phone]       OUTPATIENT PHYSICAL THERAPY:Progress Report 2022    ICD-10: Treatment Diagnosis: Pain in Left Shoulder (M25.512)  Generalized Muscle Weakness (M62.81)  History of left shoulder replacement (K54.932)              Precautions: Incontinence, OSTEOPOROSIS, Cervical Fusion , Lumbar Fusion   Allergies: Patient has no known allergies. TREATMENT PLAN:  Effective Dates: 3/7/2022 TO 2022 (60 days). Frequency/Duration: 2-3 times a week for 60 Day(s) MEDICAL/REFERRING DIAGNOSIS:  History of left shoulder replacement [Z96.612]   DATE OF ONSET: Left Reverse Total Shoulder Arthroplasty 2022  REFERRING PHYSICIAN: Jose Christy MD MD Orders: Evaluate and Treat   Return MD Appointment: 3/22/2022     INITIAL ASSESSMENT:  Ms. Lee is a 65 y.o. female presenting to physical therapy with complaints of severe left shoulder pain and swelling status post left reverse shoulder total shoulder arthroplasty on 2022. Patient reports that during surgery she suffered a lung puncture, but has not really had any difficulty. She does reports having periods since surgery of intense pain. She reports having one episode last week after a home health physical therapy visit and yesterday after reaching to scratch her right arm. She reports difficulty sleeping at various points, reports at times having to sleep in her recliner and other times being able to sleep in her bed. She reports last year she started having trouble with her neck, shoulder and arm. She reports getting diagnosed with pinched nerve in her neck and per patient it was determined that she needed to have neck surgery.  She reports having a C3-4 fusion, 2021, that per patient helped with numbness and tingling her arm and some of her pain, but she continued to have left shoulder pain. She reports that she has a history of bilateral rotator cuff tears. She reports that she is left handed and is extremely eager to return back to full independence and all normal daily activities. Patient presents with increased pain, decreased strength, decreased ROM, decreased flexibility, impaired gait, impaired posture, impaired overhead reach, impaired transfer ability, decreased activity tolerance, and overall impaired functional mobility. Patient is a good candidate for skilled physical therapy interventions to include manual therapy, therapeutic exercise, balance training, gait training, transfer training, postural re-education, body mechanics training, and pain modalities and trigger point dry needling (TPDN) as needed. PROGRESS REPORT:  Ms. Dada Holly is a 65 y.o. female presenting to physical therapy with complaints of severe left shoulder pain and swelling status post left reverse shoulder total shoulder arthroplasty on 2/14/2022. Patient has been seen for 14 sessions of physical therapy from 3/7/2022 to 4/21/2022. She reports feeling 75% better with all daily activities since starting therapy. She has demonstrated improved ROM, muscle activation, decreased pain and improved function. Patient will benefit from continued skilled physical therapy to address remaining goals and deficits. PROBLEM LIST (Impacting functional limitations):  1. Decreased Strength  2. Decreased ADL/Functional Activities  3. Decreased Transfer Abilities  4. Decreased Ambulation Ability/Technique  5. Increased Pain  6. Decreased Activity Tolerance  7. Decreased Pacing Skills  8. Decreased Work Simplification/Energy Conservation Techniques  9. Increased Fatigue  10. Decreased Flexibility/Joint Mobility  11. Edema/Girth  12. Decreased Knowledge of Precautions  13.  Decreased [place] with Home Exercise Program INTERVENTIONS PLANNED: (Treatment may consist of any combination of the following)  1. Bed Mobility  2. Cold  3. Cryotherapy  4. Electrical Stimulation  5. Family Education  6. Gait Training  7. Heat  8. Home Exercise Program (HEP)  9. Manual Therapy  10. Neuromuscular Re-education/Strengthening  11. Range of Motion (ROM)  12. Therapeutic Activites  13. Therapeutic Exercise/Strengthening  14. Transcutaneous Electrical Nerve Stimulation (TENS)  15. Transfer Training  16. Ultrasound (US)  17. Trigger Point Dry Needling (TPDN)     GOALS: (Goals have been discussed and agreed upon with patient.)  Short-Term Functional Goals: Time Frame: 3/7/2022 to 4/6/2022  1. Patient demonstrates independence with home exercise program without verbal cueing provided by therapist. -ONGOING  2. Patient will report no more than 2/10 left shoulder pain at rest in order to demonstrate improved self pain control and tolerance and improve quality of sleep. -ONGOING  3. Patient will be educated in and demonstrate improved upright posture to assist with return to full independence with dressing and bathing. -ONGOING  4. Patient will improve shoulder flexion to 115 degrees to assist with doing her hair without limitations independently. -ONGOING  5. Patient will improve shoulder external rotation to 45 degrees to assist with toileting. -ONGOING  Discharge Goals: Time Frame: 3/7/2022 to 5/6/2022  1. Patient will improve shoulder flexion to 120 degrees to assist with reaching overhead. -ONGOING  2. Patient will improve shoulder strength to 4/5 to assist with lifting and carrying around home and to be able to go shopping. -ONGOING  3. Patient will improve shoulder IR to 45 degrees to assist reaching behind her back. -ONGOING  4. Patient will improve Disability of the Arm, Shoulder, and Hand score to 27/55 from 47/55. -MET    Outcome Measure:    Tool Used: Disabilities of the Arm, Shoulder and Hand (DASH) Questionnaire - Quick Version  Score:  Initial: 47/55  Most Recent: 18/55 (Date: 4/21/2022 ) Interpretation of Score: The DASH is designed to measure the activities of daily living in person's with upper extremity dysfunction or pain. Each section is scored on a 1-5 scale, 5 representing the greatest disability. The scores of each section are added together for a total score of 55. UPDATED OBJECTIVE MEASURES:    Patient denies any headaches, changes in vision, dizziness, vertigo, nausea, drop attacks, black outs, tinnitus, dysphagia, dysarthria, LE symptoms or bowel/bladder dysfunction. Observation/Orthostatic Postural Assessment:          Patient presents with forward head, rounded shoulders, protracted shoulders. Patient demonstrated increased left scapular elevation. Patient presented with guarding left UE posture. Patient demonstrated little to no left arm swing. Palpation:          Patient presented with severe bilateral Cervical/Thoracic Paraspinal, Suboccipital, Scalene, left Upper Trapezius, Levator Scapula, Parascapular, Posterior Cuff, Deltoid, Biceps, Incision tenderness, tightness, trigger points. ROM:          NT = Not Tested  AROM/ PROM Left (degrees) Right (degrees)   Shoulder Flexion 134 (from Passive 89 degrees)  152   Shoulder Abduction 110 (from NT) 150   Shoulder Internal Rotation  21 (from NT) 60   Functional Internal Rotation L5 (from NT) T2   Shoulder External Rotation 65 (from Passive 30 degrees) 80   Functional External Rotation C6 (from NT) T10    Cervical AROM: Flexion 50 degrees, Extension 40 degrees, Side Bending Right 10 degrees, Left 30 degrees, Rotation Right 40 degrees Left 70 degrees. Strength:             Motion Tested Left   (*/5) Right  (*/5)   Shoulder Flexion 3 (from 2) 3   Scaption 3 (from 2) 3   Shoulder Abduction 3 (from 2) 3   Shoulder Internal Rotation  3 (from 2) 3   Shoulder External Rotation 3 (from 2) 3   Shoulder Internal Rotation   (90 abduction) 3 (from 2) 3   Shoulder External Rotation   (90 abduction) 3 (from 2) 3   Elbow Flexion 4 (from 3) 4   Elbow Extension 4 (from 3) 4   Wrist Flexion 4 (from 3) 4   Wrist Extension 4 (from 3) 4     Special Tests:  Patient demonstrated well heal incision. Patient denied any signs or symptoms of deep vein thrombosis or infection. Passive Accessory Motion:         Patient demonstrated limited scapular mobility bilaterally. Neurological Screen:              Myotomes: Key muscle strength testing through bilateral UE is intact, deficits noted above. Dermatomes: Sensation to light touch for bilateral UE is intact from C5 to T1. Reflexes:        Biceps (C5): Intact and Equal        Brachioradialis (C6): Intact and Equal        Triceps (C7): Intact and Equal    Functional Mobility:         Gait/Ambulation:  Patient demonstrated no left UE arm swing, kept arm against body. Patient demonstrated antalgic gait pattern. Transfers:  Patient required minimal assistance for all transfers. Balance:          Patient denied any falls or trouble with her balance. Medical Necessity:   · Patient is expected to demonstrate progress in strength, range of motion, coordination and functional technique to decrease pain with and improve function with dressing, bathing, cooking, cleaning, housework, yardwork, driving, lifting, carrying, reaching overhead/behind head/behind back, and all home and community activities. · Skilled intervention continues to be required due to increased pain, swelling, decreased ROM, mobility, flexibility, strenght, and function. Reason for Services/Other Comments:  · Patient continues to require skilled intervention due to limited functional home and community abilities and increasing complexity of exercises. Rehabilitation Potential For Stated Goals: Good  Regarding Poly Arboleda's therapy, I certify that the treatment plan above will be carried out by a therapist or under their direction.   Thank you for this referral,  Kimberly Thapa PT     Referring Physician Signature: Saima Ta, Augustin Martin MD No Signature is Required for this note.

## 2022-04-21 NOTE — PROGRESS NOTES
Annie Yo Engadine  : 1945  Primary: Nikki Euceda Medicare Advantage  Secondary:  [address] at [facility]  [address] [place], [address] [place] [address], [address]  [phone]   [phone]      OUTPATIENT PHYSICAL THERAPY: Daily Treatment Note 2022    ICD-10: Treatment Diagnosis: Pain in Left Shoulder (M25.512)  Generalized Muscle Weakness (M62.81)  History of left shoulder replacement (B00.929)              Precautions: Incontinence, OSTEOPOROSIS, Cervical Fusion , Lumbar Fusion   Allergies: Patient has no known allergies. TREATMENT PLAN:  Effective Dates: 3/7/2022 TO 2022 (60 days). Frequency/Duration: 2-3 times a week for 60 Day(s) MEDICAL/REFERRING DIAGNOSIS:  History of left shoulder replacement [Z96.612]   DATE OF ONSET: Left Reverse Total Shoulder Arthroplasty 2022  REFERRING PHYSICIAN: Costa Romo MD MD Orders: Evaluate and Treat   Return MD Appointment: 3/22/2022     Pre-treatment Symptoms/Complaints:  Patient reports she went for a 2-3 mile walk and per patient her shoulder got sore towards the end. She reports no pain today. Pain: Initial: Pain Intensity 1: 0  Pain Location 1: Shoulder  Pain Orientation 1: Left  Post Session:  0/10   Medications Last Reviewed:  2022  Updated Objective Findings:  Palpation: Left Levator Scapula, Upper Trapezius, Biceps, Deltoid Tightness, Tenderness, Trigger Points. TREATMENT:   THERAPEUTIC EXERCISE: (38 minutes):  Exercises per grid below to improve mobility, strength and coordination. Required moderate visual, verbal, manual and tactile cues to promote proper body alignment, promote proper body posture, promote proper body mechanics and promote proper body breathing techniques. Progressed resistance, range, repetitions and complexity of movement as indicated.      Date:  2022 Date:  2022 Date:  2022   Activity/Exercise Parameters Parameters Parameters   Cervical ROM Scapular Retraction Prone Rows 3 x 15    Standing Yellow 3 x 20    With ER Standing Back Against Wall/Foam Roller 3 x 15  Standing Red 3 x 20    With ER Standing Back Against Wall/Foam Roller 3 x 15  Prone Rows 3 x 10    Standing Yellow 3 x 20    With ER Standing Back Against Wall/Foam Roller 3 x 10   PROM   Flexion 4 minutes    ER 2 minutes   Pendulum      Bicep Curl Standing Back Against Wall and Foam Roller 3 x 15 3 lbs Standing Back Against Wall and Foam Roller 3 x 15 3 lbs Standing Back Against Wall and Foam Roller 3 x 15 2 lbs   Isometrics 3 x 10 Each Direction Isometric Hold and Side Step IR and ER 3 x 10 Each Direction Isometric Hold and Side Step IR and ER  3 x 10 Each Direction Isometric Hold and Side Step IR and ER    Rhythmic Stabilization       Shoulder ER 3 x 15 Sidelying AROM 3 x 15 Sidelying AROM Sidelying 3 x 10 AROM   Shoulder Flexion Standing, Foam Roll Cane Flexion 3 x 10 Standing, Foam Roll Cane Flexion 3 x 10    Standing Foam Roll Cane Scaption 3 x 10 Standing, Foam Roll Cane Flexion 3 x 10   UBE 10 minutes FWD only Right Driving Motion 10 minutes FWD only Right Driving Motion 8 minutes FWD only Right Driving Motion   Shoulder Extension To Neutral Prone 3 x 10  To Neutral Prone 3 x 10   Punch With Cane 3 x 20 With Cane 3 x 20 With Cane 3 x 20           Time spent with patient reviewing proper muscle recruitment and technique with exercises. Progression, Guidelines    MANUAL THERAPY: (15 minutes): Joint mobilization and Soft tissue mobilization was utilized and necessary because of the patient's restricted joint motion, painful spasm, loss of articular motion and restricted motion of soft tissue   Soft Tissue Mobilization Left Upper Trap, Levator Scapula, Infraspinatus, Biceps    MODALITIES: (0 minutes):      *  Cold Pack Therapy in order to provide analgesia, relieve muscle spasm and reduce inflammation and edema. (No Charge).     HEP: As above; handouts given to patient for all exercises. Treatment/Session Summary:    · Response to Treatment:  Patient continues to progress per MD guidelines from a ROM, muscle activation, strength. She reported no pain or discomfort with treatment. She continued to require verbal cues for posture and form. She continues to require verbal and tactile cues for posture, form, and mechanics. Patient would benefit from continued graded progression per MD guidelines. · Communication/Consultation:  HEP  · Equipment provided today:  None today  · Recommendations/Intent for next treatment session: Next visit will focus on mobility, flexibility, ROM, strength, muscle activation, per MD guidelines.     Total Treatment Billable Duration:  53 minutes  PT Patient Time In/Time Out  Time In: 0900  Time Out: [address], PT

## 2022-04-26 ENCOUNTER — HOSPITAL ENCOUNTER (OUTPATIENT)
Dept: PHYSICAL THERAPY | Age: 77
Discharge: HOME | End: 2022-04-26
Payer: MEDICARE

## 2022-04-26 PROCEDURE — 97140 MANUAL THERAPY 1/> REGIONS: CPT

## 2022-04-26 PROCEDURE — 97110 THERAPEUTIC EXERCISES: CPT

## 2022-04-26 NOTE — PROGRESS NOTES
Julian Lee  : 1945  Primary: Shoshana Turner Medicare Advantage  Secondary:  [address] at [facility]  [address] [place], [address] [place] [address], [address] [place] [address] Street  [phone]   [phone]      OUTPATIENT PHYSICAL THERAPY: Daily Treatment Note 2022    ICD-10: Treatment Diagnosis: Pain in Left Shoulder (M25.512)  Generalized Muscle Weakness (M62.81)  History of left shoulder replacement (F13.542)              Precautions: Incontinence, OSTEOPOROSIS, Cervical Fusion , Lumbar Fusion   Allergies: Patient has no known allergies. TREATMENT PLAN:  Effective Dates: 3/7/2022 TO 2022 (60 days). Frequency/Duration: 2-3 times a week for 60 Day(s) MEDICAL/REFERRING DIAGNOSIS:  History of left shoulder replacement [Z96.612]   DATE OF ONSET: Left Reverse Total Shoulder Arthroplasty 2022  REFERRING PHYSICIAN: Sydney Salazar MD MD Orders: Evaluate and Treat   Return MD Appointment: 3/22/2022     Pre-treatment Symptoms/Complaints:  Patient reports that her weekend went well, no pain. Patient reports that her right shoulder appears to be limiting her more then her right. Pain: Initial: Pain Intensity 1: 0  Pain Location 1: Shoulder  Pain Orientation 1: Left  Post Session:  0/10   Medications Last Reviewed:  2022  Updated Objective Findings:  Palpation: Left Levator Scapula, Upper Trapezius, Biceps, Deltoid Tightness, Tenderness, Trigger Points. TREATMENT:   THERAPEUTIC EXERCISE: (38 minutes):  Exercises per grid below to improve mobility, strength and coordination. Required moderate visual, verbal, manual and tactile cues to promote proper body alignment, promote proper body posture, promote proper body mechanics and promote proper body breathing techniques. Progressed resistance, range, repetitions and complexity of movement as indicated.      Date:  2022 Date:  2022 Date:  2022   Activity/Exercise Parameters Parameters Parameters   Cervical ROM      Scapular Retraction Prone Rows 3 x 15    Standing Yellow 3 x 20    With ER Standing Back Against Wall/Foam Roller 3 x 15  Standing Red 3 x 20    With ER Standing Back Against Wall/Foam Roller 3 x 15  Standing Yellow 3 x 20    With ER Standing Back Against Wall/Foam Roller 3 x 15     Standing Low Row 3 x 20 Yello   PROM      Pendulum      Bicep Curl Standing Back Against Wall and Foam Roller 3 x 15 3 lbs Standing Back Against Wall and Foam Roller 3 x 15 3 lbs Standing Back Against Wall and Foam Roller 3 x 15 3 lbs   Isometrics 3 x 10 Each Direction Isometric Hold and Side Step IR and ER 3 x 10 Each Direction Isometric Hold and Side Step IR and ER     Rhythmic Stabilization       Shoulder ER 3 x 15 Sidelying AROM 3 x 15 Sidelying AROM Sidelying 3 x 15 AROM   Shoulder Flexion Standing, Foam Roll Cane Flexion 3 x 10 Standing, Foam Roll Cane Flexion 3 x 10    Standing Foam Roll Cane Scaption 3 x 10 Standing, Foam Roll 1 lb, 3 x 10 Flexion    Standing Foam Roll Wall 1 lbs Scaption 3 x 10   UBE 10 minutes FWD only Right Driving Motion 10 minutes FWD only Right Driving Motion 10 minutes FWD only Right Driving Motion   Shoulder Extension To Neutral Prone 3 x 10     Punch With Cane 3 x 20 With Cane 3 x 20 2 lbs 3 x 20           Time spent with patient reviewing proper muscle recruitment and technique with exercises. Progression, Guidelines    MANUAL THERAPY: (15 minutes): Joint mobilization and Soft tissue mobilization was utilized and necessary because of the patient's restricted joint motion, painful spasm, loss of articular motion and restricted motion of soft tissue   Soft Tissue Mobilization Left Upper Trap, Levator Scapula, Infraspinatus, Biceps    MODALITIES: (0 minutes):      *  Cold Pack Therapy in order to provide analgesia, relieve muscle spasm and reduce inflammation and edema. (No Charge). HEP: As above; handouts given to patient for all exercises.     Treatment/Session Summary:    · Response to Treatment:  Patient reported no pain during or after treatment. Patient continues to progress from a ROM and strength standpoint, but continues to require need for verbal and tactile cues for posture, form, and mechanics and to avoid compensatory movement patterns, such as improper scapular elevation. Patient was monitored regarding any right shoulder discomfort and session was altered to ensure no aggravation of any right shoulder issues. Patient would benefit from continued progression of ROM and strength per guidelines. · Communication/Consultation:  HEP  · Equipment provided today:  None today  · Recommendations/Intent for next treatment session: Next visit will focus on mobility, flexibility, ROM, strength, muscle activation, per MD guidelines.     Total Treatment Billable Duration:  53 minutes  PT Patient Time In/Time Out  Time In: 0900  Time Out: [place] [address], [address], PT

## 2022-04-28 ENCOUNTER — HOSPITAL ENCOUNTER (OUTPATIENT)
Dept: PHYSICAL THERAPY | Age: 77
Discharge: HOME | End: 2022-04-28
Payer: MEDICARE

## 2022-04-28 PROCEDURE — 97140 MANUAL THERAPY 1/> REGIONS: CPT

## 2022-04-28 PROCEDURE — 97110 THERAPEUTIC EXERCISES: CPT

## 2022-04-28 NOTE — PROGRESS NOTES
Senora Nageotte [place]  : 1945  Primary: Angy Gerardo Medicare Advantage  Secondary:  [address] at [facility]  [address] [place], [address] [place] [address], [address]  [phone]   [phone]      OUTPATIENT PHYSICAL THERAPY: Daily Treatment Note 2022    ICD-10: Treatment Diagnosis: Pain in Left Shoulder (M25.512)  Generalized Muscle Weakness (M62.81)  History of left shoulder replacement (D62.722)              Precautions: Incontinence, OSTEOPOROSIS, Cervical Fusion , Lumbar Fusion   Allergies: Patient has no known allergies. TREATMENT PLAN:  Effective Dates: 3/7/2022 TO 2022 (60 days). Frequency/Duration: 2-3 times a week for 60 Day(s) MEDICAL/REFERRING DIAGNOSIS:  History of left shoulder replacement [Z96.612]   DATE OF ONSET: Left Reverse Total Shoulder Arthroplasty 2022  REFERRING PHYSICIAN: Bran Rolon MD MD Orders: Evaluate and Treat   Return MD Appointment: 3/22/2022     Pre-treatment Symptoms/Complaints:  Patient reports that she had trace workout muscle soreness after last session, but it went away quickly. She reports it was a good soreness. She reports that she aggravated her right shoulder last night, sleeping on it wrong. Pain: Initial: Pain Intensity 1: 0  Pain Location 1: Shoulder  Pain Orientation 1: Left  Post Session:  0/10   Medications Last Reviewed:  2022  Updated Objective Findings:  Palpation: Left Levator Scapula, Upper Trapezius, Biceps, Deltoid Tightness, Tenderness, Trigger Points. TREATMENT:   THERAPEUTIC EXERCISE: (38 minutes):  Exercises per grid below to improve mobility, strength and coordination. Required moderate visual, verbal, manual and tactile cues to promote proper body alignment, promote proper body posture, promote proper body mechanics and promote proper body breathing techniques. Progressed resistance, range, repetitions and complexity of movement as indicated.      Date:  2022 Date:  4/21/2022 Date:  4/26/2022   Activity/Exercise Parameters Parameters Parameters   Cervical ROM      Scapular Retraction Standing Yellow 3 x 20    With ER Standing Back Against Wall/Foam Roller 3 x 15     Standing Low Row 3 x 20 Yellow Standing Red 3 x 20    With ER Standing Back Against Wall/Foam Roller 3 x 15  Standing Yellow 3 x 20    With ER Standing Back Against Wall/Foam Roller 3 x 15     Standing Low Row 3 x 20 Yellow   PROM      Pendulum      Bicep Curl Standing Back Against Wall and Foam Roller 3 x 15 3 lbs Standing Back Against Wall and Foam Roller 3 x 15 3 lbs Standing Back Against Wall and Foam Roller 3 x 15 3 lbs   Isometrics  3 x 10 Each Direction Isometric Hold and Side Step IR and ER     Rhythmic Stabilization       Shoulder ER 3 x 15 Sidelying AROM 3 x 15 Sidelying AROM Sidelying 3 x 15 AROM   Shoulder Flexion Standing, Foam Roll 1 lb, 3 x 10 Flexion    Standing Foam Roll Wall 1 lbs Scaption 3 x 10 Standing, Foam Roll Cane Flexion 3 x 10    Standing Foam Roll Cane Scaption 3 x 10 Standing, Foam Roll 1 lb, 3 x 10 Flexion    Standing Foam Roll Wall 1 lbs Scaption 3 x 10   UBE 10 minutes FWD only Right Driving Motion 10 minutes FWD only Right Driving Motion 10 minutes FWD only Right Driving Motion   Shoulder Extension To Neutral Prone 3 x 10     Punch 2 lbs 3 x 20 With Cane 3 x 20 2 lbs 3 x 20           Time spent with patient reviewing proper muscle recruitment and technique with exercises. Progression, Guidelines    MANUAL THERAPY: (15 minutes): Joint mobilization and Soft tissue mobilization was utilized and necessary because of the patient's restricted joint motion, painful spasm, loss of articular motion and restricted motion of soft tissue   Soft Tissue Mobilization Left Upper Trap, Levator Scapula, Infraspinatus, Biceps    MODALITIES: (0 minutes):      *  Cold Pack Therapy in order to provide analgesia, relieve muscle spasm and reduce inflammation and edema. (No Charge).     HEP: As above; handouts given to patient for all exercises. Treatment/Session Summary:    · Response to Treatment:  Patient continues to demonstrate improvements in ROM and overall quality of movement. She has demonstrated continued intermittent compensatory movement patterns and requires verbal and tactile cues to correct. She would benefit from continued progression per MD guidelines with emphasis on joint protection and function. · Communication/Consultation:  HEP  · Equipment provided today:  None today  · Recommendations/Intent for next treatment session: Next visit will focus on mobility, flexibility, ROM, strength, muscle activation, per MD guidelines.     Total Treatment Billable Duration:  53 minutes  PT Patient Time In/Time Out  Time In: 0900  Time Out: [facility] [id number], [address] , PT

## 2022-05-02 ENCOUNTER — HOSPITAL ENCOUNTER (OUTPATIENT)
Dept: PHYSICAL THERAPY | Age: 77
Setting detail: RECURRING SERIES
Discharge: HOME | End: 2022-05-05

## 2022-05-02 ENCOUNTER — HOSPITAL ENCOUNTER (OUTPATIENT)
Dept: PHYSICAL THERAPY | Age: 77
Discharge: HOME | End: 2022-05-02
Payer: MEDICARE

## 2022-05-02 ENCOUNTER — APPOINTMENT (RX ONLY)
Dept: URBAN - METROPOLITAN AREA CLINIC 329 | Facility: CLINIC | Age: 77
Setting detail: DERMATOLOGY
End: 2022-05-02

## 2022-05-02 DIAGNOSIS — L81.4 OTHER MELANIN HYPERPIGMENTATION: ICD-10-CM

## 2022-05-02 DIAGNOSIS — D18.0 HEMANGIOMA: ICD-10-CM

## 2022-05-02 DIAGNOSIS — L82.1 OTHER SEBORRHEIC KERATOSIS: ICD-10-CM

## 2022-05-02 DIAGNOSIS — D22 MELANOCYTIC NEVI: ICD-10-CM

## 2022-05-02 PROBLEM — D18.01 HEMANGIOMA OF SKIN AND SUBCUTANEOUS TISSUE: Status: ACTIVE | Noted: 2022-05-02

## 2022-05-02 PROBLEM — D22.71 MELANOCYTIC NEVI OF RIGHT LOWER LIMB, INCLUDING HIP: Status: ACTIVE | Noted: 2022-05-02

## 2022-05-02 PROBLEM — D22.72 MELANOCYTIC NEVI OF LEFT LOWER LIMB, INCLUDING HIP: Status: ACTIVE | Noted: 2022-05-02

## 2022-05-02 PROBLEM — D22.5 MELANOCYTIC NEVI OF TRUNK: Status: ACTIVE | Noted: 2022-05-02

## 2022-05-02 PROCEDURE — 99213 OFFICE O/P EST LOW 20 MIN: CPT

## 2022-05-02 PROCEDURE — 97110 THERAPEUTIC EXERCISES: CPT

## 2022-05-02 PROCEDURE — ? FULL BODY SKIN EXAM

## 2022-05-02 PROCEDURE — 97140 MANUAL THERAPY 1/> REGIONS: CPT

## 2022-05-02 PROCEDURE — ? COUNSELING

## 2022-05-02 PROCEDURE — ? SUNSCREEN RECOMMENDATIONS

## 2022-05-02 PROCEDURE — ? ADDITIONAL NOTES

## 2022-05-02 ASSESSMENT — LOCATION DETAILED DESCRIPTION DERM
LOCATION DETAILED: PERIUMBILICAL SKIN
LOCATION DETAILED: SUPERIOR THORACIC SPINE
LOCATION DETAILED: RIGHT MEDIAL SUPERIOR CHEST
LOCATION DETAILED: RIGHT PROXIMAL PRETIBIAL REGION
LOCATION DETAILED: LEFT INFERIOR MEDIAL UPPER BACK
LOCATION DETAILED: LEFT ANTERIOR DISTAL THIGH
LOCATION DETAILED: RIGHT POPLITEAL SKIN
LOCATION DETAILED: EPIGASTRIC SKIN
LOCATION DETAILED: RIGHT PROXIMAL DORSAL FOREARM
LOCATION DETAILED: LEFT PROXIMAL DORSAL FOREARM
LOCATION DETAILED: LEFT DISTAL POSTERIOR THIGH
LOCATION DETAILED: SUPERIOR LUMBAR SPINE

## 2022-05-02 ASSESSMENT — LOCATION SIMPLE DESCRIPTION DERM
LOCATION SIMPLE: ABDOMEN
LOCATION SIMPLE: RIGHT PRETIBIAL REGION
LOCATION SIMPLE: CHEST
LOCATION SIMPLE: LEFT POSTERIOR THIGH
LOCATION SIMPLE: RIGHT FOREARM
LOCATION SIMPLE: LEFT UPPER BACK
LOCATION SIMPLE: UPPER BACK
LOCATION SIMPLE: LOWER BACK
LOCATION SIMPLE: RIGHT POPLITEAL SKIN
LOCATION SIMPLE: LEFT FOREARM
LOCATION SIMPLE: LEFT THIGH

## 2022-05-02 ASSESSMENT — LOCATION ZONE DERM
LOCATION ZONE: TRUNK
LOCATION ZONE: LEG
LOCATION ZONE: ARM

## 2022-05-02 NOTE — PROGRESS NOTES
Sapna Lee  : 1945  Primary: Mansi Garcia Medicare Advantage  Secondary:  [address] at [facility]  [address], [address] [place] [address], 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 TREATMENT PLAN:  Effective Dates: 3/7/2022 TO 2022 (60 days). Frequency/Duration: 2-3 times a week for 60 Day(s) MEDICAL/REFERRING DIAGNOSIS:  History of left shoulder replacement [Z96.612]   DATE OF ONSET: Left Reverse Total Shoulder Arthroplasty 2022  REFERRING PHYSICIAN: Terri Patel MD MD Orders: Evaluate and Treat   Return MD Appointment: 3/22/2022     Pre-treatment Symptoms/Complaints:  Patient reports right shoulder pain is main concern. No pain left shoulder    Pain: Initial:    Post Session:  0/10   Medications Last Reviewed:  2022  Updated Objective Findings:  Palpation: Left Levator Scapula, Upper Trapezius, Biceps, Deltoid Tightness, Tenderness, Trigger Points. TREATMENT:   THERAPEUTIC EXERCISE: (38 minutes):  Exercises per grid below to improve mobility, strength and coordination. Required moderate visual, verbal, manual and tactile cues to promote proper body alignment, promote proper body posture, promote proper body mechanics and promote proper body breathing techniques. Progressed resistance, range, repetitions and complexity of movement as indicated.      Date:  2022 Date:  22 Date:  2022   Activity/Exercise Parameters Parameters Parameters   Cervical ROM      Scapular Retraction Standing Yellow 3 x 20    With ER Standing Back Against Wall/Foam Roller 3 x 15     Standing Low Row 3 x 20 Yellow Standing Red 3 x 20    With ER Standing Back Against Wall/Foam Roller 3 x 15  Standing Yellow 3 x 20    With ER Standing Back Against Wall/Foam Roller 3 x 15     Standing Low Row 3 x 20 Yellow   PROM      Pendulum      Bicep Curl Standing Back Against Wall and Foam Roller 3 x 15 3 lbs Standing Back Against Wall and Foam Roller 3 x 15 3 lbs Standing Back Against Wall and Foam Roller 3 x 15 3 lbs   Isometrics      Rhythmic Stabilization       Shoulder ER 3 x 15 Sidelying AROM 3 x 15 Sidelying AROM Sidelying 3 x 15 AROM   Shoulder Flexion Standing, Foam Roll 1 lb, 3 x 10 Flexion    Standing Foam Roll Wall 1 lbs Scaption 3 x 10 Standing, Foam Roll Cane Flexion 1 lb 3 x 10    Standing Foam Roll Cane Scaption 1 lb 3 x 10 Standing, Foam Roll 1 lb, 3 x 10 Flexion    Standing Foam Roll Wall 1 lbs Scaption 3 x 10   UBE 10 minutes FWD only Right Driving Motion 10 minutes FWD only Right Driving Motion 10 minutes FWD only Right Driving Motion   Shoulder Extension To Neutral Prone 3 x 10 To neutral prone 3 x 10    Punch 2 lbs 3 x 20 2 lbs  3 x 10 2 lbs 3 x 20           Time spent with patient reviewing proper muscle recruitment and technique with exercises. Progression, Guidelines    MANUAL THERAPY: (15 minutes): Joint mobilization and Soft tissue mobilization was utilized and necessary because of the patient's restricted joint motion, painful spasm, loss of articular motion and restricted motion of soft tissue   Soft Tissue Mobilization Left Upper Trap, Levator Scapula, Infraspinatus, Biceps    MODALITIES: (0 minutes):      *  Cold Pack Therapy in order to provide analgesia, relieve muscle spasm and reduce inflammation and edema. (No Charge). HEP: As above; handouts given to patient for all exercises.     Treatment/Session Summary:    · Response to Treatment:  Patient reports right shoulder pain so exercises modified to use left UE only on some exercises  · Communication/Consultation:  HEP  · Equipment provided today: None today  · Recommendations/Intent for next treatment session: Next visit will focus on mobility, flexibility, ROM, strength, muscle activation, per MD guidelines.     Total Treatment Billable Duration:  53 minutes  PT Patient Time In/Time Out  Time In: 0956  Time Out: [address], [place]

## 2022-05-04 ENCOUNTER — HOSPITAL ENCOUNTER (OUTPATIENT)
Dept: PHYSICAL THERAPY | Age: 77
Discharge: HOME | End: 2022-05-04
Payer: MEDICARE

## 2022-05-04 PROCEDURE — 97110 THERAPEUTIC EXERCISES: CPT

## 2022-05-04 PROCEDURE — 97140 MANUAL THERAPY 1/> REGIONS: CPT

## 2022-05-04 NOTE — PROGRESS NOTES
Emily Lee  : 1945  Primary: Rayshawn Gastelum Medicare Advantage  Secondary:  [address] at [facility]  [address] [place], [address] [place] [address], Bhavesh gutierrez, [address] [place] [address]  [phone]   [phone]       OUTPATIENT PHYSICAL THERAPY:Recertification [id number]    ICD-10: Treatment Diagnosis: Pain in Left Shoulder (M25.512)  Generalized Muscle Weakness (M62.81)  History of left shoulder replacement (I77.919)              Precautions: Incontinence, OSTEOPOROSIS, Cervical Fusion , Lumbar Fusion   Allergies: Patient has no known allergies. TREATMENT PLAN:  Effective Dates: 2022 TO 6/15/2022 (45 days). Frequency/Duration: 1-2 times a week for 45 Day(s) MEDICAL/REFERRING DIAGNOSIS:  History of left shoulder replacement [Z96.612]   DATE OF ONSET: Left Reverse Total Shoulder Arthroplasty 2022  REFERRING PHYSICIAN: Sam Polk MD MD Orders: Evaluate and Treat   Return MD Appointment: 3/22/2022     INITIAL ASSESSMENT:  Ms. Lee is a 65 y.o. female presenting to physical therapy with complaints of severe left shoulder pain and swelling status post left reverse shoulder total shoulder arthroplasty on 2022. Patient reports that during surgery she suffered a lung puncture, but has not really had any difficulty. She does reports having periods since surgery of intense pain. She reports having one episode last week after a home health physical therapy visit and yesterday after reaching to scratch her right arm. She reports difficulty sleeping at various points, reports at times having to sleep in her recliner and other times being able to sleep in her bed. She reports last year she started having trouble with her neck, shoulder and arm. She reports getting diagnosed with pinched nerve in her neck and per patient it was determined that she needed to have neck surgery.  She reports having a C3-4 fusion, 2021, that per patient helped with numbness and tingling her arm and some of her pain, but she continued to have left shoulder pain. She reports that she has a history of bilateral rotator cuff tears. She reports that she is left handed and is extremely eager to return back to full independence and all normal daily activities. Patient presents with increased pain, decreased strength, decreased ROM, decreased flexibility, impaired gait, impaired posture, impaired overhead reach, impaired transfer ability, decreased activity tolerance, and overall impaired functional mobility. Patient is a good candidate for skilled physical therapy interventions to include manual therapy, therapeutic exercise, balance training, gait training, transfer training, postural re-education, body mechanics training, and pain modalities and trigger point dry needling (TPDN) as needed. PROGRESS AND RECERTIFICATION REPORT:  Ms. Grace Martinez is a 65 y.o. female presenting to physical therapy with complaints of severe left shoulder pain and swelling status post left reverse shoulder total shoulder arthroplasty on 2/14/2022. Patient has been seen for 18 sessions of physical therapy from 3/7/2022 to 5/4/2022. She reports feeling 75% better with all daily activities since starting therapy. She has demonstrated improved ROM, muscle activation, decreased pain and improved function. She continues to have strength and functional deficits. Patient will benefit from continued skilled physical therapy to address remaining goals and deficits. PROBLEM LIST (Impacting functional limitations):  1. Decreased Strength  2. Decreased ADL/Functional Activities  3. Decreased Transfer Abilities  4. Decreased Ambulation Ability/Technique  5. Increased Pain  6. Decreased Activity Tolerance  7. Decreased Pacing Skills  8. Decreased Work Simplification/Energy Conservation Techniques  9. Increased Fatigue  10. Decreased Flexibility/Joint Mobility  11. Edema/Girth  12. Decreased Knowledge of Precautions  13.  Decreased [place] with Home Exercise Program INTERVENTIONS PLANNED: (Treatment may consist of any combination of the following)  1. Bed Mobility  2. Cold  3. Cryotherapy  4. Electrical Stimulation  5. Family Education  6. Gait Training  7. Heat  8. Home Exercise Program (HEP)  9. Manual Therapy  10. Neuromuscular Re-education/Strengthening  11. Range of Motion (ROM)  12. Therapeutic Activites  13. Therapeutic Exercise/Strengthening  14. Transcutaneous Electrical Nerve Stimulation (TENS)  15. Transfer Training  16. Ultrasound (US)  17. Trigger Point Dry Needling (TPDN)     GOALS: (Goals have been discussed and agreed upon with patient.)  Short-Term Functional Goals: Time Frame: 5/4/2022 TO 6/4/2022  1. Patient demonstrates independence with home exercise program without verbal cueing provided by therapist. -ONGOING  2. Patient will report no more than 2/10 left shoulder pain at rest in order to demonstrate improved self pain control and tolerance and improve quality of sleep. -MET  3. Patient will be educated in and demonstrate improved upright posture to assist with return to full independence with dressing and bathing. -MET  4. Patient will improve shoulder flexion to 115 degrees to assist with doing her hair without limitations independently. -MET  5. Patient will improve shoulder external rotation to 45 degrees to assist with toileting. -MET  Discharge Goals: Time Frame: 5/4/2022 TO 6/15/2022  1. Patient will improve shoulder flexion to 120 degrees to assist with reaching overhead. -MET  2. Patient will improve shoulder strength to 4/5 to assist with lifting and carrying around home and to be able to go shopping. -ONGOING  3. Patient will improve shoulder IR to 45 degrees to assist reaching behind her back. -ONGOING  4. Patient will improve Disability of the Arm, Shoulder, and Hand score to 27/55 from 47/55. -MET    Outcome Measure:    Tool Used: Disabilities of the Arm, Shoulder and Hand (DASH) Questionnaire - Quick Version  Score:  Initial: 47/55  Most Recent: 15/55 (Date: 5/4/2022 )   Interpretation of Score: The DASH is designed to measure the activities of daily living in person's with upper extremity dysfunction or pain. Each section is scored on a 1-5 scale, 5 representing the greatest disability. The scores of each section are added together for a total score of 55. UPDATED OBJECTIVE MEASURES:    Patient denies any headaches, changes in vision, dizziness, vertigo, nausea, drop attacks, black outs, tinnitus, dysphagia, dysarthria, LE symptoms or bowel/bladder dysfunction. Observation/Orthostatic Postural Assessment:          Patient presents with forward head, rounded shoulders, protracted shoulders. Patient demonstrated increased left scapular elevation. Patient presented with guarding left UE posture. Patient demonstrated little to no left arm swing. Palpation:          Patient presented with severe bilateral Cervical/Thoracic Paraspinal, Suboccipital, Scalene, left Upper Trapezius, Levator Scapula, Parascapular, Posterior Cuff, Deltoid, Biceps, Incision tenderness, tightness, trigger points. ROM:          NT = Not Tested  AROM/ PROM Left (degrees) Right (degrees)   Shoulder Flexion 141 (from Passive 89 degrees)  152   Shoulder Abduction 110 (from NT) 150   Shoulder Internal Rotation  21 (from NT) 60   Functional Internal Rotation L5 (from NT) T2   Shoulder External Rotation 65 (from Passive 30 degrees) 80   Functional External Rotation T1 (from NT) T10    Cervical AROM: Flexion 50 degrees, Extension 40 degrees, Side Bending Right 10 degrees, Left 30 degrees, Rotation Right 40 degrees Left 70 degrees. Strength:             Motion Tested Left   (*/5) Right  (*/5)   Shoulder Flexion 3 (from 2) 3   Scaption 4 (from 2) 3   Shoulder Abduction 3 (from 2) 3   Shoulder Internal Rotation  4 (from 2) 3   Shoulder External Rotation 3 (from 2) 3   Shoulder Internal Rotation   (90 abduction) 3 (from 2) 3   Shoulder External Rotation   (90 abduction) 3 (from 2) 3   Elbow Flexion 4 (from 3) 4   Elbow Extension 4 (from 3) 4   Wrist Flexion 4 (from 3) 4   Wrist Extension 4 (from 3) 4     Special Tests:  Patient demonstrated well heal incision. Patient denied any signs or symptoms of deep vein thrombosis or infection. Passive Accessory Motion:         Patient demonstrated limited scapular mobility bilaterally. Neurological Screen:              Myotomes: Key muscle strength testing through bilateral UE is intact, deficits noted above. Dermatomes: Sensation to light touch for bilateral UE is intact from C5 to T1. Reflexes:        Biceps (C5): Intact and Equal        Brachioradialis (C6): Intact and Equal        Triceps (C7): Intact and Equal    Functional Mobility:         Gait/Ambulation:  Patient demonstrated no left UE arm swing, kept arm against body. Patient demonstrated antalgic gait pattern. Transfers:  Patient required minimal assistance for all transfers. Balance:          Patient denied any falls or trouble with her balance. Medical Necessity:   · Patient is expected to demonstrate progress in strength, range of motion, coordination and functional technique to decrease pain with and improve function with dressing, bathing, cooking, cleaning, housework, yardwork, driving, lifting, carrying, reaching overhead/behind head/behind back, and all home and community activities. · Skilled intervention continues to be required due to increased pain, swelling, decreased ROM, mobility, flexibility, strenght, and function. Reason for Services/Other Comments:  · Patient continues to require skilled intervention due to limited functional home and community abilities and increasing complexity of exercises. Rehabilitation Potential For Stated Goals: Good  Regarding Juwan Arboleda's therapy, I certify that the treatment plan above will be carried out by a therapist or under their direction.   Thank you for this referral,  John Tomlin PT     Referring Physician Signature: Shell Gerardo, MD _______________________________ Date _____________

## 2022-05-04 NOTE — PROGRESS NOTES
Candido Lee  : 1945  Primary: Maritza Anne Medicare Advantage  Secondary:  [address] at [facility]  [address], [address] [place] [address], [facility], [address] [place] [address]  [phone]   [phone]      OUTPATIENT PHYSICAL THERAPY: Daily Treatment Note 2022    ICD-10: Treatment Diagnosis: Pain in Left Shoulder (M25.512)  Generalized Muscle Weakness (M62.81)  History of left shoulder replacement (Q66.329)              Precautions: Incontinence, OSTEOPOROSIS, Cervical Fusion , Lumbar Fusion   Allergies: Patient has no known allergies. TREATMENT PLAN:  Effective Dates: 2022 TO 6/15/2022 (45 days). Frequency/Duration: 1-2 times a week for 45 Day(s) MEDICAL/REFERRING DIAGNOSIS:  History of left shoulder replacement [Z96.612]   DATE OF ONSET: Left Reverse Total Shoulder Arthroplasty 2022  REFERRING PHYSICIAN: Drew Dee MD MD Orders: Evaluate and Treat   Return MD Appointment: 3/22/2022     Pre-treatment Symptoms/Complaints:  Patient reports that her left shoulder is feeling really good. She reports that she bruised her right shoulder, but is not sure how. She reports she was working around the house yesterday and felt a click in right shoulder, but other then that she does not remember anything. Pain: Initial: Pain Intensity 1: 0  Pain Location 1: Shoulder  Pain Orientation 1: Left  Post Session:  0/10   Medications Last Reviewed:  2022  Updated Objective Findings:  Palpation: Trace Left Levator Scapula, Upper Trapezius, Biceps, Deltoid Tightness, Tenderness, Trigger Points. TREATMENT:   THERAPEUTIC EXERCISE: (38 minutes):  Exercises per grid below to improve mobility, strength and coordination. Required moderate visual, verbal, manual and tactile cues to promote proper body alignment, promote proper body posture, promote proper body mechanics and promote proper body breathing techniques.   Progressed resistance, range, repetitions and complexity of movement as indicated. Date:  4/28/2022 Date:  5/2/22 Date:  5/4/22   Activity/Exercise Parameters Parameters Parameters   Cervical ROM      Scapular Retraction Standing Yellow 3 x 20    With ER Standing Back Against Wall/Foam Roller 3 x 15     Standing Low Row 3 x 20 Yellow Standing Red 3 x 20    With ER Standing Back Against Wall/Foam Roller 3 x 15  Standing Yellow 3 x 20    With ER Standing Back Against Wall/Foam Roller 3 x 15     Standing Low Row 3 x 20 Yellow   PROM      Pendulum      Bicep Curl Standing Back Against Wall and Foam Roller 3 x 15 3 lbs Standing Back Against Wall and Foam Roller 3 x 15 3 lbs Standing Back Against Wall and Foam Roller 3 x 15 3 lbs   Isometrics      Rhythmic Stabilization       Shoulder ER 3 x 15 Sidelying AROM 3 x 15 Sidelying AROM Sidelying 3 x 15 AROM   Shoulder Flexion Standing, Foam Roll 1 lb, 3 x 10 Flexion    Standing Foam Roll Wall 1 lbs Scaption 3 x 10 Standing, Foam Roll Cane Flexion 1 lb 3 x 10    Standing Foam Roll Cane Scaption 1 lb 3 x 10 Standing, Foam Roll 1 lb, 3 x 10 Flexion    Standing Foam Roll Wall 1 lbs Scaption 3 x 10   UBE 10 minutes FWD only Right Driving Motion 10 minutes FWD only Right Driving Motion 10 minutes FWD only Right Driving Motion   Shoulder Extension To Neutral Prone 3 x 10 To neutral prone 3 x 10 To neutral prone 3 x 10   Punch 2 lbs 3 x 20 2 lbs  3 x 10 2 lbs 3 x 20           Time spent with patient reviewing proper muscle recruitment and technique with exercises. Progression, Guidelines    MANUAL THERAPY: (15 minutes): Joint mobilization and Soft tissue mobilization was utilized and necessary because of the patient's restricted joint motion, painful spasm, loss of articular motion and restricted motion of soft tissue   Soft Tissue Mobilization Left Upper Trap, Levator Scapula, Infraspinatus, Biceps    MODALITIES: (0 minutes):      *  Cold Pack Therapy in order to provide analgesia, relieve muscle spasm and reduce inflammation and edema. (No Charge). HEP: As above; handouts given to patient for all exercises. Treatment/Session Summary:    · Response to Treatment:  Patient reported no pain with treatment. Patients session was altered to only work the left arm and shoulder to ensure no aggravation of right shoulder. Patient continues to have strength deficits and would benefit from continued progression of strength and function. · Communication/Consultation:  HEP  · Equipment provided today:  None today  · Recommendations/Intent for next treatment session: Next visit will focus on mobility, flexibility, ROM, strength, muscle activation, per MD guidelines.     Total Treatment Billable Duration:  53 minutes  PT Patient Time In/Time Out  Time In: 1000  Time Out: [address], PT

## 2022-05-11 ENCOUNTER — HOSPITAL ENCOUNTER (OUTPATIENT)
Dept: PHYSICAL THERAPY | Age: 77
Discharge: HOME | End: 2022-05-11
Payer: MEDICARE

## 2022-05-11 PROCEDURE — 97140 MANUAL THERAPY 1/> REGIONS: CPT

## 2022-05-11 PROCEDURE — 97110 THERAPEUTIC EXERCISES: CPT

## 2022-05-11 NOTE — PROGRESS NOTES
Amber Lee  : 1945  Primary: Thomas Dubois Medicare Advantage  Secondary:  [address] at [facility]  [address] [place], [address] [place] [address], [facility], [address] [place] [address]  [phone]   [phone]      OUTPATIENT PHYSICAL THERAPY: Daily Treatment Note 2022    ICD-10: Treatment Diagnosis: Pain in Left Shoulder (M25.512)  Generalized Muscle Weakness (M62.81)  History of left shoulder replacement (O29.490)              Precautions: Incontinence, OSTEOPOROSIS, Cervical Fusion , Lumbar Fusion   Allergies: Patient has no known allergies. TREATMENT PLAN:  Effective Dates: 2022 TO 6/15/2022 (45 days). Frequency/Duration: 1-2 times a week for 45 Day(s) MEDICAL/REFERRING DIAGNOSIS:  History of left shoulder replacement [Z96.612]   DATE OF ONSET: Left Reverse Total Shoulder Arthroplasty 2022  REFERRING PHYSICIAN: Contreras Chávez MD MD Orders: Evaluate and Treat   Return MD Appointment: 3/22/2022     Pre-treatment Symptoms/Complaints:  Patient reports that she is having a some lateral left shoulder soreness. She reports that her right shoulder is causing her more and more discomfort. She reports that she saw MD last week and per patient he was happy with her progress. Pain: Initial: Pain Intensity 1: 0  Pain Location 1: Shoulder  Pain Orientation 1: Left  Post Session:  0/10   Medications Last Reviewed:  2022  Updated Objective Findings:  Palpation: Trace Left Levator Scapula, Upper Trapezius, Biceps, Deltoid Tightness, Tenderness, Trigger Points. TREATMENT:   THERAPEUTIC EXERCISE: (38 minutes):  Exercises per grid below to improve mobility, strength and coordination. Required moderate visual, verbal, manual and tactile cues to promote proper body alignment, promote proper body posture, promote proper body mechanics and promote proper body breathing techniques. Progressed resistance, range, repetitions and complexity of movement as indicated. Date:  5/11/2022 Date:  5/2/22 Date:  5/4/22   Activity/Exercise Parameters Parameters Parameters   Cervical ROM      Scapular Retraction Rows 3 x 20 Single Arm Green    Low Rows 3 x 20 Red Single Arm Standing Red 3 x 20    With ER Standing Back Against Wall/Foam Roller 3 x 15  Standing Yellow 3 x 20    With ER Standing Back Against Wall/Foam Roller 3 x 15     Standing Low Row 3 x 20 Yellow   Bicep Curl Standing Back Against Wall and Foam Roller 3 x 15 3 lbs   Standing Back Against Wall and Foam Roller 3 x 15 3 lbs Hammer Curl Standing Back Against Wall and Foam Roller 3 x 15 3 lbs Standing Back Against Wall and Foam Roller 3 x 15 3 lbs   Isometrics      Rhythmic Stabilization       Shoulder ER 3 x 15 Sidelying AROM 3 x 15 Sidelying AROM Sidelying 3 x 15 AROM   Shoulder Flexion Standing, Foam Roll, 3 x 10 Flexion    Standing Foam Roll Wall Scaption 3 x 10    Standing Forward Punch 3 x 15 Red Standing, Foam Roll Cane Flexion 1 lb 3 x 10    Standing Foam Roll Cane Scaption 1 lb 3 x 10 Standing, Foam Roll 1 lb, 3 x 10 Flexion    Standing Foam Roll Wall 1 lbs Scaption 3 x 10   UBE 10 minutes FWD only Right Driving Motion 10 minutes FWD only Right Driving Motion 10 minutes FWD only Right Driving Motion   Shoulder Extension  To neutral prone 3 x 10 To neutral prone 3 x 10   Punch 2 lbs 3 x 20 2 lbs  3 x 10 2 lbs 3 x 20           Time spent with patient reviewing proper muscle recruitment and technique with exercises. Progression, Guidelines    MANUAL THERAPY: (8 minutes): Joint mobilization and Soft tissue mobilization was utilized and necessary because of the patient's restricted joint motion, painful spasm, loss of articular motion and restricted motion of soft tissue   Soft Tissue Mobilization Left Upper Trap, Levator Scapula, Infraspinatus, Biceps    MODALITIES: (0 minutes):      *  Cold Pack Therapy in order to provide analgesia, relieve muscle spasm and reduce inflammation and edema. (No Charge).     HEP: As above; handouts given to patient for all exercises. Treatment/Session Summary:    · Response to Treatment:  Patient reported no pain with treatment, but session was again modified to avoid use of right upper extremity. She did demonstrate fatigue and muscle weakness towards end of session. She would benefit from continued progression of postural awareness and strength to progress functionally. · Communication/Consultation:  HEP  · Equipment provided today:  None today  · Recommendations/Intent for next treatment session: Next visit will focus on mobility, flexibility, ROM, strength, muscle activation, per MD guidelines.     Total Treatment Billable Duration:  53 minutes  PT Patient Time In/Time Out  Time In: 1100  Time Out: [facility],

## 2022-05-18 ENCOUNTER — HOSPITAL ENCOUNTER (OUTPATIENT)
Dept: PHYSICAL THERAPY | Age: 77
Discharge: HOME | End: 2022-05-18
Payer: MEDICARE

## 2022-05-18 PROCEDURE — 97110 THERAPEUTIC EXERCISES: CPT

## 2022-05-18 PROCEDURE — 97140 MANUAL THERAPY 1/> REGIONS: CPT

## 2022-05-18 NOTE — PROGRESS NOTES
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 TREATMENT PLAN:  Effective Dates: 2022 TO 6/15/2022 (45 days). Frequency/Duration: 1-2 times a week for 45 Day(s) MEDICAL/REFERRING DIAGNOSIS:  History of left shoulder replacement [Z96.612]   DATE OF ONSET: Left Reverse Total Shoulder Arthroplasty 2022  REFERRING PHYSICIAN: Zeke Enciso MD MD Orders: Evaluate and Treat   Return MD Appointment: 3/22/2022     Pre-treatment Symptoms/Complaints:  Patient reports definite improvement. .    Pain: Initial: Pain Intensity 1: 1  Pain Location 1: Neck,Shoulder  Pain Orientation 1: Left  Post Session:  0/10   Medications Last Reviewed:  2022  Updated Objective Findings:  Palpation: Trace Left Levator Scapula, Upper Trapezius, Biceps, Deltoid Tightness, Tenderness, Trigger Points. TREATMENT:   THERAPEUTIC EXERCISE: (38 minutes):  Exercises per grid below to improve mobility, strength and coordination. Required moderate visual, verbal, manual and tactile cues to promote proper body alignment, promote proper body posture, promote proper body mechanics and promote proper body breathing techniques. Progressed resistance, range, repetitions and complexity of movement as indicated.      Date:  2022 Date:  22 Date:  22   Activity/Exercise Parameters Parameters Parameters   Cervical ROM      Scapular Retraction Rows 3 x 20 Single Arm Green    Low Rows 3 x 20 Red Single Arm Rows  3 x 20 single arm green    Low rows 3 x 20 single arm red      Standing Yellow 3 x 20    With ER Standing Back Against Wall/Foam Roller 3 x 15     Standing Low Row 3 x 20 Yellow   Bicep Curl Standing Back Against Wall and Foam Roller 3 x 15 3 lbs   Standing Back Against Wall and Foam Roller 3 x 15 3 lbs Hammer Curl Standing Back Against Wall and Foam Roller 3 x 15 3 lbs Standing Back Against Wall and Foam Roller 3 x 15 3 lbs   Isometrics      Rhythmic Stabilization       Shoulder ER 3 x 15 Sidelying AROM 3 x 15 Sidelying AROM Sidelying 3 x 15 AROM   Shoulder Flexion Standing, Foam Roll, 3 x 10 Flexion    Standing Foam Roll Wall Scaption 3 x 10    Standing Forward Punch 3 x 15 Red Standing, Foam Roll 3 x 10    Standing Foam Roll  3 x 10      Standing red 3 x 15 punch Standing, Foam Roll 1 lb, 3 x 10 Flexion    Standing Foam Roll Wall 1 lbs Scaption 3 x 10   UBE 10 minutes FWD only Right Driving Motion 10 minutes FWD only Right Driving Motion 10 minutes FWD only Right Driving Motion   Shoulder Extension  To neutral prone 3 x 10 To neutral prone 3 x 10   Punch 2 lbs 3 x 20  2 lbs 3 x 20           Time spent with patient reviewing proper muscle recruitment and technique with exercises. Progression, Guidelines    MANUAL THERAPY: (15 minutes): Joint mobilization and Soft tissue mobilization was utilized and necessary because of the patient's restricted joint motion, painful spasm, loss of articular motion and restricted motion of soft tissue   Soft Tissue Mobilization Left Upper Trap, Levator Scapula, Infraspinatus, Biceps    MODALITIES: (0 minutes):      *  Cold Pack Therapy in order to provide analgesia, relieve muscle spasm and reduce inflammation and edema. (No Charge). HEP: As above; handouts given to patient for all exercises. Treatment/Session Summary:    · Response to Treatment:  Patient reported no pain with treatment. Demonstrated improved functional strength and ROM.    · Communication/Consultation: HEP  · Equipment provided today:  None today  · Recommendations/Intent for next treatment session: Next visit will focus on mobility, flexibility, ROM, strength, muscle activation, per MD guidelines.     Total Treatment Billable Duration:  53 minutes  PT Patient Time In/Time Out  Time In: 2104  Time Out: Tim Yu PTA

## 2022-05-26 ENCOUNTER — APPOINTMENT (OUTPATIENT)
Dept: PHYSICAL THERAPY | Age: 77
End: 2022-05-26
Payer: MEDICARE

## 2022-05-26 ENCOUNTER — HOSPITAL ENCOUNTER (OUTPATIENT)
Dept: PHYSICAL THERAPY | Age: 77
Setting detail: RECURRING SERIES
Discharge: HOME | End: 2022-05-29
Payer: MEDICARE

## 2022-05-26 PROCEDURE — 97110 THERAPEUTIC EXERCISES: CPT

## 2022-05-26 NOTE — PROGRESS NOTES
Chandra Packer  : 1945  Primary: Merlyn Hastings Medicare Advantage  Secondary:  [address] at [facility]  [address], [address] [place] [address], [facility], [address] [place] [address]  [phone]   [phone]       OUTPATIENT PHYSICAL THERAPY: Daily Treatment Note 2022     ICD-10: Treatment Diagnosis: Pain in Left Shoulder (M25.512)  Generalized Muscle Weakness (M62.81)  History of left shoulder replacement (D08.723)              Precautions: Incontinence, OSTEOPOROSIS, Cervical Fusion , Lumbar Fusion   Allergies: Patient has no known allergies. TREATMENT PLAN:  Effective Dates: 2022 TO 6/15/2022 (45 days). Frequency/Duration: 1-2 times a week for 45 Day(s) MEDICAL/REFERRING DIAGNOSIS:  History of left shoulder replacement [Z96.612]   DATE OF ONSET: Left Reverse Total Shoulder Arthroplasty 2022  REFERRING PHYSICIAN: Lissette Cristobal MD MD Orders: Evaluate and Treat   Return MD Appointment: 3/22/2022   Pre-treatment Symptoms/Complaints:  Patient reports that she is ready to be discharged to her [facility]. She reports her left shoulder is doing really well.     Pain: Initial: Pain Intensity 1: 0  Pain Location 1: Neck,Shoulder  Pain Orientation 1: Left  Post Session:  0/10   Medications Last Reviewed:  2022  Updated Objective Findings:  See Discharge Summary.     TREATMENT:   THERAPEUTIC EXERCISE: (53 minutes):  Exercises per grid below to improve mobility, strength and coordination. Required moderate visual, verbal, manual and tactile cues to promote proper body alignment, promote proper body posture, promote proper body mechanics and promote proper body breathing techniques.   Progressed resistance, range, repetitions and complexity of movement as indicated.       Date:  2022 Date:  22 Date:  22   Activity/Exercise Parameters Parameters Parameters   Cervical ROM         Scapular Retraction Rows 3 x 20 Single Arm Green     Low Rows 3 x 20 Red Single Arm Rows  3 x 20 single arm green     Low rows 3 x 20 single arm red       Standing Yellow 3 x 20     With ER Standing Back Against Wall/Foam Roller 3 x 15      Standing Low Row 3 x 20 Yellow   Bicep Curl Standing Back Against Wall and Foam Roller 3 x 15 3 lbs   Standing Back Against Wall and Foam Roller 3 x 15 3 lbs Hammer Curl Standing Back Against Wall and Foam Roller 3 x 15 3 lbs    Isometrics         Rhythmic Stabilization          Shoulder ER 3 x 15 Sidelying AROM 3 x 15 Sidelying AROM Sidelying 3 x 15 AROM   Shoulder Flexion Standing, Foam Roll, 3 x 10 Flexion     Standing Foam Roll Wall Scaption 3 x 10     Standing Forward Punch 3 x 15 Red Standing, Foam Roll 3 x 10     Standing Foam Roll  3 x 10        Standing red 3 x 15 punch Standing Forward Punch 3 x 15 Red   UBE 10 minutes FWD only Right Driving Motion 10 minutes FWD only Right Driving Motion 10 minutes FWD only Right Driving Motion   Shoulder Extension   To neutral prone 3 x 10 3 x 10 Yellow   Punch 2 lbs 3 x 20   3 x 20   Upper Trapezius Stretch     4 x 30 sec   Levator Scapula Stretch   4 x 30 sec      Time spent with patient reviewing proper muscle recruitment and technique with exercises. Progression, Guidelines     MANUAL THERAPY: (0 minutes): Joint mobilization and Soft tissue mobilization was utilized and necessary because of the patient's restricted joint motion, painful spasm, loss of articular motion and restricted motion of soft tissue  · None Today     MODALITIES: (0 minutes):      *  Cold Pack Therapy in order to provide analgesia, relieve muscle spasm and reduce inflammation and edema. (No Charge).     HEP: As above; handouts given to patient for all exercises.     Treatment/Session Summary:    · Response to Treatment:  Patient no pain with treatment. Patient has demonstrated improved ROM, mobility, flexibility, muscle activation, strength, and function. Patient will be discharged to her HEP. Her HEP was reviewed and progressed. · Communication/Consultation:  HEP  · Equipment provided today: Red and Green   · Recommendations: Discharge to HEP.     Total Treatment Billable Duration:  53 minutes  PT Patient Time In/Time Out  Time In: 1000  Time Out: [facility], PT, DPT, TPS

## 2022-05-26 NOTE — THERAPY DISCHARGE
Page Packer  : 1945  Primary: Varinder Gaster Medicare Advantage  Secondary:  [address] at [facility]  [address] [place], [address] [place] [address], [place], [address] [place] [address]  [phone]   [phone]       OUTPATIENT PHYSICAL Shreyas Salgado 2022    ICD-10: Treatment Diagnosis: Pain in Left Shoulder (M25.512)  Generalized Muscle Weakness (M62.81)  History of left shoulder replacement (C83.352)              Precautions: Incontinence, OSTEOPOROSIS, Cervical Fusion , Lumbar Fusion   Allergies: Patient has no known allergies. TREATMENT PLAN:  Effective Dates: 2022 TO 6/15/2022 (45 days). Frequency/Duration: 1-2 times a week for 45 Day(s) MEDICAL/REFERRING DIAGNOSIS:  History of left shoulder replacement [Z96.612]   DATE OF ONSET: Left Reverse Total Shoulder Arthroplasty 2022  REFERRING PHYSICIAN: Enedina Strong MD MD Orders: Evaluate and Treat   Return MD Appointment: 3/22/2022      INITIAL ASSESSMENT:  Ms. Packer is a 65 y.o. female presenting to physical therapy with complaints of severe left shoulder pain and swelling status post left reverse shoulder total shoulder arthroplasty on 2022. Patient reports that during surgery she suffered a lung puncture, but has not really had any difficulty. She does reports having periods since surgery of intense pain. She reports having one episode last week after a home health physical therapy visit and yesterday after reaching to scratch her right arm. She reports difficulty sleeping at various points, reports at times having to sleep in her recliner and other times being able to sleep in her bed. She reports last year she started having trouble with her neck, shoulder and arm. She reports getting diagnosed with pinched nerve in her neck and per patient it was determined that she needed to have neck surgery.  She reports having a C3-4 fusion, 2021, that per patient helped with numbness and tingling her arm and some of her pain, but she continued to have left shoulder pain. She reports that she has a history of bilateral rotator cuff tears. She reports that she is left handed and is extremely eager to return back to full independence and all normal daily activities. Patient presents with increased pain, decreased strength, decreased ROM, decreased flexibility, impaired gait, impaired posture, impaired overhead reach, impaired transfer ability, decreased activity tolerance, and overall impaired functional mobility. Patient is a good candidate for skilled physical therapy interventions to include manual therapy, therapeutic exercise, balance training, gait training, transfer training, postural re-education, body mechanics training, and pain modalities and trigger point dry needling (TPDN) as needed.      DISCHARGE REPORT:  Ms. Gricelda Simpson is a 65 y.o. female who presented initially to physical therapy with complaints of severe left shoulder pain and swelling status post left reverse shoulder total shoulder arthroplasty on 2/14/2022. Patient has been seen for 21 sessions of physical therapy from 3/7/2022 to 5/4/2022. She reports feeling % better with all daily activities since starting therapy. She has demonstrated improved ROM, strength, muscle activation, decreased pain, and improved function. Patient has achieved all established goals and will be discharged at this time per patient request to her HEP. Her HEP was reviewed and progressed with patient.       PROBLEM LIST (Impacting functional limitations):  1. Decreased Strength  2. Decreased ADL/Functional Activities  3. Decreased Transfer Abilities  4. Decreased Ambulation Ability/Technique  5. Increased Pain  6. Decreased Activity Tolerance  7. Decreased Pacing Skills  8. Decreased Work Simplification/Energy Conservation Techniques  9. Increased Fatigue  10. Decreased Flexibility/Joint Mobility  11. Edema/Girth  12. Decreased Knowledge of Precautions  13.  Decreased [place] with Home Exercise Program INTERVENTIONS PLANNED: (Treatment may consist of any combination of the following)  1. Bed Mobility  2. Cold  3. Cryotherapy  4. Electrical Stimulation  5. Family Education  6. Gait Training  7. Heat  8. Home Exercise Program (HEP)  9. Manual Therapy  10. Neuromuscular Re-education/Strengthening  11. Range of Motion (ROM)  12. Therapeutic Activites  13. Therapeutic Exercise/Strengthening  14. Transcutaneous Electrical Nerve Stimulation (TENS)  15. Transfer Training  16. Ultrasound (US)  17. Trigger Point Dry Needling (TPDN)      GOALS: (Goals have been discussed and agreed upon with patient.)  Short-Term Functional Goals: Time Frame: 5/4/2022 TO 6/4/2022  1. Patient demonstrates independence with home exercise program without verbal cueing provided by therapist. -MET  2. Patient will report no more than 2/10 left shoulder pain at rest in order to demonstrate improved self pain control and tolerance and improve quality of sleep. -MET  3. Patient will be educated in and demonstrate improved upright posture to assist with return to full independence with dressing and bathing. -MET  4. Patient will improve shoulder flexion to 115 degrees to assist with doing her hair without limitations independently. -MET  5. Patient will improve shoulder external rotation to 45 degrees to assist with toileting. -MET  Discharge Goals: Time Frame: 5/4/2022 TO 6/15/2022  1. Patient will improve shoulder flexion to 120 degrees to assist with reaching overhead. -MET  2. Patient will improve shoulder strength to 4/5 to assist with lifting and carrying around home and to be able to go shopping. -MET  3. Patient will improve shoulder IR to 45 degrees to assist reaching behind her back. -MET  4. Patient will improve Disability of the Arm, Shoulder, and Hand score to 27/55 from 47/55. -MET     Outcome Measure:    Tool Used: Disabilities of the Arm, Shoulder and Hand (DASH) Questionnaire - Quick Version  Score:  Initial: 47/55  Most Recent: 15/55 (Date: 5/4/2022 )   Interpretation of Score: The DASH is designed to measure the activities of daily living in person's with upper extremity dysfunction or pain. Each section is scored on a 1-5 scale, 5 representing the greatest disability. The scores of each section are added together for a total score of 55.     UPDATED OBJECTIVE MEASURES:    Patient denies any headaches, changes in vision, dizziness, vertigo, nausea, drop attacks, black outs, tinnitus, dysphagia, dysarthria, LE symptoms or bowel/bladder dysfunction.     Observation/Orthostatic Postural Assessment:          Patient presents with forward head, rounded shoulders, protracted shoulders. Patient demonstrated increased left scapular elevation. Patient presented with guarding left UE posture. Patient demonstrated little to no left arm swing. Palpation:          Patient presented with severe bilateral Cervical/Thoracic Paraspinal, Suboccipital, Scalene, left Upper Trapezius, Levator Scapula, Parascapular, Posterior Cuff, Deltoid, Biceps, Incision tenderness, tightness, trigger points. ROM:          NT = Not Tested  AROM/ PROM Left (degrees) Right (degrees)   Shoulder Flexion 162 (from Passive 89 degrees)  152   Shoulder Abduction 160 (from NT) 150   Shoulder Internal Rotation  60 (from NT) 60   Functional Internal Rotation T10 (from NT) T2   Shoulder External Rotation 73 (from Passive 30 degrees) 80   Functional External Rotation T2 (from NT) T10    Cervical AROM: Flexion 50 degrees, Extension 40 degrees, Side Bending Right 10 degrees, Left 30 degrees, Rotation Right 40 degrees Left 70 degrees. Strength:             Motion Tested Left   (*/5) Right  (*/5)   Shoulder Flexion 4+ (from 2) 3   Scaption 4+ (from 2) 3   Shoulder Abduction 4+ (from 2) 3   Shoulder Internal Rotation  4+ (from 2) 3   Shoulder External Rotation 4+ (from 2) 3   Shoulder Internal Rotation   (90 abduction) 4+ (from 2) 3 Shoulder External Rotation   (90 abduction) 4+ (from 2) 3   Elbow Flexion 4+ (from 3) 4   Elbow Extension 4+ (from 3) 4   Wrist Flexion 4+ (from 3) 4   Wrist Extension 4+ (from 3) 4      Special Tests:  Patient demonstrated well heal incision. Patient denied any signs or symptoms of deep vein thrombosis or infection. Passive Accessory Motion:         Patient demonstrated limited scapular mobility bilaterally. Neurological Screen:              Myotomes: Key muscle strength testing through bilateral UE is intact, deficits noted above. Dermatomes: Sensation to light touch for bilateral UE is intact from C5 to T1. Reflexes:        Biceps (C5): Intact and Equal        Brachioradialis (C6): Intact and Equal        Triceps (C7): Intact and Equal     Functional Mobility:         Gait/Ambulation:  Patient demonstrated no left UE arm swing, kept arm against body. Patient demonstrated antalgic gait pattern. Transfers:  Patient required minimal assistance for all transfers. Balance:          Patient denied any falls or trouble with her balance. Reason for Services/Other Comments:  · Patient will be discharged to her HEP per her request. Her HEP was reviewed and progressed with patient.        Rehabilitation Potential For Stated Goals: Good  Regarding Page Morgan's therapy, I certify that the treatment plan above will be carried out by a therapist or under their direction.   Thank you for this referral,  Edgar Johnson PT     Referring Physician Signature: Enedina Strong MD

## 2022-06-01 ENCOUNTER — APPOINTMENT (OUTPATIENT)
Dept: PHYSICAL THERAPY | Age: 77
End: 2022-06-01

## 2022-06-08 ENCOUNTER — APPOINTMENT (OUTPATIENT)
Dept: PHYSICAL THERAPY | Age: 77
End: 2022-06-08

## 2022-12-20 ENCOUNTER — HOSPITAL ENCOUNTER (OUTPATIENT)
Dept: MAMMOGRAPHY | Age: 77
Discharge: HOME | End: 2022-12-23
Payer: MEDICARE

## 2022-12-20 DIAGNOSIS — Z78.0 POSTMENOPAUSAL: ICD-10-CM

## 2022-12-20 PROCEDURE — 77080 DXA BONE DENSITY AXIAL: CPT

## 2023-03-31 ENCOUNTER — HOSPITAL ENCOUNTER (OUTPATIENT)
Dept: PHYSICAL THERAPY | Age: 78
Setting detail: RECURRING SERIES
End: 2023-03-31
Payer: MEDICARE

## 2023-03-31 PROCEDURE — 97110 THERAPEUTIC EXERCISES: CPT

## 2023-03-31 PROCEDURE — 97140 MANUAL THERAPY 1/> REGIONS: CPT

## 2023-03-31 PROCEDURE — 97161 PT EVAL LOW COMPLEX 20 MIN: CPT

## 2023-03-31 ASSESSMENT — PAIN SCALES - GENERAL: PAINLEVEL_OUTOF10: 2

## 2023-03-31 NOTE — THERAPY EVALUATION
status; Decreased ROM; Decreased body mechanics; Decreased tolerance to work activity; Decreased strength; Decreased endurance; Decreased balance; Decreased coordination; Increased pain; Decreased posture     Therapy Prognosis:   Therapy Prognosis: Good     Initial Assessment Complexity:   Decision Making: Low Complexity    PLAN   Effective Dates: 3/31/2023 TO Plan of Care/Certification Expiration Date: 05/30/23   Frequency/Duration: Plan Frequency: 1-2 times per week for 60 days   Interventions Planned (Treatment may consist of any combination of the following):    Current Treatment Recommendations: Strengthening; ROM; Balance training; Functional mobility training; Transfer training; ADL/Self-care training; IADL training; Endurance training; Gait training; Stair training; Neuromuscular re-education; Manual; Pain management; Return to work related activity; Home exercise program; Safety education & training; Patient/Caregiver education & training; Equipment evaluation, education, & procurement; Modalities; Positioning; Dry needling; Therapeutic activities     GOALS: (Goals have been discussed and agreed upon with patient.)  Short-Term Functional Goals: Time Frame: 3/31/2023 to 4/30/2023  Patient demonstrates independence with home exercise program without verbal cueing provided by therapist.   Patient will demonstrate an improvement in strength to 4+ to assist with return to dressing without limitations. Patient will be educated in and demonstrate proper squat lift technique in order to reduce stress on back and hips, improve safety, and reduce risk of injury. Patient will improve hip flexion to 115 degrees to assist with dressing without compensatory patterns. Discharge Goals: Time Frame: 3/31/2023 to 5/30/2023  Patient will improve hip flexion to 120 degrees to assist with full normal pain free bending and lifting.    Patient will improve strength to 5/5 to assist with return to walking and stairs without limitations. Patient will improve ER to 40 degrees to assist with sitting. Patient will improve Lower Extremity Functional Scale score to 56/80 from 41/80. Outcome Measure: Tool Used: Lower Extremity Functional Scale (LEFS)  Score:  Initial: 41/80 Most Recent: X/80 (Date: -- )   Interpretation of Score: 20 questions each scored on a 5 point scale with 0 representing \"extreme difficulty or unable to perform\" and 4 representing \"no difficulty\". The lower the score, the greater the functional disability. 80/80 represents no disability. Minimal detectable change is 9 points. Medical Necessity:   > Patient is expected to demonstrate progress in strength, range of motion, balance, coordination, and functional technique to decrease pain and improved full return to all prior function activities. > Skilled intervention continues to be required due to increased pain and decreased mobility, flexibility, ROM, strength, and function. Reason For Services/Other Comments:  > Patient continues to require skilled intervention due to limited function abilities and increasing complexity of exercises. Total Duration:  Time In: 0900  Time Out: 1000    Regarding Trixie Morgan's therapy, I certify that the treatment plan above will be carried out by a therapist or under their direction.   Thank you for this referral,  Radu Cabrera, PT     Referring Physician Signature: Valentino Metz MD _______________________________ Date _____________        Post Session Pain  Charge Capture  PT Visit Info MD Raffi Munoz

## 2023-03-31 NOTE — PROGRESS NOTES
Shin Packer  : 1945  Primary:  (Self-Pay)  Secondary:  [address] @ [address] [place] [address]  Phone: [phone]  Fax: [phone] Plan Frequency: 1-2 times per week for 60 days  Plan of Care/Certification Expiration Date: 23    >PT Visit Info:  Plan Frequency: 1-2 times per week for 60 days  Plan of Care/Certification Expiration Date: 23    Visit Count:  1    OUTPATIENT PHYSICAL THERAPY:OP NOTE TYPE: Treatment Note 3/31/2023       Episode  }Appt Desk             Treatment Diagnosis:  Right Hip Pain (M25.551)  Generalized Muscle Weakness (M62.81)  Difficulty in walking, Not elsewhere classified (R26.2)  Other abnormalities of gait and mobility (R26.89)  Other Low Back Pain (M54.59)  Medical/Referring Diagnosis:  Pain in right hip [M25.551]  Referring Physician:  Linus Huerta MD MD Orders:  PT Eval and Treat   Date of Onset:  Onset Date: 23   Allergies:   Patient has no allergy information on record. Restrictions/Precautions:  Osteoporosis, Left TSA, Lumbar Fusion  Interventions Planned (Treatment may consist of any combination of the following):    Current Treatment Recommendations: Strengthening; ROM; Balance training; Functional mobility training; Transfer training; ADL/Self-care training; IADL training; Endurance training; Gait training; Stair training; Neuromuscular re-education; Manual; Pain management; Return to work related activity; Home exercise program; Safety education & training; Patient/Caregiver education & training; Equipment evaluation, education, & procurement; Modalities; Positioning; Dry needling; Therapeutic activities     >Subjective Comments:   Patient reports that the injection in her hip helped decrease her pain tremendously.  She reports prior to injection pain was a 8/10.   >Initial:     2/10>Post Session:       0/10  Medications Last Reviewed:  3/31/2023  Updated Objective Findings:  See evaluation note from today  Treatment   THERAPEUTIC EXERCISE: (23 minutes):    Exercises per grid below to improve mobility, strength, balance, and coordination. Required moderate visual, verbal, manual, and tactile cues to promote proper body alignment, promote proper body posture, promote proper body mechanics, and promote proper body breathing techniques. Progressed resistance, range, repetitions, and complexity of movement as indicated. Date:  3/31/2023 Date:   Date:     Activity/Exercise Parameters Parameters Parameters   Hip Abduction Knee Fall Outs 3 x 10 Each Leg Red     Clams 3 x 20 Red     Hip Flexion Supine 3 x 10     Transfers Sit to Supine/Supine to Sit x 1     Hip PROM Hip All Directions 6 minutes                   Time spent with patient reviewing proper muscle recruitment and technique with exercises. MANUAL THERAPY: (15 minutes):   Joint mobilization and Soft tissue mobilization was utilized and necessary because of the patient's restricted joint motion, painful spasm, loss of articular motion, and restricted motion of soft tissue. Long Axis Traction    MODALITIES: (0 minutes):        None Today      HEP: As above; handouts given to patient for all exercises. Treatment/Session Summary:    >Treatment Assessment:   Patient reported no hip pain after treatment. She demonstrated significant gluteal weakness and right hip atrophy. She required verbal cues for posture and form. She would benefit from continued graded progression of muscle activation and strength. Communication/Consultation:   HEP  Equipment provided today:  Red Thera Band  Recommendations/Intent for next treatment session: Next visit will focus on mobility, flexibility, ROM, strength, balance, gait, and function. >Total Treatment Billable Duration:  58 minutes, 20 minutes evaluation, 23 minutes therapeutic exercise, 15 minutes manual therapy.   Time In: 0900  Time Out: [address] Hawkins, PT

## 2023-04-05 ENCOUNTER — HOSPITAL ENCOUNTER (OUTPATIENT)
Dept: PHYSICAL THERAPY | Age: 78
Setting detail: RECURRING SERIES
Discharge: HOME | End: 2023-04-08
Payer: MEDICARE

## 2023-04-05 PROCEDURE — 97110 THERAPEUTIC EXERCISES: CPT

## 2023-04-05 PROCEDURE — 97140 MANUAL THERAPY 1/> REGIONS: CPT

## 2023-04-05 ASSESSMENT — PAIN SCALES - GENERAL: PAINLEVEL_OUTOF10: 0

## 2023-04-05 NOTE — PROGRESS NOTES
Chris Packer  : 1945  Primary:  (Self-Pay)  Secondary:  [address] @ [address] [facility] [address]  Phone: [phone]  Fax: [phone] Plan Frequency: 1-2 times per week for 60 days  Plan of Care/Certification Expiration Date: 23      >PT Visit Info:  Plan Frequency: 1-2 times per week for 60 days  Plan of Care/Certification Expiration Date: 23      Visit Count:  2    OUTPATIENT PHYSICAL THERAPY:OP NOTE TYPE: Treatment Note 2023       Episode  }Appt Desk             Treatment Diagnosis:  Right Hip Pain (M25.551)  Generalized Muscle Weakness (M62.81)  Difficulty in walking, Not elsewhere classified (R26.2)  Other abnormalities of gait and mobility (R26.89)  Other Low Back Pain (M54.59)  Medical/Referring Diagnosis:  Pain in right hip [M25.551]  Referring Physician:  Kymberly Lopez MD MD Orders:  PT Eval and Treat   Date of Onset:  Onset Date: 23     Allergies:   Patient has no allergy information on record. Restrictions/Precautions:  Osteoporosis, Left TSA, Lumbar Fusion  Interventions Planned (Treatment may consist of any combination of the following):    Current Treatment Recommendations: Strengthening; ROM; Balance training; Functional mobility training; Transfer training; ADL/Self-care training; IADL training; Endurance training; Gait training; Stair training; Neuromuscular re-education; Manual; Pain management; Return to work related activity; Home exercise program; Safety education & training; Patient/Caregiver education & training; Equipment evaluation, education, & procurement; Modalities; Positioning; Dry needling; Therapeutic activities     >Subjective Comments:   Patient reports that she is working on her HEP.  She reports it is helping.   >Initial:     0/10>Post Session:       0/10  Medications Last Reviewed:  2023  Updated Objective Findings:   Ambulation:  Right Knee Genu Valgus, Hip IR

## 2023-04-13 ENCOUNTER — HOSPITAL ENCOUNTER (OUTPATIENT)
Dept: PHYSICAL THERAPY | Age: 78
Setting detail: RECURRING SERIES
Discharge: HOME | End: 2023-04-16
Payer: MEDICARE

## 2023-04-13 PROCEDURE — 97110 THERAPEUTIC EXERCISES: CPT

## 2023-04-13 ASSESSMENT — PAIN SCALES - GENERAL: PAINLEVEL_OUTOF10: 0

## 2023-04-17 ENCOUNTER — HOSPITAL ENCOUNTER (OUTPATIENT)
Dept: PHYSICAL THERAPY | Age: 78
Setting detail: RECURRING SERIES
Discharge: HOME | End: 2023-04-20
Payer: MEDICARE

## 2023-04-17 PROCEDURE — 97110 THERAPEUTIC EXERCISES: CPT

## 2023-04-17 ASSESSMENT — PAIN SCALES - GENERAL: PAINLEVEL_OUTOF10: 0

## 2023-04-17 NOTE — PROGRESS NOTES
motion, and restricted motion of soft tissue. Long Axis Traction  Soft Tissue Mobilization Bilateral Gluteals and Thoracolumbar Paraspinals    MODALITIES: (0 minutes):        None Today      HEP: As above; handouts given to patient for all exercises. Treatment/Session Summary:    >Treatment Assessment:   Patient reported low back symptom relief with SKTC. She required verbal cues for posture, form, and mechanics. She required cues for neutral spine and lower extremity posture. Patients HEP was reviewed and progressed. She would benefit from continued progression of strength and stability to progress functionally. Communication/Consultation:   HEP  Equipment provided today: None Today  Recommendations/Intent for next treatment session: Next visit will focus on mobility, flexibility, ROM, strength, balance, gait, and function.     >Total Treatment Billable Duration:  53 minutes  Time In: 0700  Time Out: 0800    Antonietta Johnson PT       Charge Capture  }Post Session Pain  PT Visit Info  [facility] Portal  MD Guidelines  Scanned Media  Benefits  MyChart    Future Appointments   Date Time Provider Mykel Han   4/18/2023  7:00 AM CLAUDIO Blanton   5/8/2023  7:00 AM CLAUDIO Blanton

## 2023-04-18 ENCOUNTER — HOSPITAL ENCOUNTER (OUTPATIENT)
Dept: PHYSICAL THERAPY | Age: 78
Setting detail: RECURRING SERIES
Discharge: HOME | End: 2023-04-21
Payer: MEDICARE

## 2023-04-18 PROCEDURE — 97110 THERAPEUTIC EXERCISES: CPT

## 2023-04-18 ASSESSMENT — PAIN SCALES - GENERAL: PAINLEVEL_OUTOF10: 0

## 2023-04-18 NOTE — PROGRESS NOTES
articular motion, and restricted motion of soft tissue. Long Axis Traction  Soft Tissue Mobilization Bilateral Gluteals and Thoracolumbar Paraspinals    MODALITIES: (0 minutes):        None Today      HEP: As above; handouts given to patient for all exercises. Treatment/Session Summary:    >Treatment Assessment:   Patient reported no pain with treatment. Patient continues to have strength deficits, right hip greater then left hip. She required verbal cues for posture, form, and mechanics, specifically right hip flexion and extension. Patient would benefit from continued progression of strength and balance both statically and dynamically to progress function and safety. Communication/Consultation:   HEP  Equipment provided today: None Today  Recommendations/Intent for next treatment session: Next visit will focus on mobility, flexibility, ROM, strength, balance, gait, and function.     >Total Treatment Billable Duration:  53 minutes  Time In: 8664  Time Out: 0802    Nusrat Fung, PT       Charge Capture  }Post Session Pain  PT Visit Info  [facility] Portal  MD Guidelines  Scanned Media  Benefits  MyChart    Future Appointments   Date Time Provider Mykel Han   5/8/2023  7:00 AM Nusrat Fung, PT [facility]

## 2023-05-08 ENCOUNTER — HOSPITAL ENCOUNTER (OUTPATIENT)
Dept: PHYSICAL THERAPY | Age: 78
Setting detail: RECURRING SERIES
Discharge: HOME | End: 2023-05-11
Payer: MEDICARE

## 2023-05-08 PROCEDURE — 97110 THERAPEUTIC EXERCISES: CPT

## 2023-05-08 ASSESSMENT — PAIN SCALES - GENERAL: PAINLEVEL_OUTOF10: 0

## 2023-05-08 NOTE — THERAPY RECERTIFICATION
Lux [place]  : 1945  Primary:  (Self-Pay)  Secondary:  [address] @ [address] [facility] [address]  Phone: [phone]  Fax: [phone] Plan Frequency: 1-2 times per week for 60 days  Plan of Care/Certification Expiration Date: 23      PT Visit Info:  Plan Frequency: 1-2 times per week for 60 days  Plan of Care/Certification Expiration Date: 23      Visit Count:  8                OUTPATIENT PHYSICAL THERAPY:             OP NOTE TYPE: Recertification [id number]               Episode (Right Hip Pain and Weakness) Appt Desk         Treatment Diagnosis:  Right Hip Pain (M25.551)  Generalized Muscle Weakness (M62.81)  Difficulty in walking, Not elsewhere classified (R26.2)  Other abnormalities of gait and mobility (R26.89)  Other Low Back Pain (M54.59)  Medical/Referring Diagnosis:  Pain in right hip [M25.551]  Referring Physician:  Mag Steen MD MD Orders:  PT Eval and Treat   Return MD Appt:  TBD  Date of Onset:  Onset Date: 23      Allergies:  Patient has no allergy information on record. Restrictions/Precautions:     Osteoporosis, Left TSA, Lumbar Fusion      Medications Last Reviewed:  2023      OBJECTIVE   Patient denies any LE paresthesia. Patient denies any increase of symptoms with cough, sneeze or valsalva. Patient denies any saddle paresthesia or bowel/bladder deficits. Observation/Orthostatic Postural Assessment:          Patient presented with mild increased right knee genu valgus and decreased lumbar lordosis, increased anterior pelvic tilt, decreased right LE weight bearing. Palpation:          Patient presented with trace to mild (from moderate to severe) right gluteal tenderness and SI tenderness.     ROM:            AROM/ PROM Left (degrees) Right (degrees)   Hip Flexion 125 120 (from 110)   Hip External Rotation (ER) 40 38 (from 32)   Hip Internal Rotation (IR) 35 35   Hip

## 2023-05-08 NOTE — PROGRESS NOTES
Sonny Packer  : 1945  Primary:  (Self-Pay)  Secondary:  [address] @ [address] [place] [address]  Phone: [phone]  Fax: [phone] Plan Frequency: 1-2 times per week for 60 days  Plan of Care/Certification Expiration Date: 23      >PT Visit Info:  Plan Frequency: 1-2 times per week for 60 days  Plan of Care/Certification Expiration Date: 23      Visit Count:  8    OUTPATIENT PHYSICAL THERAPY:OP NOTE TYPE: Treatment Note 2023       Episode  }Appt Desk             Treatment Diagnosis:  Right Hip Pain (M25.551)  Generalized Muscle Weakness (M62.81)  Difficulty in walking, Not elsewhere classified (R26.2)  Other abnormalities of gait and mobility (R26.89)  Other Low Back Pain (M54.59)  Medical/Referring Diagnosis:  Pain in right hip [M25.551]  Referring Physician:  Jefry Howard MD MD Orders:  PT Eval and Treat   Date of Onset:  Onset Date: 23     Allergies:   Patient has no allergy information on record. Restrictions/Precautions:  Osteoporosis, Left TSA, Lumbar Fusion  Interventions Planned (Treatment may consist of any combination of the following):    Current Treatment Recommendations: Strengthening; ROM; Balance training; Functional mobility training; Transfer training; ADL/Self-care training; IADL training; Endurance training; Gait training; Stair training; Neuromuscular re-education; Manual; Pain management; Return to work related activity; Home exercise program; Safety education & training; Patient/Caregiver education & training; Equipment evaluation, education, & procurement; Modalities; Positioning; Dry needling; Therapeutic activities     >Subjective Comments:   Patient reports that her trip went well, until she started to experience stomach virus. She reports she was not able to do her HEP while sick. She reports still had some intermittent pain and difficulty with stairs and steps.    >Initial:

## 2023-05-17 ENCOUNTER — HOSPITAL ENCOUNTER (OUTPATIENT)
Dept: PHYSICAL THERAPY | Age: 78
Setting detail: RECURRING SERIES
Discharge: HOME | End: 2023-05-20
Payer: MEDICARE

## 2023-05-17 PROCEDURE — 97110 THERAPEUTIC EXERCISES: CPT

## 2023-05-17 ASSESSMENT — PAIN SCALES - GENERAL: PAINLEVEL_OUTOF10: 0

## 2023-05-17 NOTE — PROGRESS NOTES
Senthil Packer  : 1945  Primary:  (Self-Pay)  Secondary:  [address] @ [address] [facility] [address]  Phone: [phone]  Fax: [phone] Plan Frequency: 1-2 times per week for 60 days  Plan of Care/Certification Expiration Date: 23      >PT Visit Info:  Plan Frequency: 1-2 times per week for 60 days  Plan of Care/Certification Expiration Date: 23      Visit Count:  9    OUTPATIENT PHYSICAL THERAPY:OP NOTE TYPE: Treatment Note 2023       Episode  }Appt Desk             Treatment Diagnosis:  Right Hip Pain (M25.551)  Generalized Muscle Weakness (M62.81)  Difficulty in walking, Not elsewhere classified (R26.2)  Other abnormalities of gait and mobility (R26.89)  Other Low Back Pain (M54.59)  Medical/Referring Diagnosis:  Pain in right hip [M25.551]  Referring Physician:  Dony Flores MD MD Orders:  PT Eval and Treat   Date of Onset:  Onset Date: 23     Allergies:   Patient has no allergy information on record. Restrictions/Precautions:  Osteoporosis, Left TSA, Lumbar Fusion  Interventions Planned (Treatment may consist of any combination of the following):    Current Treatment Recommendations: Strengthening; ROM; Balance training; Functional mobility training; Transfer training; ADL/Self-care training; IADL training; Endurance training; Gait training; Stair training; Neuromuscular re-education; Manual; Pain management; Return to work related activity; Home exercise program; Safety education & training; Patient/Caregiver education & training; Equipment evaluation, education, & procurement; Modalities; Positioning; Dry needling; Therapeutic activities     >Subjective Comments:   Patient reports that she continues to improve.     >Initial:     0/10>Post Session:       0/10  Medications Last Reviewed:  2023  Updated Objective Findings:   Ambulation:  Right Knee Genu Valgus, Hip IR Increase, Decreased Gluteal

## 2023-05-22 ENCOUNTER — HOSPITAL ENCOUNTER (OUTPATIENT)
Dept: PHYSICAL THERAPY | Age: 78
Setting detail: RECURRING SERIES
Discharge: HOME | End: 2023-05-25
Payer: MEDICARE

## 2023-05-22 PROCEDURE — 97110 THERAPEUTIC EXERCISES: CPT

## 2023-05-22 ASSESSMENT — PAIN SCALES - GENERAL: PAINLEVEL_OUTOF10: 0

## 2023-05-22 NOTE — PROGRESS NOTES
William Packer  : 1945  Primary:  (Self-Pay)  Secondary:  [address] @ [address] [place] [address]  Phone: [phone]  Fax: [phone] Plan Frequency: 1-2 times per week for 60 days  Plan of Care/Certification Expiration Date: 23      >PT Visit Info:  Plan Frequency: 1-2 times per week for 60 days  Plan of Care/Certification Expiration Date: 23      Visit Count:  10    OUTPATIENT PHYSICAL THERAPY:OP NOTE TYPE: Treatment Note 2023       Episode  }Appt Desk             Treatment Diagnosis:  Right Hip Pain (M25.551)  Generalized Muscle Weakness (M62.81)  Difficulty in walking, Not elsewhere classified (R26.2)  Other abnormalities of gait and mobility (R26.89)  Other Low Back Pain (M54.59)  Medical/Referring Diagnosis:  Pain in right hip [M25.551]  Referring Physician:  Lisandra Marrero MD MD Orders:  PT Eval and Treat   Date of Onset:  Onset Date: 23     Allergies:   Patient has no allergy information on record. Restrictions/Precautions:  Osteoporosis, Left TSA, Lumbar Fusion  Interventions Planned (Treatment may consist of any combination of the following):    Current Treatment Recommendations: Strengthening; ROM; Balance training; Functional mobility training; Transfer training; ADL/Self-care training; IADL training; Endurance training; Gait training; Stair training; Neuromuscular re-education; Manual; Pain management; Return to work related activity; Home exercise program; Safety education & training; Patient/Caregiver education & training; Equipment evaluation, education, & procurement; Modalities; Positioning; Dry needling; Therapeutic activities     >Subjective Comments:   Patient reports that she is feeling good.    >Initial:     0/10>Post Session:       0/10  Medications Last Reviewed:  2023  Updated Objective Findings:   Ambulation:  Right Knee Genu Valgus, Hip IR Increase, Decreased Gluteal

## 2023-06-01 ENCOUNTER — HOSPITAL ENCOUNTER (OUTPATIENT)
Dept: PHYSICAL THERAPY | Age: 78
Setting detail: RECURRING SERIES
Discharge: HOME | End: 2023-06-04
Payer: MEDICARE

## 2023-06-01 PROCEDURE — 97140 MANUAL THERAPY 1/> REGIONS: CPT

## 2023-06-01 PROCEDURE — 97110 THERAPEUTIC EXERCISES: CPT

## 2023-06-01 ASSESSMENT — PAIN SCALES - GENERAL: PAINLEVEL_OUTOF10: 0

## 2024-04-08 ENCOUNTER — OFFICE VISIT (OUTPATIENT)
Dept: ORTHOPEDIC SURGERY | Age: 79
End: 2024-04-08
Payer: MEDICARE

## 2024-04-08 DIAGNOSIS — M79.641 PAIN IN BOTH HANDS: Primary | ICD-10-CM

## 2024-04-08 DIAGNOSIS — M72.0 DUPUYTREN'S CONTRACTURE OF RIGHT HAND: ICD-10-CM

## 2024-04-08 DIAGNOSIS — M72.0 DUPUYTREN'S CONTRACTURE OF LEFT HAND: ICD-10-CM

## 2024-04-08 DIAGNOSIS — M18.12 ARTHRITIS OF CARPOMETACARPAL (CMC) JOINT OF LEFT THUMB: ICD-10-CM

## 2024-04-08 DIAGNOSIS — M79.642 PAIN IN BOTH HANDS: Primary | ICD-10-CM

## 2024-04-08 DIAGNOSIS — M18.11 PRIMARY OSTEOARTHRITIS OF FIRST CARPOMETACARPAL JOINT OF RIGHT HAND: ICD-10-CM

## 2024-04-08 PROCEDURE — 99214 OFFICE O/P EST MOD 30 MIN: CPT | Performed by: NURSE PRACTITIONER

## 2024-04-08 PROCEDURE — L3924 HFO WITHOUT JOINTS PRE OTS: HCPCS | Performed by: NURSE PRACTITIONER

## 2024-04-08 PROCEDURE — 1123F ACP DISCUSS/DSCN MKR DOCD: CPT | Performed by: NURSE PRACTITIONER

## 2024-04-08 RX ORDER — METHYLPREDNISOLONE ACETATE 40 MG/ML
40 INJECTION, SUSPENSION INTRA-ARTICULAR; INTRALESIONAL; INTRAMUSCULAR; SOFT TISSUE ONCE
Status: SHIPPED | OUTPATIENT
Start: 2024-04-08

## 2024-04-08 RX ORDER — MELOXICAM 15 MG/1
15 TABLET ORAL DAILY
Qty: 28 TABLET | Refills: 0 | Status: SHIPPED | OUTPATIENT
Start: 2024-04-08 | End: 2024-05-06

## 2024-04-08 NOTE — PROGRESS NOTES
Orthopaedic Hand Surgery Note    Name: Angle Morgan  YOB: 1945  Gender: female  MRN: [id number]    CC: New patient referred for bilateral thumb pain, left worse than right    HPI: Patient is a 79 y.o. female with a chief complaint of right thumb pain and weakness, left worse than right. The patient complains of increased pain with activities involving pinch and  (ex/ opening jars, turning car key, buttons). The symptoms have been present for over 1 year. Evaluation to date has included PCP. Treatment to date has included none.  She also notes some lumps on the palmar aspect of her right hand.  She says they are not painful.      ROS/Meds/PSH/PMH/FH/SH: I personally reviewed the patients standard intake form.  Pertinents are discussed in the HPI    Physical Examination:  Musculoskeletal:   Examination of the right upper extremity demonstrates normal sensation in median, ulnar and radial distribution, cap refill in all fingers < 5 seconds, negative carpal tunnel compression and Phalen test.  Mild prominence and instability of the base of first metacarpal. CMC grind is positive for pain and crepitus. Thumb MCP joint hyperextends 15 degrees. No pain at the radial styloid.    Examination of the left upper extremity demonstrates normal sensation in median, ulnar and radial distribution, cap refill in all fingers < 5 seconds, negative carpal tunnel compression and Phalen test.  Mild prominence and instability of the base of first metacarpal. CMC grind is positive for pain and crepitus. Thumb MCP joint hyperextends 5 degrees. No pain at the radial styloid.    Imaging / Electrodiagnostic Tests:     Left Hand XR: AP, Lateral, Oblique and Thumb CMC joint     Clinical Indication:  1. Pain in both hands    2. Arthritis of carpometacarpal (CMC) joint of left thumb    3. Dupuytren's contracture of right hand    4. Dupuytren's contracture of left hand    5. Primary osteoarthritis of first

## 2024-04-08 NOTE — PATIENT INSTRUCTIONS
Patient Education        Learning About Arthritis at the Base of the Thumb  What is it?     Arthritis at the base of the thumb joint is wear and tear on the cartilage. Cartilage is a firm, thick, slippery tissue. It covers and protects the ends of bones where they meet to form a joint. When you have arthritis, there are changes in the cartilage that cause it to break down. The bones rub together and cause joint damage and pain.  What causes it?  Experts don't know what causes arthritis at the base of the thumb. But aging, a lot of use, an injury, or family history may play a part.  What are the symptoms?  Symptoms of arthritis at the base of the thumb include aching in your joint. Or the pain may feel burning or sharp. You may feel clicking, creaking, or catching in the joint. It may get stiff. You may have more pain and less strength when you pinch or  things.  Symptoms may come and go, stay the same, or get worse over time.  How is it diagnosed?  Your doctor can often diagnose arthritis by asking you questions about your joint pain and other symptoms and examining you. You may also have X-rays and blood tests. Blood tests can help make sure another disease isn't causing your symptoms.  How is it treated?  Arthritis at the base of your thumb may be treated with rest, pain relievers, steroid medicines, using a brace or splint, and--in some cases--surgery.  To help relieve pain in the joint, rest your sore hand. Switch hands for some activities. You can try heat and cold therapy, such as hot compresses, paraffin wax, cold packs, or ice massage.  Your doctor may give you a splint to wear during some activities or when pain flares up.  You can often manage mild or moderate arthritis pain with over-the-counter pain relievers. These include medicines that reduce swelling, such as ibuprofen or naproxen. You can also use acetaminophen. Sometimes these medicines are in creams that you can rub on your thumb and hand.

## 2024-04-09 NOTE — PROGRESS NOTES
Patient was fit for a CMC splint for patients left hand/joint. I demonstrated that the thumb slides into the opening and Velcro on the dorsal side of the hand. The strap continues around the thumb and Velcro's again on the ventral side of hand or palm, and then continues through the thumb and first finger to Velcro again on the dorsal side of hand.     Patient read and signed documenting they understand and agree to [facility]'s current DME return policy.

## 2024-06-03 ENCOUNTER — APPOINTMENT (RX ONLY)
Dept: URBAN - METROPOLITAN AREA CLINIC 23 | Facility: CLINIC | Age: 79
Setting detail: DERMATOLOGY
End: 2024-06-03

## 2024-06-03 DIAGNOSIS — Z71.89 OTHER SPECIFIED COUNSELING: ICD-10-CM

## 2024-06-03 DIAGNOSIS — L82.1 OTHER SEBORRHEIC KERATOSIS: ICD-10-CM

## 2024-06-03 DIAGNOSIS — L57.8 OTHER SKIN CHANGES DUE TO CHRONIC EXPOSURE TO NONIONIZING RADIATION: ICD-10-CM | Status: INADEQUATELY CONTROLLED

## 2024-06-03 DIAGNOSIS — L81.4 OTHER MELANIN HYPERPIGMENTATION: ICD-10-CM

## 2024-06-03 DIAGNOSIS — L57.0 ACTINIC KERATOSIS: ICD-10-CM

## 2024-06-03 DIAGNOSIS — D485 NEOPLASM OF UNCERTAIN BEHAVIOR OF SKIN: ICD-10-CM

## 2024-06-03 PROBLEM — D48.5 NEOPLASM OF UNCERTAIN BEHAVIOR OF SKIN: Status: ACTIVE | Noted: 2024-06-03

## 2024-06-03 PROCEDURE — ? BIOPSY BY SHAVE METHOD

## 2024-06-03 PROCEDURE — ? PRESCRIPTION

## 2024-06-03 PROCEDURE — 99204 OFFICE O/P NEW MOD 45 MIN: CPT | Mod: 25

## 2024-06-03 PROCEDURE — ? LIQUID NITROGEN

## 2024-06-03 PROCEDURE — 11102 TANGNTL BX SKIN SINGLE LES: CPT

## 2024-06-03 PROCEDURE — ? COUNSELING

## 2024-06-03 PROCEDURE — 17000 DESTRUCT PREMALG LESION: CPT | Mod: 59

## 2024-06-03 RX ORDER — PHARMACY COMPOUNDING ACCESSORY
EACH MISCELLANEOUS
Qty: 15 | Refills: 1 | Status: ERX | COMMUNITY
Start: 2024-06-03

## 2024-06-03 RX ORDER — TRIAMCINOLONE ACETONIDE 1 MG/G
OINTMENT TOPICAL
Qty: 15 | Refills: 0 | Status: ERX | COMMUNITY
Start: 2024-06-03

## 2024-06-03 RX ADMIN — TRIAMCINOLONE ACETONIDE: 1 OINTMENT TOPICAL at 00:00

## 2024-06-03 RX ADMIN — Medication: at 00:00

## 2024-06-03 ASSESSMENT — LOCATION SIMPLE DESCRIPTION DERM
LOCATION SIMPLE: LEFT LIP
LOCATION SIMPLE: RIGHT UPPER BACK
LOCATION SIMPLE: LEFT POSTERIOR UPPER ARM
LOCATION SIMPLE: LEFT SHOULDER
LOCATION SIMPLE: RIGHT POSTERIOR UPPER ARM
LOCATION SIMPLE: LEFT FOREHEAD
LOCATION SIMPLE: CHEST

## 2024-06-03 ASSESSMENT — LOCATION DETAILED DESCRIPTION DERM
LOCATION DETAILED: RIGHT SUPERIOR MEDIAL UPPER BACK
LOCATION DETAILED: LEFT DISTAL POSTERIOR UPPER ARM
LOCATION DETAILED: LEFT ANTERIOR SHOULDER
LOCATION DETAILED: RIGHT PROXIMAL POSTERIOR UPPER ARM
LOCATION DETAILED: LEFT UPPER CUTANEOUS LIP
LOCATION DETAILED: LEFT INFERIOR LATERAL FOREHEAD
LOCATION DETAILED: LEFT MEDIAL SUPERIOR CHEST

## 2024-06-03 ASSESSMENT — LOCATION ZONE DERM
LOCATION ZONE: FACE
LOCATION ZONE: LIP
LOCATION ZONE: ARM
LOCATION ZONE: TRUNK

## 2024-06-20 ENCOUNTER — APPOINTMENT (RX ONLY)
Dept: URBAN - METROPOLITAN AREA CLINIC 23 | Facility: CLINIC | Age: 79
Setting detail: DERMATOLOGY
End: 2024-06-20

## 2024-06-20 PROBLEM — C44.519 BASAL CELL CARCINOMA OF SKIN OF OTHER PART OF TRUNK: Status: ACTIVE | Noted: 2024-06-20

## 2024-06-20 PROCEDURE — 12032 INTMD RPR S/A/T/EXT 2.6-7.5: CPT

## 2024-06-20 PROCEDURE — ? EXCISION

## 2024-06-20 PROCEDURE — 11602 EXC TR-EXT MAL+MARG 1.1-2 CM: CPT

## 2024-06-20 NOTE — PROCEDURE: EXCISION

## 2024-07-03 ENCOUNTER — APPOINTMENT (RX ONLY)
Dept: URBAN - METROPOLITAN AREA CLINIC 23 | Facility: CLINIC | Age: 79
Setting detail: DERMATOLOGY
End: 2024-07-03

## 2024-07-03 DIAGNOSIS — Z48.01 ENCOUNTER FOR CHANGE OR REMOVAL OF SURGICAL WOUND DRESSING: ICD-10-CM

## 2024-07-03 PROCEDURE — 99024 POSTOP FOLLOW-UP VISIT: CPT

## 2024-07-03 PROCEDURE — ? SUTURE REMOVAL (GLOBAL PERIOD)

## 2024-07-03 PROCEDURE — ? PATHOLOGY DISCUSSION

## 2024-07-03 ASSESSMENT — LOCATION DETAILED DESCRIPTION DERM: LOCATION DETAILED: LEFT MEDIAL SUPERIOR CHEST

## 2024-07-03 ASSESSMENT — LOCATION SIMPLE DESCRIPTION DERM: LOCATION SIMPLE: CHEST

## 2024-07-03 ASSESSMENT — LOCATION ZONE DERM: LOCATION ZONE: TRUNK

## 2024-07-03 NOTE — PROCEDURE: PATHOLOGY DISCUSSION
Detail Level: Detailed
Add 86173 Cpt? (Important Note: In 2017 The Use Of 59556 Is Being Tracked By Cms To Determine Future Global Period Reimbursement For Global Periods): yes

## 2024-07-03 NOTE — PROCEDURE: SUTURE REMOVAL (GLOBAL PERIOD)
Detail Level: Detailed
Add 10258 Cpt? (Important Note: In 2017 The Use Of 70652 Is Being Tracked By Cms To Determine Future Global Period Reimbursement For Global Periods): yes

## 2024-07-30 ENCOUNTER — APPOINTMENT (RX ONLY)
Dept: URBAN - METROPOLITAN AREA CLINIC 24 | Facility: CLINIC | Age: 79
Setting detail: DERMATOLOGY
End: 2024-07-30

## 2024-07-30 DIAGNOSIS — L21.8 OTHER SEBORRHEIC DERMATITIS: ICD-10-CM | Status: INADEQUATELY CONTROLLED

## 2024-07-30 DIAGNOSIS — L259 CONTACT DERMATITIS AND OTHER ECZEMA, UNSPECIFIED CAUSE: ICD-10-CM

## 2024-07-30 PROBLEM — L23.9 ALLERGIC CONTACT DERMATITIS, UNSPECIFIED CAUSE: Status: ACTIVE | Noted: 2024-07-30

## 2024-07-30 PROCEDURE — 99214 OFFICE O/P EST MOD 30 MIN: CPT

## 2024-07-30 PROCEDURE — ? PRESCRIPTION

## 2024-07-30 PROCEDURE — ? COUNSELING

## 2024-07-30 PROCEDURE — ? REFERRAL

## 2024-07-30 PROCEDURE — ? PRESCRIPTION MEDICATION MANAGEMENT

## 2024-07-30 RX ORDER — HYDROCORTISONE 25 MG/G
OINTMENT TOPICAL
Qty: 28.35 | Refills: 1 | Status: ERX | COMMUNITY
Start: 2024-07-30

## 2024-07-30 RX ORDER — NYSTATIN 100000 [USP'U]/ML
SUSPENSION ORAL
Qty: 100 | Refills: 0 | Status: ERX | COMMUNITY
Start: 2024-07-30

## 2024-07-30 RX ADMIN — NYSTATIN: 100000 SUSPENSION ORAL at 00:00

## 2024-07-30 RX ADMIN — HYDROCORTISONE: 25 OINTMENT TOPICAL at 00:00

## 2024-07-30 ASSESSMENT — LOCATION ZONE DERM
LOCATION ZONE: EAR
LOCATION ZONE: LIP

## 2024-07-30 ASSESSMENT — LOCATION DETAILED DESCRIPTION DERM
LOCATION DETAILED: LEFT INFERIOR VERMILION LIP
LOCATION DETAILED: RIGHT INFERIOR VERMILION LIP
LOCATION DETAILED: RIGHT CAVUM CONCHA
LOCATION DETAILED: LEFT SUPERIOR VERMILION LIP

## 2024-07-30 ASSESSMENT — LOCATION SIMPLE DESCRIPTION DERM
LOCATION SIMPLE: RIGHT EAR
LOCATION SIMPLE: RIGHT LIP
LOCATION SIMPLE: LEFT LIP

## 2024-07-30 NOTE — PROCEDURE: PRESCRIPTION MEDICATION MANAGEMENT
Continue Regimen: Vaseline QD
Detail Level: Zone
Plan: We will also place a referral for patch testing to rule out allergens. She is also scheduled to to see her dentist 9/2024
Render In Strict Bullet Format?: No
Initiate Treatment: Hydrocortisone 2.5% ointment BID x 2 weeks \\nNystatin oral wash QD
Initiate Treatment: Hydrocortisone 2.5% ointment 1-2x daily x 2 weeks

## 2024-08-27 ENCOUNTER — OFFICE VISIT (OUTPATIENT)
Age: 79
End: 2024-08-27
Payer: MEDICARE

## 2024-08-27 DIAGNOSIS — M18.12 ARTHRITIS OF CARPOMETACARPAL (CMC) JOINT OF LEFT THUMB: Primary | ICD-10-CM

## 2024-08-27 DIAGNOSIS — M18.11 PRIMARY OSTEOARTHRITIS OF FIRST CARPOMETACARPAL JOINT OF RIGHT HAND: ICD-10-CM

## 2024-08-27 PROCEDURE — 20600 DRAIN/INJ JOINT/BURSA W/O US: CPT | Performed by: NURSE PRACTITIONER

## 2024-08-27 PROCEDURE — 1123F ACP DISCUSS/DSCN MKR DOCD: CPT | Performed by: NURSE PRACTITIONER

## 2024-08-27 PROCEDURE — 99214 OFFICE O/P EST MOD 30 MIN: CPT | Performed by: NURSE PRACTITIONER

## 2024-08-27 RX ORDER — BETAMETHASONE SODIUM PHOSPHATE AND BETAMETHASONE ACETATE 3; 3 MG/ML; MG/ML
6 INJECTION, SUSPENSION INTRA-ARTICULAR; INTRALESIONAL; INTRAMUSCULAR; SOFT TISSUE ONCE
Status: COMPLETED | OUTPATIENT
Start: 2024-08-27 | End: 2024-08-27

## 2024-08-27 RX ADMIN — BETAMETHASONE SODIUM PHOSPHATE AND BETAMETHASONE ACETATE 6 MG: 3; 3 INJECTION, SUSPENSION INTRA-ARTICULAR; INTRALESIONAL; INTRAMUSCULAR; SOFT TISSUE at 13:41

## 2024-08-27 NOTE — PROGRESS NOTES
Orthopaedic Hand Surgery Note    Name: Angle Morgan  YOB: 1945  Gender: female  MRN: [id number]    CC: Follow up for bilateral thumb Carpometacarpal Joint osteoarthritis    HPI: Patient is a 79 y.o. female who is here regarding follow up for bilateral thumb CMC osteoarthritis.  She was last seen April 8, 2024.  She received a left thumb CMC injection.  She reports good relief for approximately 3-1/2 months.  She says her right one was just as painful now.    4/8/24 Patient is a 79 y.o. female with a chief complaint of right thumb pain and weakness, left worse than right. The patient complains of increased pain with activities involving pinch and  (ex/ opening jars, turning car key, buttons). The symptoms have been present for over 1 year. Evaluation to date has included PCP. Treatment to date has included none.  She also notes some lumps on the palmar aspect of her right hand.  She says they are not painful.     ROS/Meds/PSH/PMH/FH/SH: I personally reviewed the patients standard intake form.  Pertinents are discussed in the HPI    Physical Examination:  Musculoskeletal:   Examination of the bilateral upper extremity demonstrates normal sensation in median, ulnar and radial distribution, negative carpal tunnel compression and Phalen test, cap refill < 5 seconds in all fingers.  Mild prominence and instability of the base of the first metacarpal. CMC grind is positive for pain and crepitus. Thumb MCP joint hyperextends 0 degrees. No pain at the radial styloid.    Imaging / Electrodiagnostic Tests:     None    Assessment:     ICD-10-CM    1. Arthritis of carpometacarpal (CMC) joint of left thumb  M18.12 Ambulatory Referral to [facility]     betamethasone acetate-betamethasone sodium phosphate (CELESTONE) injection 6 mg     betamethasone acetate-betamethasone sodium phosphate (CELESTONE) injection 6 mg      2. Primary osteoarthritis of first carpometacarpal joint of right hand  M18.11 Ambulatory

## 2024-09-06 ENCOUNTER — OFFICE VISIT (OUTPATIENT)
Dept: ORTHOPEDIC SURGERY | Age: 79
End: 2024-09-06

## 2024-09-06 DIAGNOSIS — M25.551 RIGHT HIP PAIN: Primary | ICD-10-CM

## 2024-09-06 DIAGNOSIS — M47.816 LUMBAR SPONDYLOSIS: ICD-10-CM

## 2024-09-06 NOTE — PROGRESS NOTES
Name: Angle Morgan  YOB: 1945  Gender: female  MRN: [id number]    CC:   Chief Complaint   Patient presents with    Joint Pain     Right hip and low back pain will xray today          HPI:   The pain has been present for about 1 year and is becoming worse.  It does not hurt at night when sleeping.  There was not an acute injury to the hip.  The pain is located over the right side low back/ buttock region and occasionally lateral aspect of right hip.  It hurts to walk and pain worsens with increased distance.  Sitting or leaning forward helps alleviate symptoms.  The pain does radiate down the right leg below the knee at times.  Numbness and tingling are noted in the RLE at times.  The patient is not having difficulty putting socks and shoes on.        Treatment so far has been Mobic.  H/o L2-S1 fusion by Dr. Darnell.    Review of Systems  As per HPI.  Pertinent positives and negatives are addressed with the patient, particularly those related to musculoskeletal concerns.  Non-orthopaedic concerns were referred back to the primary care physician.      PMFSH:    Current Outpatient Medications:     meloxicam (MOBIC) 15 MG tablet, Take 1 tablet by mouth daily for 28 days, Disp: 28 tablet, Rfl: 0    diclofenac sodium (VOLTAREN) 1 % GEL, Apply 2 g topically 3 times daily for 28 days, Disp: 168 g, Rfl: 0    alendronate (FOSAMAX) 70 MG tablet, Take 70 mg by mouth every 7 days, Disp: , Rfl:     aspirin 81 MG EC tablet, Take 81 mg by mouth 2 times daily, Disp: , Rfl:     meloxicam (MOBIC) 7.5 MG tablet, Take 7.5 mg by mouth daily as needed, Disp: , Rfl:     oxybutynin (DITROPAN-XL) 5 MG extended release tablet, Take 5 mg by mouth daily, Disp: , Rfl:     pantoprazole (PROTONIX) 40 MG tablet, Take 40 mg by mouth daily, Disp: , Rfl:     PARoxetine (PAXIL) 10 MG tablet, Take 10 mg by mouth, Disp: , Rfl:   No Known Allergies  Past Medical History:   Diagnosis Date    Anxiety     managed with med    Arthritis

## 2024-09-17 ENCOUNTER — OFFICE VISIT (OUTPATIENT)
Age: 79
End: 2024-09-17
Payer: MEDICARE

## 2024-09-17 DIAGNOSIS — R29.898 WEAKNESS OF RIGHT HIP: Primary | ICD-10-CM

## 2024-09-17 DIAGNOSIS — Z98.1 S/P LUMBAR FUSION: ICD-10-CM

## 2024-09-17 DIAGNOSIS — M48.062 SPINAL STENOSIS, LUMBAR REGION WITH NEUROGENIC CLAUDICATION: ICD-10-CM

## 2024-09-17 PROCEDURE — 1123F ACP DISCUSS/DSCN MKR DOCD: CPT | Performed by: NURSE PRACTITIONER

## 2024-09-17 PROCEDURE — 99204 OFFICE O/P NEW MOD 45 MIN: CPT | Performed by: NURSE PRACTITIONER

## 2024-09-17 PROCEDURE — G2211 COMPLEX E/M VISIT ADD ON: HCPCS | Performed by: NURSE PRACTITIONER

## 2024-09-17 RX ORDER — CLOBETASOL PROPIONATE 0.5 MG/G
OINTMENT TOPICAL
COMMUNITY

## 2024-09-17 RX ORDER — FLUCONAZOLE 150 MG/1
150 TABLET ORAL DAILY
COMMUNITY
Start: 2024-07-23

## 2024-09-17 RX ORDER — ROSUVASTATIN CALCIUM 5 MG/1
TABLET, COATED ORAL
COMMUNITY
Start: 2024-09-10

## 2024-10-21 ENCOUNTER — OFFICE VISIT (OUTPATIENT)
Age: 79
End: 2024-10-21
Payer: MEDICARE

## 2024-10-21 DIAGNOSIS — M48.062 SPINAL STENOSIS, LUMBAR REGION WITH NEUROGENIC CLAUDICATION: ICD-10-CM

## 2024-10-21 DIAGNOSIS — R29.898 WEAKNESS OF RIGHT HIP: Primary | ICD-10-CM

## 2024-10-21 DIAGNOSIS — Z98.1 S/P LUMBAR FUSION: ICD-10-CM

## 2024-10-21 DIAGNOSIS — N28.1 RENAL CYST, RIGHT: ICD-10-CM

## 2024-10-21 PROCEDURE — 99214 OFFICE O/P EST MOD 30 MIN: CPT | Performed by: NURSE PRACTITIONER

## 2024-10-21 PROCEDURE — 1123F ACP DISCUSS/DSCN MKR DOCD: CPT | Performed by: NURSE PRACTITIONER

## 2024-10-21 NOTE — PROGRESS NOTES
Name: Angle Morgan  YOB: 1945  Gender: female  MRN: [id number]    CC: Follow-up right hip pain, right hip weakness    HPI: This is a 79 y.o. year old female who has a history of L2-S1 fusion by Dr. Darnell in 2013.  She did well.  She is also had neck surgery by Dr. Terry.  She has had physical therapy and chiropractic care.  She was treated over a year ago for greater trochanteric bursitis.  She has started to walk with a very altered gait.  There is weakness in the right hip causing her to walk with a significant limp.  She last had an MRI in 2017 that revealed advanced foraminal stenosis at the L1-2 level.  She is extremely active.  She was referred to me by the hip providers.  She denies any upper extremity weakness.  Therefore at last visit was referred for an MRI of the lumbar spine.    Thus far, the patient has tried NSAIDS, physical therapy, and surgery.    Current pain level: Pain is not significant her gait change is the most altering  Activities limited by pain: Walking.             9/17/2024     2:39 PM   AMB PAIN ASSESSMENT   Severity of Pain 3   Frequency of Pain Intermittent   Limiting Behavior Yes   Result of Injury No   Work-Related Injury No   Are there other pain locations you wish to document? No            No Known Allergies    Current Outpatient Medications:     clobetasol (TEMOVATE) 0.05 % ointment, APPLY A PEASIZE AMOUNT TOPICALLY TO THE OUTSIDE OF YOUR PERINEUM TWO TIMES A DAY AS NEEDED FOR ITCHING, Disp: , Rfl:     fluconazole (DIFLUCAN) 150 MG tablet, Take 1 tablet by mouth daily, Disp: , Rfl:     rosuvastatin (CRESTOR) 5 MG tablet, , Disp: , Rfl:     meloxicam (MOBIC) 15 MG tablet, Take 1 tablet by mouth daily for 28 days, Disp: 28 tablet, Rfl: 0    diclofenac sodium (VOLTAREN) 1 % GEL, Apply 2 g topically 3 times daily for 28 days, Disp: 168 g, Rfl: 0    alendronate (FOSAMAX) 70 MG tablet, Take 1 tablet by mouth every 7 days, Disp: , Rfl:     meloxicam

## 2024-11-04 ENCOUNTER — HOSPITAL ENCOUNTER (OUTPATIENT)
Dept: ULTRASOUND IMAGING | Age: 79
Discharge: HOME | End: 2024-11-07
Payer: MEDICARE

## 2024-11-04 DIAGNOSIS — M48.062 SPINAL STENOSIS, LUMBAR REGION WITH NEUROGENIC CLAUDICATION: ICD-10-CM

## 2024-11-04 DIAGNOSIS — R29.898 WEAKNESS OF RIGHT HIP: ICD-10-CM

## 2024-11-04 DIAGNOSIS — Z98.1 S/P LUMBAR FUSION: ICD-10-CM

## 2024-11-04 DIAGNOSIS — N28.1 RENAL CYST, RIGHT: ICD-10-CM

## 2024-11-04 PROCEDURE — 76770 US EXAM ABDO BACK WALL COMP: CPT

## 2024-11-18 ENCOUNTER — OFFICE VISIT (OUTPATIENT)
Age: 79
End: 2024-11-18
Payer: MEDICARE

## 2024-11-18 DIAGNOSIS — M48.062 SPINAL STENOSIS, LUMBAR REGION WITH NEUROGENIC CLAUDICATION: Primary | ICD-10-CM

## 2024-11-18 DIAGNOSIS — Z98.1 HISTORY OF LUMBAR FUSION: ICD-10-CM

## 2024-11-18 DIAGNOSIS — M71.38 SYNOVIAL CYST OF LUMBAR SPINE: ICD-10-CM

## 2024-11-18 PROCEDURE — 1159F MED LIST DOCD IN RCRD: CPT | Performed by: ORTHOPAEDIC SURGERY

## 2024-11-18 PROCEDURE — 99214 OFFICE O/P EST MOD 30 MIN: CPT | Performed by: ORTHOPAEDIC SURGERY

## 2024-11-18 PROCEDURE — 1123F ACP DISCUSS/DSCN MKR DOCD: CPT | Performed by: ORTHOPAEDIC SURGERY

## 2024-11-18 NOTE — PROGRESS NOTES
Name: Angle Morgan  YOB: 1945  Gender: female  MRN: [id number]  Age: 79 y.o.      Chief Complaint:  Radiating back and leg pain    History of Present Illness:      This patient is known to me from about 10 years ago when I had performed an L2-S1 laminectomy and fusion with Dr. Darnell and also did a cervical spine surgery on her.  Now, she presents with recurrent symptoms of neurogenic claudication with limited tolerance for walking and standing and inability to stand up straight without significant levels of pain.  She has tried physical therapy and chiropractic care.  She has now developed a significant limp with her gait.  She has been worked up by the hip providers at [facility] after failing to diagnose anything intra-articular.  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        Medications:       Current Outpatient Medications:     clobetasol (TEMOVATE) 0.05 % ointment, APPLY A PEASIZE AMOUNT TOPICALLY TO THE OUTSIDE OF YOUR PERINEUM TWO TIMES A DAY AS NEEDED FOR ITCHING, Disp: , Rfl:     fluconazole (DIFLUCAN) 150 MG tablet, Take 1 tablet by mouth daily, Disp: , Rfl:     rosuvastatin (CRESTOR) 5 MG tablet, , Disp: , Rfl:     meloxicam (MOBIC) 15 MG tablet, Take 1 tablet by mouth daily for 28 days, Disp: 28 tablet, Rfl: 0    diclofenac sodium (VOLTAREN) 1 % GEL, Apply 2 g topically 3 times daily for 28 days, Disp: 168 g, Rfl: 0    alendronate (FOSAMAX) 70 MG tablet, Take 1 tablet by mouth every 7 days, Disp: , Rfl:     meloxicam (MOBIC) 7.5 MG tablet, Take 1 tablet by mouth daily as needed, Disp: , Rfl:     oxybutynin (DITROPAN-XL) 5 MG extended release tablet, Take 1 tablet by mouth daily, Disp: , Rfl:     pantoprazole (PROTONIX) 40 MG tablet, Take 1 tablet by mouth daily, Disp: , Rfl:     PARoxetine (PAXIL) 10 MG tablet, Take 1 tablet by mouth, Disp: , Rfl:     Allergies:  No Known Allergies      Physical Exam:     This is a

## 2024-11-18 NOTE — PROGRESS NOTES
Name: Angle Morgan  YOB: 1945  Gender: female  MRN: [id number]  Age: 79 y.o.      Chief Complaint:  Radiating back and leg pain    History of Present Illness:      The patient returns today with persistent symptoms of lumbar *** resulting in a significant functional decline as previously described. The symptoms are worse with simple activities of daily living including walking and standing and there has been no lasting benefit from conservative efforts including {spinetreatlist:24331}.   At this point  she is ready to proceed with surgical intervention.          Medications:       Current Outpatient Medications:     clobetasol (TEMOVATE) 0.05 % ointment, APPLY A PEASIZE AMOUNT TOPICALLY TO THE OUTSIDE OF YOUR PERINEUM TWO TIMES A DAY AS NEEDED FOR ITCHING, Disp: , Rfl:     fluconazole (DIFLUCAN) 150 MG tablet, Take 1 tablet by mouth daily, Disp: , Rfl:     rosuvastatin (CRESTOR) 5 MG tablet, , Disp: , Rfl:     meloxicam (MOBIC) 15 MG tablet, Take 1 tablet by mouth daily for 28 days, Disp: 28 tablet, Rfl: 0    diclofenac sodium (VOLTAREN) 1 % GEL, Apply 2 g topically 3 times daily for 28 days, Disp: 168 g, Rfl: 0    alendronate (FOSAMAX) 70 MG tablet, Take 1 tablet by mouth every 7 days, Disp: , Rfl:     meloxicam (MOBIC) 7.5 MG tablet, Take 1 tablet by mouth daily as needed, Disp: , Rfl:     oxybutynin (DITROPAN-XL) 5 MG extended release tablet, Take 1 tablet by mouth daily, Disp: , Rfl:     pantoprazole (PROTONIX) 40 MG tablet, Take 1 tablet by mouth daily, Disp: , Rfl:     PARoxetine (PAXIL) 10 MG tablet, Take 1 tablet by mouth, Disp: , Rfl:     Allergies:  No Known Allergies      Physical Exam:     This is a well developed well nourished female adult.     Mood and affect are appropriate.    Oriented to person, place, and time.    Chest is clear to auscultation.    Heart is regular rate and rhythm.     The patient ambulates with an antalgic and crouched gait.     There is diminished

## 2024-11-20 ENCOUNTER — HOSPITAL ENCOUNTER (OUTPATIENT)
Dept: PHYSICAL THERAPY | Age: 79
Setting detail: RECURRING SERIES
Discharge: HOME | End: 2024-11-23
Payer: MEDICARE

## 2024-11-20 DIAGNOSIS — R26.2 DIFFICULTY IN WALKING, NOT ELSEWHERE CLASSIFIED: ICD-10-CM

## 2024-11-20 DIAGNOSIS — M53.3 SI (SACROILIAC) JOINT DYSFUNCTION: ICD-10-CM

## 2024-11-20 DIAGNOSIS — M54.16 RADICULOPATHY, LUMBAR REGION: Primary | ICD-10-CM

## 2024-11-20 DIAGNOSIS — M25.551 PAIN IN RIGHT HIP: ICD-10-CM

## 2024-11-20 DIAGNOSIS — M62.81 MUSCLE WEAKNESS (GENERALIZED): ICD-10-CM

## 2024-11-20 PROCEDURE — 97161 PT EVAL LOW COMPLEX 20 MIN: CPT

## 2024-11-20 PROCEDURE — 97110 THERAPEUTIC EXERCISES: CPT

## 2024-11-20 ASSESSMENT — PAIN SCALES - GENERAL: PAINLEVEL_OUTOF10: 5

## 2024-11-20 NOTE — PROGRESS NOTES
3/10  Medications Last Reviewed:  11/20/2024  Updated Objective Findings:  See Evaluation Note from today  Treatment   THERAPEUTIC EXERCISE: (23 minutes):    Exercises per grid below to improve mobility, strength, balance, and coordination.  Required moderate visual, verbal, manual, and tactile cues to promote proper body alignment, promote proper body posture, promote proper body mechanics, and promote proper body breathing techniques.  Progressed resistance, range, repetitions, and complexity of movement as indicated.   Date:  11/20/2024 Date:   Date:     Activity/Exercise Parameters Parameters Parameters   Clams 3 x 20 Hooklying Red Band     Knee Fall Outs 3 x 10 Red Band Hooklying Each Leg     March Hooklying 3 x 10 Red Band     Sit to Stand 3 x 10 Red Band Gluteal Activation                         Time spent with patient reviewing proper muscle recruitment and technique with exercises.     MANUAL THERAPY: (0 minutes):   Joint mobilization and Soft tissue mobilization was utilized and necessary because of the patient's restricted joint motion, painful spasm, loss of articular motion, and restricted motion of soft tissue.   None Today    MODALITIES: (0 minutes):        None Today      HEP: As above; handouts given to patient for all exercises.     Treatment/Session Summary:    Treatment Assessment:   Patient reported decreased pain with treatment. She was instructed regarding an initial HEP. She would benefit from continued graded progression.  Communication/Consultation:  Therapy Evaluation sent to referring provider  Equipment provided today:  HEP and Theraband  Recommendations/Intent for next treatment session: Next visit will focus on decreasing pain, improving mobility, flexibility, ROM, muscle activation, strength, balance, gait, and function.    >Total Treatment Billable Duration:  55 minutes (32 minutes evaluation, 23 minutes therapeutic exercise)  Time In: 1100  Time Out: 1200    Amado Gonzalez PT

## 2024-11-20 NOTE — THERAPY EVALUATION
into her right hip and leg. She reports also having weakness and difficulty standing and walking. She reports she had a lumbar fusion back in 2013, L2 - S1. She reports that she has trouble walking 1/2 a block, tries to push through the pain. She reports significant difficulty with stairs, steps, and housework. She reports having X-Ray, MRI, Ultrasound and per patient showed a cyst that was causing spinal stenosis. She reports her hips, specifically the left hip \"clicks and clunks.\" She reports also recently getting diagnosis of right great toe arthritis. She reports her right knee has started to hurt and give out intermittently. She reports that wants to get rid of the pain. She reports that she is scheduled for surgery on 1/22/2025 to have the cyst removed.     Patient Stated Goal(s):  \"get rid of pain\"  Initial Pain Level:      5/10   Post Session Pain Level:     3/10  Past Medical History/Comorbidities:   Ms. Morgan  has a past medical history of Anxiety, Arthritis, Chronic pain, COVID-19 vaccine series completed, Former smoker, GERD (gastroesophageal reflux disease), Hormone replacement therapy (postmenopausal), and Nausea & vomiting.  Ms. Morgan  has a past surgical history that includes lumbar laminectomy (1998); Hammer toe surgery (Right); bladder repair (2006); Hysterectomy (1992); cervical fusion (08/25/2021); and back surgery (2013).  Social History/Living Environment:   Patient lives with their spouse  Type of Home: House: Two Story and Number of stairs to enter/exit: 2 no rail, 12 steps in home with rail     Prior Level of Function/Work/Activity:   Prior Level of Function: Independent   Current Level of Function: Mod Independent      Learning:   Does the patient/guardian have any barriers to learning?: No barriers  Will there be a co-learner?: No  What is the preferred language of the patient/guardian?: English  Is an  required?: No  How does the patient/guardian prefer to learn new concepts?:

## 2024-11-26 ENCOUNTER — PREP FOR PROCEDURE (OUTPATIENT)
Age: 79
End: 2024-11-26

## 2024-11-26 DIAGNOSIS — Z98.1 HISTORY OF LUMBAR SPINAL FUSION: ICD-10-CM

## 2024-11-26 DIAGNOSIS — M48.062 SPINAL STENOSIS, LUMBAR REGION WITH NEUROGENIC CLAUDICATION: Primary | ICD-10-CM

## 2024-11-26 DIAGNOSIS — M71.38 SYNOVIAL CYST OF LUMBAR SPINE: ICD-10-CM

## 2024-11-26 DIAGNOSIS — Z98.1 HISTORY OF LUMBAR FUSION: ICD-10-CM

## 2024-11-26 RX ORDER — ACETAMINOPHEN 325 MG/1
1000 TABLET ORAL ONCE
Status: CANCELLED | OUTPATIENT
Start: 2024-11-26 | End: 2024-11-26

## 2024-12-04 ENCOUNTER — HOSPITAL ENCOUNTER (OUTPATIENT)
Dept: PHYSICAL THERAPY | Age: 79
Setting detail: RECURRING SERIES
Discharge: HOME | End: 2024-12-07
Payer: MEDICARE

## 2024-12-04 PROCEDURE — 97110 THERAPEUTIC EXERCISES: CPT

## 2024-12-04 PROCEDURE — 97140 MANUAL THERAPY 1/> REGIONS: CPT

## 2024-12-04 ASSESSMENT — PAIN SCALES - GENERAL: PAINLEVEL_OUTOF10: 5

## 2024-12-04 NOTE — PROGRESS NOTES
Anlge Morgan  : 1945  Primary: [employer] Medicare-advantage Ppo (Medicare Managed)  Secondary: DEBBY [facility] [place]  [address]  [place] [address]  Phone: [phone]  Fax: [phone] Plan Frequency: 2 times per week for 60 days (Decreasing frequency to 1 time per week as medically appropriate)  Plan of Care/Certification Expiration Date: 25 (Plan of Care Start Date 2024)        Plan of Care/Certification Expiration Date:  Plan of Care/Certification Expiration Date: 25 (Plan of Care Start Date 2024)    Frequency/Duration: Plan Frequency: 2 times per week for 60 days (Decreasing frequency to 1 time per week as medically appropriate)      Time In/Out:   Time In: 1428  Time Out: 1526      PT Visit Info:    Progress Note Counter: 2      Visit Count:  2    OUTPATIENT PHYSICAL THERAPY:   Treatment Note 2024       Episode  (Low Back, SI, Right LE Pain, Weakness, Difficulty in Walking)               Treatment Diagnosis:    Radiculopathy, lumbar region  SI (sacroiliac) joint dysfunction  Muscle weakness (generalized)  Pain in right hip  Difficulty in walking, not elsewhere classified  Medical/Referring Diagnosis:    Weakness of right hip  Spinal stenosis, lumbar region with neurogenic claudication  S/P lumbar fusion  Renal cyst, right  Referring Physician:  Ellis Tomlinson APRN - CNP  MD Orders:  PT Eval and Treat   Return MD Appt:  2025   Date of Onset:  Onset Date: 13     Allergies:   Patient has no known allergies.  Restrictions/Precautions:   History of L2 - S1 Lumbar Fusion , Arthritis, Incontinence, Osteoporosis, Left Reverse Shoulder       Interventions Planned (Treatment may consist of any combination of the following):     See Assessment Note    Subjective Comments:   Patient reports having continued right low back pain.  Initial Pain Level::     5/10  Post Session Pain Level:

## 2024-12-11 ENCOUNTER — HOSPITAL ENCOUNTER (OUTPATIENT)
Dept: PHYSICAL THERAPY | Age: 79
Setting detail: RECURRING SERIES
Discharge: HOME | End: 2024-12-14
Payer: MEDICARE

## 2024-12-11 PROCEDURE — 97140 MANUAL THERAPY 1/> REGIONS: CPT

## 2024-12-11 PROCEDURE — 97110 THERAPEUTIC EXERCISES: CPT

## 2024-12-11 ASSESSMENT — PAIN SCALES - GENERAL: PAINLEVEL_OUTOF10: 0

## 2024-12-11 NOTE — PROGRESS NOTES
Angle Morgan  : 1945  Primary: [employer] Medicare-advantage Ppo (Medicare Managed)  Secondary: DEBBY [facility] [place]  [address]  [address]  [place] [address]  Phone: [phone]  Fax: [phone] Plan Frequency: 2 times per week for 60 days (Decreasing frequency to 1 time per week as medically appropriate)  Plan of Care/Certification Expiration Date: 25 (Plan of Care Start Date 2024)        Plan of Care/Certification Expiration Date:  Plan of Care/Certification Expiration Date: 25 (Plan of Care Start Date 2024)    Frequency/Duration: Plan Frequency: 2 times per week for 60 days (Decreasing frequency to 1 time per week as medically appropriate)      Time In/Out:   Time In: 1100  Time Out: 1158      PT Visit Info:    Progress Note Counter: 3      Visit Count:  3    OUTPATIENT PHYSICAL THERAPY:   Treatment Note 2024       Episode  (Low Back, SI, Right LE Pain, Weakness, Difficulty in Walking)               Treatment Diagnosis:    Radiculopathy, lumbar region  SI (sacroiliac) joint dysfunction  Muscle weakness (generalized)  Pain in right hip  Difficulty in walking, not elsewhere classified  Medical/Referring Diagnosis:    Weakness of right hip  Spinal stenosis, lumbar region with neurogenic claudication  S/P lumbar fusion  Renal cyst, right  Referring Physician:  Ellis Tomlinson APRN - CNP  MD Orders:  PT Eval and Treat   Return MD Appt:  2025   Date of Onset:  Onset Date: 13     Allergies:   Patient has no known allergies.  Restrictions/Precautions:   History of L2 - S1 Lumbar Fusion , Arthritis, Incontinence, Osteoporosis, Left Reverse Shoulder       Interventions Planned (Treatment may consist of any combination of the following):     See Assessment Note    Subjective Comments:   Patient reports not having any pain.  Initial Pain Level::     0/10  Post Session Pain Level:       0/10  Medications

## 2024-12-18 ENCOUNTER — HOSPITAL ENCOUNTER (OUTPATIENT)
Dept: PHYSICAL THERAPY | Age: 79
Setting detail: RECURRING SERIES
Discharge: HOME | End: 2024-12-21
Payer: MEDICARE

## 2024-12-18 PROCEDURE — 97140 MANUAL THERAPY 1/> REGIONS: CPT

## 2024-12-18 PROCEDURE — 97110 THERAPEUTIC EXERCISES: CPT

## 2024-12-18 ASSESSMENT — PAIN SCALES - GENERAL: PAINLEVEL_OUTOF10: 0

## 2024-12-18 NOTE — PROGRESS NOTES
Angle Morgan  : 1945  Primary: [employer]-advantage Ppo (Medicare Managed)  Secondary:  [facility] [place]  [address] [place] [facility] [address]  Phone: [phone]  Fax: [phone] Plan Frequency: 2 times per week for 60 days (Decreasing frequency to 1 time per week as medically appropriate)  Plan of Care/Certification Expiration Date: 25 (Plan of Care Start Date 2024)        Plan of Care/Certification Expiration Date:  Plan of Care/Certification Expiration Date: 25 (Plan of Care Start Date 2024)    Frequency/Duration: Plan Frequency: 2 times per week for 60 days (Decreasing frequency to 1 time per week as medically appropriate)      Time In/Out:   Time In: 1328  Time Out: 1429      PT Visit Info:    Progress Note Counter: 4      Visit Count:  4    OUTPATIENT PHYSICAL THERAPY:   Treatment Note 2024       Episode  (Low Back, SI, Right LE Pain, Weakness, Difficulty in Walking)               Treatment Diagnosis:    Radiculopathy, lumbar region  SI (sacroiliac) joint dysfunction  Muscle weakness (generalized)  Pain in right hip  Difficulty in walking, not elsewhere classified  Medical/Referring Diagnosis:    Weakness of right hip  Spinal stenosis, lumbar region with neurogenic claudication  S/P lumbar fusion  Renal cyst, right  Referring Physician:  Ellis Tomlinson APRN - CNP  MD Orders:  PT Eval and Treat   Return MD Appt:  2025   Date of Onset:  Onset Date: 13     Allergies:   Patient has no known allergies.  Restrictions/Precautions:   History of L2 - S1 Lumbar Fusion , Arthritis, Incontinence, Osteoporosis, Left Reverse Shoulder       Interventions Planned (Treatment may consist of any combination of the following):     See Assessment Note    Subjective Comments:   Patient reports not having any pain in her hips, but fell . She reports that she was putting pasta from a pot into a Tupperware

## 2025-01-02 ENCOUNTER — HOSPITAL ENCOUNTER (OUTPATIENT)
Dept: SURGERY | Age: 80
Discharge: HOME | End: 2025-01-02
Payer: MEDICARE

## 2025-01-02 ENCOUNTER — TELEPHONE (OUTPATIENT)
Dept: SURGERY | Age: 80
End: 2025-01-02

## 2025-01-02 ENCOUNTER — APPOINTMENT (OUTPATIENT)
Dept: URBAN - METROPOLITAN AREA CLINIC 23 | Facility: CLINIC | Age: 80
Setting detail: DERMATOLOGY
End: 2025-01-02

## 2025-01-02 VITALS
SYSTOLIC BLOOD PRESSURE: 136 MMHG | HEART RATE: 98 BPM | HEIGHT: 63 IN | TEMPERATURE: 97.5 F | DIASTOLIC BLOOD PRESSURE: 65 MMHG | RESPIRATION RATE: 18 BRPM | BODY MASS INDEX: 22.93 KG/M2 | OXYGEN SATURATION: 98 % | WEIGHT: 129.4 LBS

## 2025-01-02 DIAGNOSIS — L57.0 ACTINIC KERATOSIS: ICD-10-CM | Status: INADEQUATELY CONTROLLED

## 2025-01-02 DIAGNOSIS — L21.8 OTHER SEBORRHEIC DERMATITIS: ICD-10-CM | Status: IMPROVED

## 2025-01-02 DIAGNOSIS — L259 CONTACT DERMATITIS AND OTHER ECZEMA, UNSPECIFIED CAUSE: ICD-10-CM | Status: IMPROVED

## 2025-01-02 DIAGNOSIS — D18.0 HEMANGIOMA: ICD-10-CM

## 2025-01-02 DIAGNOSIS — Z71.89 OTHER SPECIFIED COUNSELING: ICD-10-CM

## 2025-01-02 DIAGNOSIS — L82.1 OTHER SEBORRHEIC KERATOSIS: ICD-10-CM

## 2025-01-02 PROBLEM — D18.01 HEMANGIOMA OF SKIN AND SUBCUTANEOUS TISSUE: Status: ACTIVE | Noted: 2025-01-02

## 2025-01-02 PROBLEM — L23.9 ALLERGIC CONTACT DERMATITIS, UNSPECIFIED CAUSE: Status: ACTIVE | Noted: 2025-01-02

## 2025-01-02 LAB
ANION GAP SERPL CALC-SCNC: 11 MMOL/L (ref 7–16)
APPEARANCE UR: CLEAR
APTT PPP: 26.8 SEC (ref 23.3–37.4)
BACTERIA URNS QL MICRO: NEGATIVE /HPF
BILIRUB UR QL: NEGATIVE
BUN SERPL-MCNC: 37 MG/DL (ref 8–23)
CALCIUM SERPL-MCNC: 9.6 MG/DL (ref 8.8–10.2)
CHLORIDE SERPL-SCNC: 105 MMOL/L (ref 98–107)
CO2 SERPL-SCNC: 26 MMOL/L (ref 20–29)
COLOR UR: ABNORMAL
CREAT SERPL-MCNC: 0.82 MG/DL (ref 0.6–1.1)
EPI CELLS #/AREA URNS HPF: ABNORMAL /HPF
ERYTHROCYTE [DISTWIDTH] IN BLOOD BY AUTOMATED COUNT: 14 % (ref 11.9–14.6)
GLUCOSE SERPL-MCNC: 123 MG/DL (ref 70–99)
GLUCOSE UR STRIP.AUTO-MCNC: NEGATIVE MG/DL
HCT VFR BLD AUTO: 34.9 % (ref 35.8–46.3)
HGB BLD-MCNC: 11.2 G/DL (ref 11.7–15.4)
HGB UR QL STRIP: ABNORMAL
HYALINE CASTS URNS QL MICRO: ABNORMAL /LPF
KETONES UR QL STRIP.AUTO: NEGATIVE MG/DL
LEUKOCYTE ESTERASE UR QL STRIP.AUTO: NEGATIVE
MCH RBC QN AUTO: 29.4 PG (ref 26.1–32.9)
MCHC RBC AUTO-ENTMCNC: 32.1 G/DL (ref 31.4–35)
MCV RBC AUTO: 91.6 FL (ref 82–102)
MRSA DNA SPEC QL NAA+PROBE: NOT DETECTED
NITRITE UR QL STRIP.AUTO: NEGATIVE
NRBC # BLD: 0 K/UL (ref 0–0.2)
PH UR STRIP: 6 (ref 5–9)
PLATELET # BLD AUTO: 308 K/UL (ref 150–450)
PMV BLD AUTO: 9.1 FL (ref 9.4–12.3)
POTASSIUM SERPL-SCNC: 4.5 MMOL/L (ref 3.5–5.1)
PROT UR STRIP-MCNC: NEGATIVE MG/DL
RBC # BLD AUTO: 3.81 M/UL (ref 4.05–5.2)
RBC #/AREA URNS HPF: ABNORMAL /HPF
S AUREUS CPE NOSE QL NAA+PROBE: NOT DETECTED
SODIUM SERPL-SCNC: 142 MMOL/L (ref 136–145)
SP GR UR REFRACTOMETRY: 1.01 (ref 1–1.02)
UROBILINOGEN UR QL STRIP.AUTO: 0.2 EU/DL (ref 0.2–1)
WBC # BLD AUTO: 8.6 K/UL (ref 4.3–11.1)
WBC URNS QL MICRO: ABNORMAL /HPF

## 2025-01-02 PROCEDURE — 81001 URINALYSIS AUTO W/SCOPE: CPT

## 2025-01-02 PROCEDURE — 85027 COMPLETE CBC AUTOMATED: CPT

## 2025-01-02 PROCEDURE — 99214 OFFICE O/P EST MOD 30 MIN: CPT | Mod: 25

## 2025-01-02 PROCEDURE — ? PRESCRIPTION MEDICATION MANAGEMENT

## 2025-01-02 PROCEDURE — 80048 BASIC METABOLIC PNL TOTAL CA: CPT

## 2025-01-02 PROCEDURE — 85730 THROMBOPLASTIN TIME PARTIAL: CPT

## 2025-01-02 PROCEDURE — 17003 DESTRUCT PREMALG LES 2-14: CPT

## 2025-01-02 PROCEDURE — 17000 DESTRUCT PREMALG LESION: CPT

## 2025-01-02 PROCEDURE — ? COUNSELING

## 2025-01-02 PROCEDURE — ? LIQUID NITROGEN

## 2025-01-02 PROCEDURE — 87641 MR-STAPH DNA AMP PROBE: CPT

## 2025-01-02 RX ORDER — MIRABEGRON 25 MG/1
25 TABLET, FILM COATED, EXTENDED RELEASE ORAL DAILY
COMMUNITY

## 2025-01-02 ASSESSMENT — LOCATION DETAILED DESCRIPTION DERM
LOCATION DETAILED: LEFT SUPERIOR VERMILION LIP
LOCATION DETAILED: RIGHT LATERAL SUPERIOR CHEST
LOCATION DETAILED: RIGHT RIB CAGE
LOCATION DETAILED: RIGHT CAVUM CONCHA
LOCATION DETAILED: LEFT INFERIOR VERMILION LIP
LOCATION DETAILED: RIGHT INFERIOR MEDIAL UPPER BACK
LOCATION DETAILED: RIGHT INFERIOR VERMILION LIP
LOCATION DETAILED: RIGHT MEDIAL SUPERIOR CHEST

## 2025-01-02 ASSESSMENT — LOCATION SIMPLE DESCRIPTION DERM
LOCATION SIMPLE: RIGHT LIP
LOCATION SIMPLE: CHEST
LOCATION SIMPLE: LEFT LIP
LOCATION SIMPLE: ABDOMEN
LOCATION SIMPLE: RIGHT EAR
LOCATION SIMPLE: RIGHT UPPER BACK

## 2025-01-02 ASSESSMENT — LOCATION ZONE DERM
LOCATION ZONE: TRUNK
LOCATION ZONE: LIP
LOCATION ZONE: EAR

## 2025-01-02 NOTE — PROGRESS NOTES
Screening for urinary retention  For Male Spine Patients >50      1. Do you get up more than twice a night to urinate? no  2. Have you had to be catheterized for urinary retention after a previous surgery? no    Flomax indicated? No

## 2025-01-02 NOTE — PROGRESS NOTES
Enhanced Recovery After Surgery- Spine Surgery        Drink clear liquids up to 2 hours prior to your hospital arrival.     Bring your patient handbook with you to the hospital.         Drink clear liquids up to 2 hours prior to your hospital arrival. .    Bring your patient handbook with you to the hospital.    Things to remember:    You will be up on a chair the evening of surgery and drinking clear liquids. Your diet will be advanced by your surgeon as appropriate.    Drink Ensure Surgery after surgery. This will be provided to you in the hospital after your surgery. Drink one bottle twice daily for 5 days after surgery in the hospital. It is designed to support immune health and recovery from surgery.     Beginning the day after surgery, you will be up in a chair for all meals.    Beginning the day after surgery, you will be out of the bed for a minimum of 6 hours (not all at one time)    Beginning the day after surgery, you will walk in the manzo in the manzo at least 50' at least three times a day.    You will be given scheduled non-narcotic pain medication to help keep your pain under control.  You will have stronger pain medication ordered for break through pain.     All of these measures are geared toward returning your bowel to normal function as soon as possible and to prevent complications associated with bowel blockage, blood clots, and/or pneumonia           Learning About Enhanced Recovery After Surgery  What is enhanced recovery after surgery?     Enhanced recovery after surgery, or ERAS, is a way to help people prepare for surgery. The goal is to help you get ready for surgery and get better sooner. Before your surgery, you:  Eat well in the days leading up to the surgery.  Drink lots of clear fluids.  Learn some basic information about your surgery. Doing this can put your mind at ease.  Your doctor will explain any other tips to help you recover. You may have to avoid some foods.   During  surgery, you may get different kinds of medicine for pain. This can help you heal sooner and feel less pain later.  Soon after surgery, you will also drink fluids, eat as soon as you can, and try to walk a little. Different types of pain medicine may be used after surgery. This can also reduce the need for opioids and reduce side effects like constipation.  These recovery tips may be recommended for many types of surgeries, such as:  Colorectal surgery.  Surgery on the veins, arteries, heart, lungs, spine, or other organs.  Surgery to remove a growth (tumor).  Knowing what to expect before, during, and after surgery can help ease your fears. It can also help you take an active role in your recovery. Ask the doctor any questions you may have.  How do you prepare for surgery?  Eat healthy foods in the week leading up to your surgery. Your doctor will tell you how long before surgery to stop eating.  Try to get more exercise in the weeks leading up to your surgery. Even a little walking can help. The better shape you are in, the sooner you are likely to recover.  Drink plenty of liquids. Your doctor will tell you when you should stop drinking liquids.  Your doctor will also tell you if you should stop taking any medicines.  Do not smoke. Smoking can delay recovery. If you need help quitting, talk to your doctor about stop-smoking programs and medicines. These can increase your chances of quitting for good.  What can you expect during your surgery?  You will get medicine so that you relax and don't feel pain during your surgery. The surgical team will closely watch your fluid levels during the surgery to keep you well hydrated.  What can you expect after surgery?  You will drink water and eat healthy foods as soon as you can after surgery. Your care team will urge you to sit up in a chair while you eat.  The doctor or nurse will encourage you to get up and walk as soon as you can. The more you can move, or at least sit

## 2025-01-02 NOTE — PROCEDURE: PRESCRIPTION MEDICATION MANAGEMENT
Continue Regimen: Vaseline QD\\n\\nHydrocortisone 2.5% ointment BID x 2 weeks \\nNystatin oral wash QD
Detail Level: Zone
Render In Strict Bullet Format?: No
Continue Regimen: Hydrocortisone 2.5% ointment 1-2x daily x 2 weeks PRN

## 2025-01-02 NOTE — PROGRESS NOTES
Preoperative Nutrition Screen (CARLOS)   Patient's Age: 79 y.o.    Last Serum Albumin Level:  No results found for: \"ALBUMIN\"    Patient's BMI: Estimated body mass index is 22.99 kg/m² as calculated from the following:    Height as of 2/14/22: 1.6 m (5' 3\").    Weight as of 2/14/22: 58.9 kg (129 lb 12.8 oz).     If the answer is yes to any of the following, then recommend prescribed Oral Nutrition Supplements (ONS) for 5 days prior to surgery and in addition, if CARLOS > 3, order referral to dietitian for further assessment and nutrition therapy.     1. Does the patient have a documented serum albumin less than 3.0 within the last 90 days?    none   2. Is patient's BMI less than 18.5 (or less than 20 if age over 65)?   Unknown = 0      3. Has the patient had an unplanned weight loss of 10% of body weight or more in the last 6 months? No = 0   4. Has the patient been eating less than 50% of their normal diet in the preceding week? No = 0   CARLOS Score (number of Yes responses), 0-4 0     Plan:   No Nutrition Intervention indicated    Electronically signed by Dona Hernández RN on 1/2/25 at 2:32 PM EST

## 2025-01-02 NOTE — PROGRESS NOTES
Patient verified name and     Order for consent was not found in EHR  patient verified.     Type 2 surgery, walk in assessment complete.    Labs per surgeon: cbc,bmp,mrsa,ua,PT; results pending  Labs per anesthesia protocol: hgb; results pending  EKG: Not needed at time of PAT. No Hx CAD, DM, PVD or CVA         Patient provided with and instructed on educational handouts including Guide to Surgery, Preventing Surgical Site Infections, Pain Management, and [facility] Brochure.    Patient answered medical/surgical history questions at their best of ability. All prior to admission medications documented in EPIC. Original medication prescription bottle was not visualized during patient appointment.     Patient instructed to hold all vitamins 7 days prior to surgery and NSAIDS 5 days prior to surgery, patient verbalized understanding.     Patient teach back successful and patient demonstrates knowledge of instructions.

## 2025-01-02 NOTE — TELEPHONE ENCOUNTER
Review labs    Latest Reference Range & Units 01/02/25 14:57   BUN,BUNPL 8 - 23 MG/DL 37 (H)   (H): Data is abnormally high

## 2025-01-02 NOTE — PROGRESS NOTES
PLEASE CONTINUE TAKING ALL PRESCRIPTION MEDICATIONS UP TO THE DAY OF SURGERY UNLESS OTHERWISE DIRECTED BELOW. You may take Tylenol, allergy,  and/or indigestion medications.     TAKE ONLY THESE MEDICATIONS ON THE DAY OF SURGERY    Myrbetriq, Pantoprazole, Paroxetine, Rosuvastatin            DISCONTINUE all vitamins and supplements 7 days prior to surgery. DISCONTINUE Non-Steroidal Anti-Inflammatory (NSAIDS) such as Advil and Aleve 5 days prior to surgery.     Home Medications to Hold- please continue all other medications except these.    Meloxicam        Comments      On the day before surgery please take 2 Tylenol in the morning and then again before bed. You may use either regular or extra strength 1/20/25.           Please do not bring home medications with you on the day of surgery unless otherwise directed by your nurse.  If you are instructed to bring home medications, please give them to your nurse as they will be administered by the nursing staff.    If you have any questions, please call [facility] [phone].    A copy of this note was provided to the patient for reference.

## 2025-01-02 NOTE — PROGRESS NOTES
Latest Reference Range & Units 01/02/25 14:57   BUN,BUNPL 8 - 23 MG/DL 37 (H)   (H): Data is abnormally high  Telephone Message sent to Aishwarya Sánchez at Dr Bryant office and Penelope Kearns NP

## 2025-01-02 NOTE — PROCEDURE: LIQUID NITROGEN
Duration Of Freeze Thaw-Cycle (Seconds): 3
Post-Care Instructions: I reviewed with the patient in detail post-care instructions. Patient is to wear sunprotection, and avoid picking at any of the treated lesions. Pt may apply Vaseline to crusted or scabbing areas.
Number Of Freeze-Thaw Cycles: 1 freeze-thaw cycle
Render Note In Bullet Format When Appropriate: No
Show Applicator Variable?: Yes
Application Tool (Optional): Liquid Nitrogen Sprayer
Consent: The patient's consent was obtained including but not limited to risks of crusting, scabbing, blistering, scarring, darker or lighter pigmentary change, recurrence, incomplete removal and infection.
Aperture Size (Optional): C
Detail Level: Detailed

## 2025-01-03 ENCOUNTER — HOSPITAL ENCOUNTER (OUTPATIENT)
Dept: PHYSICAL THERAPY | Age: 80
Setting detail: RECURRING SERIES
Discharge: HOME | End: 2025-01-06
Payer: MEDICARE

## 2025-01-03 PROCEDURE — 97110 THERAPEUTIC EXERCISES: CPT

## 2025-01-03 ASSESSMENT — PAIN SCALES - GENERAL: PAINLEVEL_OUTOF10: 5

## 2025-01-03 NOTE — PROGRESS NOTES
Angle Morgan  : 1945  Primary: [place] (Medicare Managed)  Secondary:  [facility] [place]  [place] [place] [facility] [address]  Phone: [phone]  Fax: [phone] Plan Frequency: 2 times per week for 60 days (Decreasing frequency to 1 time per week as medically appropriate)  Plan of Care/Certification Expiration Date: 25 (Plan of Care Start Date 2024)        Plan of Care/Certification Expiration Date:  Plan of Care/Certification Expiration Date: 25 (Plan of Care Start Date 2024)    Frequency/Duration: Plan Frequency: 2 times per week for 60 days (Decreasing frequency to 1 time per week as medically appropriate)      Time In/Out:   Time In: 1330  Time Out: 1428      PT Visit Info:    Progress Note Counter: 5      Visit Count:  5    OUTPATIENT PHYSICAL THERAPY:   Treatment Note 1/3/2025       Episode  (Low Back, SI, Right LE Pain, Weakness, Difficulty in Walking)               Treatment Diagnosis:    Radiculopathy, lumbar region  SI (sacroiliac) joint dysfunction  Muscle weakness (generalized)  Pain in right hip  Difficulty in walking, not elsewhere classified  Medical/Referring Diagnosis:    Weakness of right hip  Spinal stenosis, lumbar region with neurogenic claudication  S/P lumbar fusion  Renal cyst, right  Referring Physician:  Ellis Tomlinson APRN - CNP  MD Orders:  PT Eval and Treat   Return MD Appt:  2025   Date of Onset:  Onset Date: 13     Allergies:   Patient has no known allergies.  Restrictions/Precautions:   History of L2 - S1 Lumbar Fusion , Arthritis, Incontinence, Osteoporosis, Left Reverse Shoulder       Interventions Planned (Treatment may consist of any combination of the following):     See Assessment Note    Subjective Comments:   Patient reports that she fell again. She reports that she was stepping down from a step stool, missed a step and fell and landed on her

## 2025-01-08 ENCOUNTER — HOSPITAL ENCOUNTER (OUTPATIENT)
Dept: PHYSICAL THERAPY | Age: 80
Setting detail: RECURRING SERIES
Discharge: HOME | End: 2025-01-11
Payer: MEDICARE

## 2025-01-08 PROCEDURE — 97110 THERAPEUTIC EXERCISES: CPT

## 2025-01-08 ASSESSMENT — PAIN SCALES - GENERAL: PAINLEVEL_OUTOF10: 4

## 2025-01-08 NOTE — PROGRESS NOTES
Angle Morgan  : 1945  [place] (Medicare Managed)  Secondary:  [facility] [place]  [place] [facility] [address]  Phone: [phone]  Fax: [phone] Plan Frequency: 2 times per week for 60 days (Decreasing frequency to 1 time per week as medically appropriate)  Plan of Care/Certification Expiration Date: 25 (Plan of Care Start Date 2024)        Plan of Care/Certification Expiration Date:  Plan of Care/Certification Expiration Date: 25 (Plan of Care Start Date 2024)    Frequency/Duration: Plan Frequency: 2 times per week for 60 days (Decreasing frequency to 1 time per week as medically appropriate)      Time In/Out:   Time In: 1328  Time Out: 1428      PT Visit Info:    Progress Note Counter: 6      Visit Count:  6    OUTPATIENT PHYSICAL THERAPY:   Treatment Note 2025       Episode  (Low Back, SI, Right LE Pain, Weakness, Difficulty in Walking)               Treatment Diagnosis:    Radiculopathy, lumbar region  SI (sacroiliac) joint dysfunction  Muscle weakness (generalized)  Pain in right hip  Difficulty in walking, not elsewhere classified  Medical/Referring Diagnosis:    Weakness of right hip  Spinal stenosis, lumbar region with neurogenic claudication  S/P lumbar fusion  Renal cyst, right  Referring Physician:  Ellis Tomlinson APRN - CNP  MD Orders:  PT Eval and Treat   Return MD Appt:  2025   Date of Onset:  Onset Date: 13     Allergies:   Patient has no known allergies.  Restrictions/Precautions:   History of L2 - S1 Lumbar Fusion , Arthritis, Incontinence, Osteoporosis, Left Reverse Shoulder       Interventions Planned (Treatment may consist of any combination of the following):     See Assessment Note    Subjective Comments:   Patient reports that she continues to have low back symptoms that come and go, but her hip and leg are feeling better since fall and

## 2025-01-13 ENCOUNTER — OFFICE VISIT (OUTPATIENT)
Dept: ORTHOPEDIC SURGERY | Age: 80
End: 2025-01-13

## 2025-01-13 DIAGNOSIS — M25.561 RIGHT KNEE PAIN, UNSPECIFIED CHRONICITY: Primary | ICD-10-CM

## 2025-01-13 DIAGNOSIS — S82.141A TIBIAL PLATEAU FRACTURE, RIGHT, CLOSED, INITIAL ENCOUNTER: ICD-10-CM

## 2025-01-13 NOTE — PROGRESS NOTES
Reddie Hinge brace for patients right knee. I explained how the hinge on each side of the brace should line up with the patella. The patient was also instructed to tighten the strap distal to the patella first to insure the brace is anchored and prevents slipping, , then the top strap should be tightened.Patient read and signed documenting they understand and agree to [facility]'s current DME return policy.   
Medical history:  Past Medical History:   Diagnosis Date    Anxiety     managed with med    Arthritis     osteo    Chronic pain     back and feet pain    COVID-19 vaccine series completed 02/2021    Ian    Former smoker quit 1985    15 yrs 1 1/2 pk per day    GERD (gastroesophageal reflux disease)     managed with med    Hormone replacement therapy (postmenopausal)     Nausea & vomiting 08/25/2021    with ACDF    PONV (postoperative nausea and vomiting)         has a past surgical history that includes lumbar laminectomy (1998); Hammer toe surgery (Right); bladder repair (2006); Hysterectomy (1992); cervical fusion (08/25/2021); and back surgery (2013).     Family History:  [unfilled]     Social History:  [unfilled]    Review of Systems:       Physical Exam:    General:   Alert and oriented    Gait:          Normal gait    Back:        No tenderness over lower back.     RIGHT Hip:    Skin is intact. No pain with palpation over the greater trochanter.          No groin pain and restricted ROM of the hip     RIGHT Knee: Skin: normal                    Effusion: no                    Tenderness to palpation: Lateral joint line                    Patella grind test: Positive                    Stability:  Valgus: yes Varus: yes AP: yes                    Emanuel's test: negative                    Quad Strength: good                    ROM   Extension: 0 Flexion: 125                    Popliteal cyst : no                    Calf pain : no                    Edema in the leg(s): none noted    Neurovascular:  Patient has good pulses distally. They have intact motor and sensory function.     X-rays of the RIGHT knee:  AP, lateral, merchant and Skiers are independently reviewed by me today and shows: Joint spacing is well-maintained however there is evidence of a nondisplaced lateral tibial plateau fracture noted on AP and skiers views.  X-rays were also evaluated from the ER on 12/23/2024 showing evidence of

## 2025-01-15 ENCOUNTER — HOSPITAL ENCOUNTER (OUTPATIENT)
Dept: PHYSICAL THERAPY | Age: 80
Setting detail: RECURRING SERIES
Discharge: HOME | End: 2025-01-18
Payer: MEDICARE

## 2025-01-15 PROCEDURE — 97110 THERAPEUTIC EXERCISES: CPT

## 2025-01-15 ASSESSMENT — PAIN SCALES - GENERAL: PAINLEVEL_OUTOF10: 4

## 2025-01-15 NOTE — PROGRESS NOTES
muscle recruitment and technique with exercises.     MANUAL THERAPY: (0 minutes):   Joint mobilization and Soft tissue mobilization was utilized and necessary because of the patient's restricted joint motion, painful spasm, loss of articular motion, and restricted motion of soft tissue.   Hip IR/ER Mobilization Supine Long Axis Grade II/III  Short Axis Traction Supine    MODALITIES: (0 minutes):        None Today      HEP: As above; handouts given to patient for all exercises.     Treatment/Session Summary:    Treatment Assessment:   Patient will be discharged at this time to her HEP, secondary to patient having surgery next week. Her HEP was reviewed and progressed.   Communication/Consultation:   HEP  Equipment provided today:  HEP and Posture  Recommendations: Discharge to [facility].    >Total Treatment Billable Duration:  53 minutes  Time In: 1329  Time Out: 1425    Amado Gonzalez PT         Charge Capture  Events  [facility] Portal  Appt Desk  Attendance Report     Future Appointments   Date Time Provider Department Center   2/10/2025 10:10 AM Shree Carr MD [facility]

## 2025-01-15 NOTE — THERAPY DISCHARGE
Angle Morgan  : 1945  Primary: [place] (Medicare Managed)  Secondary:  [facility] [place]  [place] [facility] [address]  Phone: [phone]  Fax: [phone] Plan Frequency: 2 times per week for 60 days (Decreasing frequency to 1 time per week as medically appropriate)    Plan of Care/Certification Expiration Date: 25 (Plan of Care Start Date 2024)        Plan of Care/Certification Expiration Date:  Plan of Care/Certification Expiration Date: 25 (Plan of Care Start Date 2024)    Frequency/Duration: Plan Frequency: 2 times per week for 60 days (Decreasing frequency to 1 time per week as medically appropriate)      Time In/Out:   Time In: 1329  Time Out: 1425      PT Visit Info:    Progress Note Counter: 7      Visit Count:  7                OUTPATIENT PHYSICAL THERAPY:             Discharge Summary 1/15/2025               Episode (Low Back, SI, Right LE Pain, Weakness, Difficulty in Walking)         Treatment Diagnosis:     Radiculopathy, lumbar region  SI (sacroiliac) joint dysfunction  Muscle weakness (generalized)  Pain in right hip  Difficulty in walking, not elsewhere classified  Medical/Referring Diagnosis:    Weakness of right hip  Spinal stenosis, lumbar region with neurogenic claudication  S/P lumbar fusion  Renal cyst, right  Referring Physician:  Ellis Tomlinson APRN - CNP  MD Orders:  PT Eval and Treat   Return MD Appt:  2025  Date of On+set:  Onset Date: 13     Allergies:  Patient has no known allergies.  Restrictions/Precautions:    History of L2 - S1 Lumbar Fusion , Arthritis, Incontinence, Osteoporosis, Left Reverse Shoulder       Medications Last Reviewed:  1/15/2025     SUBJECTIVE   Patient to be discharged to [facility], per patient request, secondary to having back surgery 2025.     OBJECTIVE     Patient denies any LE paresthesia. Patient denies any increase

## 2025-01-21 ENCOUNTER — APPOINTMENT (OUTPATIENT)
Dept: GENERAL RADIOLOGY | Age: 80
End: 2025-01-21
Attending: ORTHOPAEDIC SURGERY
Payer: MEDICARE

## 2025-01-21 ENCOUNTER — ANESTHESIA EVENT (OUTPATIENT)
Dept: SURGERY | Age: 80
End: 2025-01-21
Payer: MEDICARE

## 2025-01-21 ENCOUNTER — ANESTHESIA (OUTPATIENT)
Dept: SURGERY | Age: 80
End: 2025-01-21
Payer: MEDICARE

## 2025-01-21 ENCOUNTER — HOSPITAL ENCOUNTER (OUTPATIENT)
Age: 80
Discharge: HOME | End: 2025-01-23
Attending: ORTHOPAEDIC SURGERY | Admitting: ORTHOPAEDIC SURGERY
Payer: MEDICARE

## 2025-01-21 DIAGNOSIS — Z98.1 HISTORY OF LUMBAR SPINAL FUSION: Primary | ICD-10-CM

## 2025-01-21 LAB
ABO + RH BLD: NORMAL
BLOOD GROUP ANTIBODIES SERPL: NORMAL
SPECIMEN EXP DATE BLD: NORMAL

## 2025-01-21 PROCEDURE — 3700000001 HC ADD 15 MINUTES (ANESTHESIA): Performed by: ORTHOPAEDIC SURGERY

## 2025-01-21 PROCEDURE — P9045 ALBUMIN (HUMAN), 5%, 250 ML: HCPCS | Performed by: NURSE ANESTHETIST, CERTIFIED REGISTERED

## 2025-01-21 PROCEDURE — 6360000002 HC RX W HCPCS: Performed by: ORTHOPAEDIC SURGERY

## 2025-01-21 PROCEDURE — 2580000003 HC RX 258: Performed by: ANESTHESIOLOGY

## 2025-01-21 PROCEDURE — 6370000000 HC RX 637 (ALT 250 FOR IP): Performed by: ORTHOPAEDIC SURGERY

## 2025-01-21 PROCEDURE — 2580000003 HC RX 258: Performed by: NURSE ANESTHETIST, CERTIFIED REGISTERED

## 2025-01-21 PROCEDURE — 2500000003 HC RX 250 WO HCPCS: Performed by: NURSE ANESTHETIST, CERTIFIED REGISTERED

## 2025-01-21 PROCEDURE — 72100 X-RAY EXAM L-S SPINE 2/3 VWS: CPT

## 2025-01-21 PROCEDURE — 2500000003 HC RX 250 WO HCPCS: Performed by: ORTHOPAEDIC SURGERY

## 2025-01-21 PROCEDURE — 7100000001 HC PACU RECOVERY - ADDTL 15 MIN: Performed by: ORTHOPAEDIC SURGERY

## 2025-01-21 PROCEDURE — 6360000002 HC RX W HCPCS: Performed by: NURSE ANESTHETIST, CERTIFIED REGISTERED

## 2025-01-21 PROCEDURE — 2580000003 HC RX 258: Performed by: ORTHOPAEDIC SURGERY

## 2025-01-21 PROCEDURE — 6360000002 HC RX W HCPCS: Performed by: ANESTHESIOLOGY

## 2025-01-21 PROCEDURE — 86900 BLOOD TYPING SEROLOGIC ABO: CPT

## 2025-01-21 PROCEDURE — 2700000000 HC OXYGEN THERAPY PER DAY

## 2025-01-21 PROCEDURE — 3600000004 HC SURGERY LEVEL 4 BASE: Performed by: ORTHOPAEDIC SURGERY

## 2025-01-21 PROCEDURE — 86850 RBC ANTIBODY SCREEN: CPT

## 2025-01-21 PROCEDURE — 3700000000 HC ANESTHESIA ATTENDED CARE: Performed by: ORTHOPAEDIC SURGERY

## 2025-01-21 PROCEDURE — C1713 ANCHOR/SCREW BN/BN,TIS/BN: HCPCS | Performed by: ORTHOPAEDIC SURGERY

## 2025-01-21 PROCEDURE — 2709999900 HC NON-CHARGEABLE SUPPLY: Performed by: ORTHOPAEDIC SURGERY

## 2025-01-21 PROCEDURE — 3600000014 HC SURGERY LEVEL 4 ADDTL 15MIN: Performed by: ORTHOPAEDIC SURGERY

## 2025-01-21 PROCEDURE — C1889 IMPLANT/INSERT DEVICE, NOC: HCPCS | Performed by: ORTHOPAEDIC SURGERY

## 2025-01-21 PROCEDURE — 86901 BLOOD TYPING SEROLOGIC RH(D): CPT

## 2025-01-21 PROCEDURE — 94760 N-INVAS EAR/PLS OXIMETRY 1: CPT

## 2025-01-21 PROCEDURE — 2720000010 HC SURG SUPPLY STERILE: Performed by: ORTHOPAEDIC SURGERY

## 2025-01-21 PROCEDURE — 7100000000 HC PACU RECOVERY - FIRST 15 MIN: Performed by: ORTHOPAEDIC SURGERY

## 2025-01-21 DEVICE — POLYAXIAL SCREW
Type: IMPLANTABLE DEVICE | Site: SPINE LUMBAR | Status: FUNCTIONAL
Brand: XIA 3 SYSTEM - SERRATO

## 2025-01-21 DEVICE — ALLOGRAFT BNE CHIP 1-4 MM 5 CC CRUSH CANC: Type: IMPLANTABLE DEVICE | Site: SPINE LUMBAR | Status: FUNCTIONAL

## 2025-01-21 DEVICE — BIO DBM PLUS PUTTY (WITH CANCELLOUS)
Type: IMPLANTABLE DEVICE | Site: SPINE LUMBAR | Status: FUNCTIONAL
Brand: BIO DBM

## 2025-01-21 DEVICE — GRAFT BNE MED: Type: IMPLANTABLE DEVICE | Site: SPINE LUMBAR | Status: FUNCTIONAL

## 2025-01-21 DEVICE — TI ALLOY ROD
Type: IMPLANTABLE DEVICE | Site: SPINE LUMBAR | Status: FUNCTIONAL
Brand: XIA 3

## 2025-01-21 DEVICE — BLOCKER
Type: IMPLANTABLE DEVICE | Site: SPINE LUMBAR | Status: FUNCTIONAL
Brand: XIA 3

## 2025-01-21 RX ORDER — ONDANSETRON 2 MG/ML
INJECTION INTRAMUSCULAR; INTRAVENOUS
Status: DISCONTINUED | OUTPATIENT
Start: 2025-01-21 | End: 2025-01-21 | Stop reason: SDUPTHER

## 2025-01-21 RX ORDER — OXYCODONE HYDROCHLORIDE 5 MG/1
5 TABLET ORAL EVERY 6 HOURS PRN
Qty: 28 TABLET | Refills: 0 | Status: SHIPPED | OUTPATIENT
Start: 2025-01-21 | End: 2025-01-28

## 2025-01-21 RX ORDER — OXYCODONE HYDROCHLORIDE 5 MG/1
5 TABLET ORAL
Status: DISCONTINUED | OUTPATIENT
Start: 2025-01-21 | End: 2025-01-21 | Stop reason: HOSPADM

## 2025-01-21 RX ORDER — VANCOMYCIN HYDROCHLORIDE 1 G/20ML
INJECTION, POWDER, LYOPHILIZED, FOR SOLUTION INTRAVENOUS PRN
Status: DISCONTINUED | OUTPATIENT
Start: 2025-01-21 | End: 2025-01-21 | Stop reason: HOSPADM

## 2025-01-21 RX ORDER — ONDANSETRON 2 MG/ML
4 INJECTION INTRAMUSCULAR; INTRAVENOUS
Status: DISCONTINUED | OUTPATIENT
Start: 2025-01-21 | End: 2025-01-21 | Stop reason: HOSPADM

## 2025-01-21 RX ORDER — NALOXONE HYDROCHLORIDE 0.4 MG/ML
INJECTION, SOLUTION INTRAMUSCULAR; INTRAVENOUS; SUBCUTANEOUS PRN
Status: DISCONTINUED | OUTPATIENT
Start: 2025-01-21 | End: 2025-01-21 | Stop reason: HOSPADM

## 2025-01-21 RX ORDER — BISACODYL 5 MG/1
5 TABLET, DELAYED RELEASE ORAL DAILY
Status: DISCONTINUED | OUTPATIENT
Start: 2025-01-22 | End: 2025-01-23 | Stop reason: HOSPADM

## 2025-01-21 RX ORDER — PAROXETINE 20 MG/1
10 TABLET, FILM COATED ORAL DAILY
Status: DISCONTINUED | OUTPATIENT
Start: 2025-01-22 | End: 2025-01-23 | Stop reason: HOSPADM

## 2025-01-21 RX ORDER — SODIUM CHLORIDE 9 MG/ML
INJECTION, SOLUTION INTRAVENOUS PRN
Status: DISCONTINUED | OUTPATIENT
Start: 2025-01-21 | End: 2025-01-23 | Stop reason: HOSPADM

## 2025-01-21 RX ORDER — SODIUM CHLORIDE 0.9 % (FLUSH) 0.9 %
5-40 SYRINGE (ML) INJECTION EVERY 12 HOURS SCHEDULED
Status: DISCONTINUED | OUTPATIENT
Start: 2025-01-21 | End: 2025-01-23 | Stop reason: HOSPADM

## 2025-01-21 RX ORDER — OXYCODONE HYDROCHLORIDE 5 MG/1
5 TABLET ORAL EVERY 4 HOURS PRN
Status: DISCONTINUED | OUTPATIENT
Start: 2025-01-21 | End: 2025-01-23 | Stop reason: HOSPADM

## 2025-01-21 RX ORDER — SODIUM CHLORIDE, SODIUM LACTATE, POTASSIUM CHLORIDE, CALCIUM CHLORIDE 600; 310; 30; 20 MG/100ML; MG/100ML; MG/100ML; MG/100ML
INJECTION, SOLUTION INTRAVENOUS
Status: DISCONTINUED | OUTPATIENT
Start: 2025-01-21 | End: 2025-01-21 | Stop reason: SDUPTHER

## 2025-01-21 RX ORDER — HYDROMORPHONE HYDROCHLORIDE 1 MG/ML
0.5 INJECTION, SOLUTION INTRAMUSCULAR; INTRAVENOUS; SUBCUTANEOUS
Status: DISCONTINUED | OUTPATIENT
Start: 2025-01-21 | End: 2025-01-23 | Stop reason: HOSPADM

## 2025-01-21 RX ORDER — SODIUM CHLORIDE 9 MG/ML
INJECTION, SOLUTION INTRAVENOUS CONTINUOUS
Status: ACTIVE | OUTPATIENT
Start: 2025-01-21 | End: 2025-01-22

## 2025-01-21 RX ORDER — KETOROLAC TROMETHAMINE 15 MG/ML
15 INJECTION, SOLUTION INTRAMUSCULAR; INTRAVENOUS EVERY 6 HOURS
Status: DISCONTINUED | OUTPATIENT
Start: 2025-01-22 | End: 2025-01-21

## 2025-01-21 RX ORDER — CYCLOBENZAPRINE HCL 10 MG
10 TABLET ORAL 3 TIMES DAILY PRN
Status: DISCONTINUED | OUTPATIENT
Start: 2025-01-21 | End: 2025-01-23 | Stop reason: HOSPADM

## 2025-01-21 RX ORDER — ACETAMINOPHEN 325 MG/1
650 TABLET ORAL EVERY 6 HOURS
Status: DISCONTINUED | OUTPATIENT
Start: 2025-01-22 | End: 2025-01-23 | Stop reason: HOSPADM

## 2025-01-21 RX ORDER — SODIUM CHLORIDE, SODIUM LACTATE, POTASSIUM CHLORIDE, CALCIUM CHLORIDE 600; 310; 30; 20 MG/100ML; MG/100ML; MG/100ML; MG/100ML
INJECTION, SOLUTION INTRAVENOUS CONTINUOUS
Status: DISCONTINUED | OUTPATIENT
Start: 2025-01-21 | End: 2025-01-21 | Stop reason: HOSPADM

## 2025-01-21 RX ORDER — DEXMEDETOMIDINE HYDROCHLORIDE 100 UG/ML
INJECTION, SOLUTION INTRAVENOUS
Status: DISCONTINUED | OUTPATIENT
Start: 2025-01-21 | End: 2025-01-21 | Stop reason: SDUPTHER

## 2025-01-21 RX ORDER — ALBUMIN HUMAN 50 G/1000ML
SOLUTION INTRAVENOUS
Status: DISCONTINUED | OUTPATIENT
Start: 2025-01-21 | End: 2025-01-21 | Stop reason: SDUPTHER

## 2025-01-21 RX ORDER — DEXAMETHASONE SODIUM PHOSPHATE 4 MG/ML
INJECTION, SOLUTION INTRA-ARTICULAR; INTRALESIONAL; INTRAMUSCULAR; INTRAVENOUS; SOFT TISSUE
Status: DISCONTINUED | OUTPATIENT
Start: 2025-01-21 | End: 2025-01-21 | Stop reason: SDUPTHER

## 2025-01-21 RX ORDER — ACETAMINOPHEN 325 MG/1
650 TABLET ORAL EVERY 6 HOURS
Status: DISCONTINUED | OUTPATIENT
Start: 2025-01-21 | End: 2025-01-21

## 2025-01-21 RX ORDER — ONDANSETRON 2 MG/ML
4 INJECTION INTRAMUSCULAR; INTRAVENOUS EVERY 6 HOURS PRN
Status: DISCONTINUED | OUTPATIENT
Start: 2025-01-21 | End: 2025-01-23 | Stop reason: HOSPADM

## 2025-01-21 RX ORDER — SODIUM CHLORIDE 0.9 % (FLUSH) 0.9 %
5-40 SYRINGE (ML) INJECTION EVERY 12 HOURS SCHEDULED
Status: DISCONTINUED | OUTPATIENT
Start: 2025-01-21 | End: 2025-01-21 | Stop reason: HOSPADM

## 2025-01-21 RX ORDER — SODIUM CHLORIDE 0.9 % (FLUSH) 0.9 %
5-40 SYRINGE (ML) INJECTION PRN
Status: DISCONTINUED | OUTPATIENT
Start: 2025-01-21 | End: 2025-01-21 | Stop reason: HOSPADM

## 2025-01-21 RX ORDER — DIPHENHYDRAMINE HYDROCHLORIDE 50 MG/ML
25 INJECTION INTRAMUSCULAR; INTRAVENOUS EVERY 6 HOURS PRN
Status: DISCONTINUED | OUTPATIENT
Start: 2025-01-21 | End: 2025-01-23 | Stop reason: HOSPADM

## 2025-01-21 RX ORDER — LIDOCAINE HYDROCHLORIDE ANHYDROUS AND DEXTROSE MONOHYDRATE 5; 400 G/100ML; MG/100ML
1 INJECTION, SOLUTION INTRAVENOUS CONTINUOUS
Status: ACTIVE | OUTPATIENT
Start: 2025-01-21 | End: 2025-01-22

## 2025-01-21 RX ORDER — OXYCODONE HYDROCHLORIDE 5 MG/1
10 TABLET ORAL EVERY 4 HOURS PRN
Status: DISCONTINUED | OUTPATIENT
Start: 2025-01-21 | End: 2025-01-23 | Stop reason: HOSPADM

## 2025-01-21 RX ORDER — PROMETHAZINE HYDROCHLORIDE 12.5 MG/1
12.5 TABLET ORAL EVERY 6 HOURS PRN
Status: DISCONTINUED | OUTPATIENT
Start: 2025-01-21 | End: 2025-01-23 | Stop reason: HOSPADM

## 2025-01-21 RX ORDER — LIDOCAINE HYDROCHLORIDE ANHYDROUS AND DEXTROSE MONOHYDRATE 5; 400 G/100ML; MG/100ML
INJECTION, SOLUTION INTRAVENOUS
Status: DISCONTINUED | OUTPATIENT
Start: 2025-01-21 | End: 2025-01-21 | Stop reason: SDUPTHER

## 2025-01-21 RX ORDER — PROPOFOL 10 MG/ML
INJECTION, EMULSION INTRAVENOUS
Status: DISCONTINUED | OUTPATIENT
Start: 2025-01-21 | End: 2025-01-21 | Stop reason: SDUPTHER

## 2025-01-21 RX ORDER — ACETAMINOPHEN 500 MG
1000 TABLET ORAL ONCE
Status: COMPLETED | OUTPATIENT
Start: 2025-01-21 | End: 2025-01-21

## 2025-01-21 RX ORDER — TROSPIUM CHLORIDE 20 MG/1
20 TABLET, FILM COATED ORAL NIGHTLY
Status: DISCONTINUED | OUTPATIENT
Start: 2025-01-21 | End: 2025-01-23 | Stop reason: HOSPADM

## 2025-01-21 RX ORDER — SODIUM CHLORIDE 0.9 % (FLUSH) 0.9 %
5-40 SYRINGE (ML) INJECTION PRN
Status: DISCONTINUED | OUTPATIENT
Start: 2025-01-21 | End: 2025-01-23 | Stop reason: HOSPADM

## 2025-01-21 RX ORDER — ROCURONIUM BROMIDE 10 MG/ML
INJECTION, SOLUTION INTRAVENOUS
Status: DISCONTINUED | OUTPATIENT
Start: 2025-01-21 | End: 2025-01-21 | Stop reason: SDUPTHER

## 2025-01-21 RX ORDER — LIDOCAINE HYDROCHLORIDE 10 MG/ML
1 INJECTION, SOLUTION INFILTRATION; PERINEURAL
Status: DISCONTINUED | OUTPATIENT
Start: 2025-01-21 | End: 2025-01-21 | Stop reason: HOSPADM

## 2025-01-21 RX ORDER — ROSUVASTATIN CALCIUM 5 MG/1
5 TABLET, COATED ORAL NIGHTLY
Status: DISCONTINUED | OUTPATIENT
Start: 2025-01-21 | End: 2025-01-23 | Stop reason: HOSPADM

## 2025-01-21 RX ORDER — KETAMINE HCL IN NACL, ISO-OSM 20 MG/2 ML
SYRINGE (ML) INJECTION
Status: DISCONTINUED | OUTPATIENT
Start: 2025-01-21 | End: 2025-01-21 | Stop reason: SDUPTHER

## 2025-01-21 RX ORDER — DIPHENHYDRAMINE HCL 25 MG
25 CAPSULE ORAL EVERY 6 HOURS PRN
Status: DISCONTINUED | OUTPATIENT
Start: 2025-01-21 | End: 2025-01-23 | Stop reason: HOSPADM

## 2025-01-21 RX ORDER — ACETAMINOPHEN 500 MG
1000 TABLET ORAL ONCE
Status: DISCONTINUED | OUTPATIENT
Start: 2025-01-21 | End: 2025-01-21 | Stop reason: HOSPADM

## 2025-01-21 RX ORDER — POLYETHYLENE GLYCOL 3350 17 G/17G
17 POWDER, FOR SOLUTION ORAL DAILY
Status: DISCONTINUED | OUTPATIENT
Start: 2025-01-22 | End: 2025-01-23 | Stop reason: HOSPADM

## 2025-01-21 RX ORDER — EPHEDRINE SULFATE 5 MG/ML
INJECTION INTRAVENOUS
Status: DISCONTINUED | OUTPATIENT
Start: 2025-01-21 | End: 2025-01-21 | Stop reason: SDUPTHER

## 2025-01-21 RX ORDER — TRANEXAMIC ACID 100 MG/ML
INJECTION, SOLUTION INTRAVENOUS
Status: DISCONTINUED | OUTPATIENT
Start: 2025-01-21 | End: 2025-01-21 | Stop reason: SDUPTHER

## 2025-01-21 RX ORDER — SODIUM CHLORIDE 9 MG/ML
INJECTION, SOLUTION INTRAVENOUS PRN
Status: DISCONTINUED | OUTPATIENT
Start: 2025-01-21 | End: 2025-01-21 | Stop reason: HOSPADM

## 2025-01-21 RX ORDER — KETOROLAC TROMETHAMINE 15 MG/ML
15 INJECTION, SOLUTION INTRAMUSCULAR; INTRAVENOUS EVERY 6 HOURS
Status: COMPLETED | OUTPATIENT
Start: 2025-01-21 | End: 2025-01-22

## 2025-01-21 RX ORDER — LIDOCAINE HYDROCHLORIDE 20 MG/ML
INJECTION, SOLUTION EPIDURAL; INFILTRATION; INTRACAUDAL; PERINEURAL
Status: DISCONTINUED | OUTPATIENT
Start: 2025-01-21 | End: 2025-01-21 | Stop reason: SDUPTHER

## 2025-01-21 RX ORDER — PANTOPRAZOLE SODIUM 40 MG/1
40 TABLET, DELAYED RELEASE ORAL DAILY
Status: DISCONTINUED | OUTPATIENT
Start: 2025-01-22 | End: 2025-01-23 | Stop reason: HOSPADM

## 2025-01-21 RX ADMIN — SODIUM CHLORIDE, PRESERVATIVE FREE 10 ML: 5 INJECTION INTRAVENOUS at 21:35

## 2025-01-21 RX ADMIN — PROPOFOL 120 MG: 10 INJECTION, EMULSION INTRAVENOUS at 14:07

## 2025-01-21 RX ADMIN — DEXAMETHASONE SODIUM PHOSPHATE 4 MG: 4 INJECTION INTRA-ARTICULAR; INTRALESIONAL; INTRAMUSCULAR; INTRAVENOUS; SOFT TISSUE at 14:15

## 2025-01-21 RX ADMIN — ALBUMIN (HUMAN) 12.5 G: 12.5 SOLUTION INTRAVENOUS at 15:50

## 2025-01-21 RX ADMIN — HYDROMORPHONE HYDROCHLORIDE 0.2 MG: 0.5 INJECTION, SOLUTION INTRAMUSCULAR; INTRAVENOUS; SUBCUTANEOUS at 17:01

## 2025-01-21 RX ADMIN — PHENYLEPHRINE HYDROCHLORIDE 50 MCG: 10 INJECTION INTRAVENOUS at 14:47

## 2025-01-21 RX ADMIN — DEXMEDETOMIDINE 4 MCG: 100 INJECTION, SOLUTION, CONCENTRATE INTRAVENOUS at 16:22

## 2025-01-21 RX ADMIN — SODIUM CHLORIDE, SODIUM LACTATE, POTASSIUM CHLORIDE, AND CALCIUM CHLORIDE: 600; 310; 30; 20 INJECTION, SOLUTION INTRAVENOUS at 14:03

## 2025-01-21 RX ADMIN — DEXMEDETOMIDINE 4 MCG: 100 INJECTION, SOLUTION, CONCENTRATE INTRAVENOUS at 16:25

## 2025-01-21 RX ADMIN — SODIUM CHLORIDE: 9 INJECTION, SOLUTION INTRAVENOUS at 21:33

## 2025-01-21 RX ADMIN — CEFAZOLIN 2000 MG: 2 INJECTION, POWDER, FOR SOLUTION INTRAMUSCULAR; INTRAVENOUS at 14:20

## 2025-01-21 RX ADMIN — DEXMEDETOMIDINE 4 MCG: 100 INJECTION, SOLUTION, CONCENTRATE INTRAVENOUS at 16:18

## 2025-01-21 RX ADMIN — EPHEDRINE SULFATE 5 MG: 5 INJECTION INTRAVENOUS at 16:43

## 2025-01-21 RX ADMIN — LIDOCAINE HYDROCHLORIDE 80 MG: 20 INJECTION, SOLUTION EPIDURAL; INFILTRATION; INTRACAUDAL; PERINEURAL at 14:07

## 2025-01-21 RX ADMIN — PHENYLEPHRINE HYDROCHLORIDE 100 MCG: 10 INJECTION INTRAVENOUS at 14:35

## 2025-01-21 RX ADMIN — KETOROLAC TROMETHAMINE 15 MG: 15 INJECTION, SOLUTION INTRAMUSCULAR; INTRAVENOUS at 21:33

## 2025-01-21 RX ADMIN — Medication 3 AMPULE: at 10:35

## 2025-01-21 RX ADMIN — SODIUM CHLORIDE, SODIUM LACTATE, POTASSIUM CHLORIDE, AND CALCIUM CHLORIDE: 600; 310; 30; 20 INJECTION, SOLUTION INTRAVENOUS at 15:47

## 2025-01-21 RX ADMIN — PHENYLEPHRINE HYDROCHLORIDE 50 MCG: 10 INJECTION INTRAVENOUS at 16:43

## 2025-01-21 RX ADMIN — TRANEXAMIC ACID 1000 MG: 100 INJECTION, SOLUTION INTRAVENOUS at 14:20

## 2025-01-21 RX ADMIN — EPHEDRINE SULFATE 5 MG: 5 INJECTION INTRAVENOUS at 14:35

## 2025-01-21 RX ADMIN — SUGAMMADEX 200 MG: 100 INJECTION, SOLUTION INTRAVENOUS at 16:56

## 2025-01-21 RX ADMIN — WATER 2000 MG: 1 INJECTION INTRAMUSCULAR; INTRAVENOUS; SUBCUTANEOUS at 21:33

## 2025-01-21 RX ADMIN — PHENYLEPHRINE HYDROCHLORIDE 100 MCG: 10 INJECTION INTRAVENOUS at 14:12

## 2025-01-21 RX ADMIN — ONDANSETRON 4 MG: 2 INJECTION INTRAMUSCULAR; INTRAVENOUS at 16:11

## 2025-01-21 RX ADMIN — ROCURONIUM BROMIDE 40 MG: 10 INJECTION, SOLUTION INTRAVENOUS at 14:07

## 2025-01-21 RX ADMIN — EPHEDRINE SULFATE 5 MG: 5 INJECTION INTRAVENOUS at 14:47

## 2025-01-21 RX ADMIN — HYDROMORPHONE HYDROCHLORIDE 0.2 MG: 0.5 INJECTION, SOLUTION INTRAMUSCULAR; INTRAVENOUS; SUBCUTANEOUS at 17:03

## 2025-01-21 RX ADMIN — EPHEDRINE SULFATE 5 MG: 5 INJECTION INTRAVENOUS at 16:52

## 2025-01-21 RX ADMIN — ROCURONIUM BROMIDE 10 MG: 10 INJECTION, SOLUTION INTRAVENOUS at 15:30

## 2025-01-21 RX ADMIN — SODIUM CHLORIDE 150 MG: 9 INJECTION, SOLUTION INTRAVENOUS at 10:33

## 2025-01-21 RX ADMIN — ACETAMINOPHEN 650 MG: 325 TABLET ORAL at 22:09

## 2025-01-21 RX ADMIN — TRANEXAMIC ACID 1000 MG: 100 INJECTION, SOLUTION INTRAVENOUS at 16:20

## 2025-01-21 RX ADMIN — HYDROMORPHONE HYDROCHLORIDE 0.2 MG: 0.5 INJECTION, SOLUTION INTRAMUSCULAR; INTRAVENOUS; SUBCUTANEOUS at 17:00

## 2025-01-21 RX ADMIN — HYDROMORPHONE HYDROCHLORIDE 0.25 MG: 0.5 INJECTION, SOLUTION INTRAMUSCULAR; INTRAVENOUS; SUBCUTANEOUS at 17:57

## 2025-01-21 RX ADMIN — DEXMEDETOMIDINE 4 MCG: 100 INJECTION, SOLUTION, CONCENTRATE INTRAVENOUS at 16:13

## 2025-01-21 RX ADMIN — PHENYLEPHRINE HYDROCHLORIDE 50 MCG: 10 INJECTION INTRAVENOUS at 16:52

## 2025-01-21 RX ADMIN — HYDROMORPHONE HYDROCHLORIDE 0.2 MG: 0.5 INJECTION, SOLUTION INTRAMUSCULAR; INTRAVENOUS; SUBCUTANEOUS at 17:02

## 2025-01-21 RX ADMIN — FENTANYL CITRATE 100 MCG: 50 INJECTION INTRAMUSCULAR; INTRAVENOUS at 14:07

## 2025-01-21 RX ADMIN — EPHEDRINE SULFATE 5 MG: 5 INJECTION INTRAVENOUS at 15:40

## 2025-01-21 RX ADMIN — HYDROMORPHONE HYDROCHLORIDE 0.2 MG: 0.5 INJECTION, SOLUTION INTRAMUSCULAR; INTRAVENOUS; SUBCUTANEOUS at 16:03

## 2025-01-21 RX ADMIN — PHENYLEPHRINE HYDROCHLORIDE 100 MCG: 10 INJECTION INTRAVENOUS at 14:24

## 2025-01-21 RX ADMIN — TROSPIUM CHLORIDE 20 MG: 20 TABLET, FILM COATED ORAL at 21:40

## 2025-01-21 RX ADMIN — LIDOCAINE HYDROCHLORIDE 1 MG/KG/HR: 4 INJECTION, SOLUTION INTRAVENOUS at 14:17

## 2025-01-21 RX ADMIN — ROCURONIUM BROMIDE 10 MG: 10 INJECTION, SOLUTION INTRAVENOUS at 14:45

## 2025-01-21 RX ADMIN — Medication 20 MG: at 14:07

## 2025-01-21 RX ADMIN — ROSUVASTATIN CALCIUM 5 MG: 5 TABLET, FILM COATED ORAL at 21:40

## 2025-01-21 RX ADMIN — ACETAMINOPHEN 1000 MG: 500 TABLET, FILM COATED ORAL at 10:33

## 2025-01-21 ASSESSMENT — PAIN DESCRIPTION - DESCRIPTORS
DESCRIPTORS: DISCOMFORT
DESCRIPTORS: ACHING;TIGHTNESS

## 2025-01-21 ASSESSMENT — PAIN DESCRIPTION - ORIENTATION
ORIENTATION: MID;LOWER
ORIENTATION: POSTERIOR

## 2025-01-21 ASSESSMENT — PAIN DESCRIPTION - PAIN TYPE
TYPE: SURGICAL PAIN
TYPE: SURGICAL PAIN

## 2025-01-21 ASSESSMENT — PAIN DESCRIPTION - ONSET: ONSET: GRADUAL

## 2025-01-21 ASSESSMENT — PAIN SCALES - GENERAL
PAINLEVEL_OUTOF10: 0
PAINLEVEL_OUTOF10: 6
PAINLEVEL_OUTOF10: 8

## 2025-01-21 ASSESSMENT — PAIN DESCRIPTION - LOCATION
LOCATION: BACK
LOCATION: BACK

## 2025-01-21 ASSESSMENT — PAIN DESCRIPTION - FREQUENCY: FREQUENCY: INTERMITTENT

## 2025-01-21 ASSESSMENT — PAIN - FUNCTIONAL ASSESSMENT
PAIN_FUNCTIONAL_ASSESSMENT: 0-10
PAIN_FUNCTIONAL_ASSESSMENT: PREVENTS OR INTERFERES SOME ACTIVE ACTIVITIES AND ADLS

## 2025-01-21 NOTE — ANESTHESIA PRE PROCEDURE
mL IntraVENous 2 times per day Lincoln Lazcano MD       • sodium chloride flush 0.9 % injection 5-40 mL  5-40 mL IntraVENous PRN Lincoln Lazcano MD       • 0.9 % sodium chloride infusion   IntraVENous PRN Lincoln Lazcano MD       • alcohol 62% (NOZIN) nasal  3 ampule  3 ampule Nasal Once Phoenix Ramires MD       • ceFAZolin (ANCEF) 2,000 mg in sterile water 20 mL IV syringe  2,000 mg IntraVENous On Call to OR Phoenix Ramires MD       • acetaminophen (TYLENOL) tablet 1,000 mg  1,000 mg Oral Once Phoenix Ramires MD           Allergies:  No Known Allergies    Problem List:    Patient Active Problem List   Diagnosis Code   • Hormone replacement therapy (postmenopausal) Z79.890   • Chronic pain G89.29   • Localized osteoarthritis of left shoulder M19.012   • Spinal stenosis, lumbar region, with neurogenic claudication M48.062   • Osteoarthritis of left shoulder M19.012   • Arthritis M19.90   • Cervical spinal stenosis M48.02   • Acquired spondylolisthesis M43.10   • Pneumothorax on left J93.9   • Pain in both hands M79.641, M79.642   • Arthritis of carpometacarpal (CMC) joint of left thumb M18.12   • Dupuytren's contracture of left hand M72.0   • Primary osteoarthritis of first carpometacarpal joint of right hand M18.11   • Synovial cyst of lumbar spine M71.38   • History of lumbar spinal fusion Z98.1       Past Medical History:        Diagnosis Date   • Anxiety     managed with med   • Arthritis     osteo   • Chronic pain     back and feet pain   • COVID-19 vaccine series completed 02/2021    Phizer   • Former smoker quit 1985    15 yrs 1 1/2 pk per day   • GERD (gastroesophageal reflux disease)     managed with med   • Hormone replacement therapy (postmenopausal)    • Nausea & vomiting 08/25/2021    with ACDF   • PONV (postoperative nausea and vomiting)        Past Surgical History:        Procedure Laterality Date   • BACK SURGERY  2013    rhizotomy   • BLADDER REPAIR  2006    bladder tac

## 2025-01-21 NOTE — ANESTHESIA PROCEDURE NOTES
Airway  Date/Time: 1/21/2025 2:10 PM  Urgency: elective    Airway not difficult    General Information and Staff    Patient location during procedure: OR  Resident/CRNA: Ree Peña APRN - CRNA  Performed: resident/CRNA   Performed by: Ree Peña APRN - CRNA  Authorized by: Klarissa Lacey MD      Indications and Patient Condition  Indications for airway management: anesthesia  Spontaneous Ventilation: absent  Sedation level: deep  Preoxygenated: yes  Patient position: sniffing  MILS not maintained throughout  Mask difficulty assessment: vent by bag mask + OA or adjuvant +/- NMBA    Final Airway Details  Final airway type: endotracheal airway      Successful airway: ETT  Cuffed: yes   Successful intubation technique: video laryngoscopy  Facilitating devices/methods: intubating stylet  Endotracheal tube insertion site: oral  Blade: Merlos  Blade size: #3  ETT size (mm): 7.0  Cormack-Lehane Classification: grade I - full view of glottis  Placement verified by: chest auscultation and capnometry   Measured from: lips  Number of attempts at approach: 1  Ventilation between attempts: bag mask  Number of other approaches attempted: 0    no

## 2025-01-21 NOTE — OP NOTE
[facility] [id number]   [phone]    OPERATIVE REPORT    Patient ID:Angle Morgan  [id number]  1945  79 y.o.    DATE OF SURGERY: 1/21/2025    SURGEON: Phoenix Chang MD    PREOP DIAGNOSIS:     1. Lumbar extradural lesion L1-2 adjacent to L2-S1 fusion and instrumentation    POSTOP DIAGNOSIS:     1. 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 Posterolateral lumbar fusion L1-L2 (CPT 86915)  2. Lumbar laminectomy L1 and L2 for extradural lesion.  (CPT 46688)  3. Instrumentation  L1-S1. (CPT 98146)  4. Allograft (CPT 98533)       ANESTHESIA: General    ESTIMATED BLOOD LOSS:  300 ml    INTRAOPERATIVE COMPLICATIONS: None.    POSTOP CONDITION: Stable.    IMPLANTS:   Implant Name Type Inv. Item Serial No.  Lot No. LRB No. Used Action   ALLOGRAFT BNE CHIP 1-4 MM 5 CC CRUSH CAN - [id number]  ALLOGRAFT BNE CHIP 1-4 MM 5 CC CRUSH CAN [id number] [employer] [facility]  N/A 1 Implanted   PUTTY BIO DBM 10 ML W CHIPS - [id number]  PUTTY BIO DBM 10 ML W CHIPS  [employer] [facility] [id number] N/A 1 Implanted   GRAFT BNE MED - [id number]  GRAFT BNE MED [id number] [employer]  N/A 1 Implanted   BLOCKER SPNL L50MM DIA6MM TI 1 LEV JEREMIE 3 - [id number]  BLOCKER SPNL L50MM DIA6MM TI 1 LEV JEREMIE 3  [employer] [facility] [id number] N/A 11 Implanted   SCREW SPNL L45MM DIA5.5MM POLY[employer] - [id number]  SCREW SPNL L45MM DIA5.5MM POLY[employer] CLEMENTS  [employer] [facility]- [id number] N/A 2 Implanted   PAULO SPNL L480MM DIA6MM ANT POST LUM TI ALLY STR W/ HEX JEREMIE - [id number]  PAULO SPNL L480MM DIA6MM ANT POST LUM TI ALLY STR W/ HEX JEREMIE  [employer] [facility]- [id number] N/A 1 Implanted       INDICATIONS FOR PROCEDURE: Patient has had low back pain with radiation to the buttocks and lower extremities for an extended period of time. The symptoms and exam findings were felt to be consistent with neurogenic

## 2025-01-21 NOTE — PERIOP NOTE
Lidocaine drip pump settings confirmed at Ideal body weight: 52.4 kg (115 lb 8.3 oz) .  Infusing at 13.1 ml/hour.

## 2025-01-21 NOTE — H&P
Chief Complaint:  Radiating back and leg pain     History of Present Illness:       This patient is known to me from about 10 years ago when I had performed an L2-S1 laminectomy and fusion with Dr. Darnell and also did a cervical spine surgery on her.  Now, she presents with recurrent symptoms of neurogenic claudication with limited tolerance for walking and standing and inability to stand up straight without significant levels of pain.  She has tried physical therapy and chiropractic care.  She has now developed a significant limp with her gait.  She has been worked up by the hip providers at [facility] after failing to diagnose anything intra-articular.  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         Medications:       Current Medication      Current Outpatient Medications:     clobetasol (TEMOVATE) 0.05 % ointment, APPLY A PEASIZE AMOUNT TOPICALLY TO THE OUTSIDE OF YOUR PERINEUM TWO TIMES A DAY AS NEEDED FOR ITCHING, Disp: , Rfl:     fluconazole (DIFLUCAN) 150 MG tablet, Take 1 tablet by mouth daily, Disp: , Rfl:     rosuvastatin (CRESTOR) 5 MG tablet, , Disp: , Rfl:     meloxicam (MOBIC) 15 MG tablet, Take 1 tablet by mouth daily for 28 days, Disp: 28 tablet, Rfl: 0    diclofenac sodium (VOLTAREN) 1 % GEL, Apply 2 g topically 3 times daily for 28 days, Disp: 168 g, Rfl: 0    alendronate (FOSAMAX) 70 MG tablet, Take 1 tablet by mouth every 7 days, Disp: , Rfl:     meloxicam (MOBIC) 7.5 MG tablet, Take 1 tablet by mouth daily as needed, Disp: , Rfl:     oxybutynin (DITROPAN-XL) 5 MG extended release tablet, Take 1 tablet by mouth daily, Disp: , Rfl:     pantoprazole (PROTONIX) 40 MG tablet, Take 1 tablet by mouth daily, Disp: , Rfl:     PARoxetine (PAXIL) 10 MG tablet, Take 1 tablet by mouth, Disp: , Rfl:         Allergies:  Allergies   No Known Allergies           Physical Exam:      This is a well developed well nourished female adult.      Mood and

## 2025-01-21 NOTE — PERIOP NOTE
TRANSFER - OUT REPORT:    Verbal report given to SAMMI Steinberg on Angle Morgan  being transferred to [facility] for routine progression of patient care       Report consisted of patient’s Situation, Background, Assessment and   Recommendations(SBAR).     Information from the following report(s) Nurse Handoff Report, Adult Overview, Surgery Report, and Neuro Assessment was reviewed with the receiving nurse.    Lines:   Peripheral IV 01/21/25 Distal;Right;Dorsal Forearm (Active)        Opportunity for questions and clarification was provided.      Patient to be transported with:   O2 @ 2 liters  Registered Nurse

## 2025-01-21 NOTE — DISCHARGE INSTRUCTIONS
Discharge Instructions - Spine Surgery    Wound Care and Showering  For anterior neck surgery your wound is typically only covered with glue and does not need dressings and can get wet in the shower.  For all other spine surgeries, your wound will typically be covered with layers of Tegaderm, gauze, clear mesh and glue, which is waterproof sufficient for showering but not soaking in a bath. If the outer gauze is not saturated, the dressing does not need changed.  If there is drainage or bleeding which completely saturates the outer gauze, the outer layers should be redressed with fresh gauze and secured with a new Tegaderm (purchased at a pharmacy) until the drainage stops. Tegaderm is preferable since it is waterproof and can be worn in the shower.  After 3-4 days at home, if there is no further drainage, the outer gauze and Tegaderm dressing can be removed and left off, while leaving the glue/mesh layer in place but open to air.  No tub baths, hot tubs or whirlpools until seen in the office. Once the wound is healed (about 2 weeks), the final layer of glue/mesh can be removed by dissolving with a triple-antibiotic or we can remove it at your first post operative visit.        If any of the following should occur, please call the office:  Fevers greater than 101 degrees that does not improve after tylenol or ibuprofen.  Increased redness or large amount of swelling around incision    Exercise  Walking and stair climbing privileges are unlimited to your comfort and capability.  Do NOT lift greater than 10 lbs (about a gallon of milk) until otherwise instructed. Minimize bending and twisting at the waist to what is necessary to dress, bathe, perform personal hygiene, and get in and out of a vehicle for appointments.  Do not attempt exercises to stretch or strengthen your neck or back until released by your provider when more healing has occurred.     Sleeping  You may sleep in any comfortable position. Many

## 2025-01-22 PROCEDURE — 6370000000 HC RX 637 (ALT 250 FOR IP): Performed by: ORTHOPAEDIC SURGERY

## 2025-01-22 PROCEDURE — 97165 OT EVAL LOW COMPLEX 30 MIN: CPT

## 2025-01-22 PROCEDURE — 97161 PT EVAL LOW COMPLEX 20 MIN: CPT

## 2025-01-22 PROCEDURE — 6360000002 HC RX W HCPCS: Performed by: ORTHOPAEDIC SURGERY

## 2025-01-22 PROCEDURE — 97535 SELF CARE MNGMENT TRAINING: CPT

## 2025-01-22 PROCEDURE — 97530 THERAPEUTIC ACTIVITIES: CPT

## 2025-01-22 PROCEDURE — 2500000003 HC RX 250 WO HCPCS: Performed by: ORTHOPAEDIC SURGERY

## 2025-01-22 RX ADMIN — ACETAMINOPHEN 650 MG: 325 TABLET ORAL at 05:22

## 2025-01-22 RX ADMIN — SODIUM CHLORIDE, PRESERVATIVE FREE 10 ML: 5 INJECTION INTRAVENOUS at 22:06

## 2025-01-22 RX ADMIN — ACETAMINOPHEN 650 MG: 325 TABLET ORAL at 22:03

## 2025-01-22 RX ADMIN — PAROXETINE HYDROCHLORIDE 10 MG: 20 TABLET, FILM COATED ORAL at 08:02

## 2025-01-22 RX ADMIN — KETOROLAC TROMETHAMINE 15 MG: 15 INJECTION, SOLUTION INTRAMUSCULAR; INTRAVENOUS at 05:22

## 2025-01-22 RX ADMIN — ROSUVASTATIN CALCIUM 5 MG: 5 TABLET, FILM COATED ORAL at 22:03

## 2025-01-22 RX ADMIN — SODIUM CHLORIDE, PRESERVATIVE FREE 5 ML: 5 INJECTION INTRAVENOUS at 08:01

## 2025-01-22 RX ADMIN — CYCLOBENZAPRINE HYDROCHLORIDE 10 MG: 10 TABLET, FILM COATED ORAL at 22:03

## 2025-01-22 RX ADMIN — TROSPIUM CHLORIDE 20 MG: 20 TABLET, FILM COATED ORAL at 22:03

## 2025-01-22 RX ADMIN — WATER 2000 MG: 1 INJECTION INTRAMUSCULAR; INTRAVENOUS; SUBCUTANEOUS at 05:26

## 2025-01-22 RX ADMIN — ACETAMINOPHEN 650 MG: 325 TABLET ORAL at 15:02

## 2025-01-22 RX ADMIN — KETOROLAC TROMETHAMINE 15 MG: 15 INJECTION, SOLUTION INTRAMUSCULAR; INTRAVENOUS at 08:03

## 2025-01-22 RX ADMIN — POLYETHYLENE GLYCOL 3350 17 G: 17 POWDER, FOR SOLUTION ORAL at 08:02

## 2025-01-22 RX ADMIN — ACETAMINOPHEN 650 MG: 325 TABLET ORAL at 17:09

## 2025-01-22 RX ADMIN — PANTOPRAZOLE SODIUM 40 MG: 40 TABLET, DELAYED RELEASE ORAL at 08:03

## 2025-01-22 RX ADMIN — BISACODYL 5 MG: 5 TABLET, COATED ORAL at 08:03

## 2025-01-22 RX ADMIN — KETOROLAC TROMETHAMINE 15 MG: 15 INJECTION, SOLUTION INTRAMUSCULAR; INTRAVENOUS at 15:03

## 2025-01-22 ASSESSMENT — PAIN SCALES - GENERAL
PAINLEVEL_OUTOF10: 4
PAINLEVEL_OUTOF10: 1
PAINLEVEL_OUTOF10: 2
PAINLEVEL_OUTOF10: 0
PAINLEVEL_OUTOF10: 2
PAINLEVEL_OUTOF10: 6
PAINLEVEL_OUTOF10: 3

## 2025-01-22 ASSESSMENT — PAIN DESCRIPTION - LOCATION
LOCATION: BACK
LOCATION: BACK
LOCATION: BACK;LEG
LOCATION: BACK

## 2025-01-22 ASSESSMENT — PAIN DESCRIPTION - DESCRIPTORS
DESCRIPTORS: ACHING
DESCRIPTORS: ACHING;SPASM
DESCRIPTORS: ACHING

## 2025-01-22 ASSESSMENT — PAIN - FUNCTIONAL ASSESSMENT
PAIN_FUNCTIONAL_ASSESSMENT: ACTIVITIES ARE NOT PREVENTED
PAIN_FUNCTIONAL_ASSESSMENT: PREVENTS OR INTERFERES SOME ACTIVE ACTIVITIES AND ADLS

## 2025-01-22 ASSESSMENT — PAIN DESCRIPTION - ORIENTATION
ORIENTATION: LOWER
ORIENTATION: RIGHT;LEFT

## 2025-01-22 ASSESSMENT — PAIN DESCRIPTION - ONSET
ONSET: GRADUAL
ONSET: GRADUAL

## 2025-01-22 ASSESSMENT — PAIN DESCRIPTION - FREQUENCY
FREQUENCY: INTERMITTENT
FREQUENCY: INTERMITTENT

## 2025-01-22 ASSESSMENT — PAIN DESCRIPTION - PAIN TYPE
TYPE: SURGICAL PAIN
TYPE: SURGICAL PAIN

## 2025-01-22 NOTE — ANESTHESIA POSTPROCEDURE EVALUATION
Department of Anesthesiology  Postprocedure Note    Patient: Angle Morgan  MRN: [id number]  YOB: 1945  Date of evaluation: 1/21/2025    Procedure Summary       Date: 01/21/25 Room / Location: [facility] MAIN OR  / [facility] MAIN OR    Anesthesia Start: 1400 Anesthesia Stop: 1711    Procedure: ERAS\ L1-L2 laminectomy for extradural lesion and fusion with allograft, and L1-S1 instrumentation. (Spine Lumbar) Diagnosis:       Synovial cyst of lumbar spine      Spinal stenosis, lumbar region, with neurogenic claudication      History of lumbar spinal fusion      (Synovial cyst of lumbar spine [M71.38])      (Spinal stenosis, lumbar region, with neurogenic claudication [M48.062])      (History of lumbar spinal fusion [Z98.1])    Providers: Phoenix Ramires MD Responsible Provider: Klarissa Lacey MD    Anesthesia Type: General ASA Status: 2            Anesthesia Type: General    Yara Phase I: Yara Score: 9    Yara Phase II:      Anesthesia Post Evaluation    Patient location during evaluation: PACU  Patient participation: complete - patient participated  Level of consciousness: awake and alert  Airway patency: patent  Nausea & Vomiting: no nausea and no vomiting  Cardiovascular status: hemodynamically stable  Respiratory status: acceptable, nonlabored ventilation and spontaneous ventilation  Hydration status: euvolemic  Comments: BP (!) 112/58   Pulse 89   Temp 98.1 °F (36.7 °C)   Resp 17   Ht 1.6 m (5' 3\")   Wt 58.8 kg (129 lb 9.6 oz)   SpO2 99%   BMI 22.96 kg/m²     Multimodal analgesia pain management approach  Pain management: adequate and satisfactory to patient    No notable events documented.

## 2025-01-22 NOTE — PLAN OF CARE
Problem: Pain  Goal: Verbalizes/displays adequate comfort level or baseline comfort level  Outcome: Progressing     Problem: Skin/Tissue Integrity  Goal: Absence of new skin breakdown  Description: 1.  Monitor for areas of redness and/or skin breakdown  2.  Assess vascular access sites hourly  3.  Every 4-6 hours minimum:  Change oxygen saturation probe site  4.  Every 4-6 hours:  If on nasal continuous positive airway pressure, respiratory therapy assess nares and determine need for appliance change or resting period.  Outcome: Progressing     Problem: Discharge Planning  Goal: Discharge to home or other facility with appropriate resources  Outcome: Progressing     Problem: Safety - Adult  Goal: Free from fall injury  Outcome: Progressing

## 2025-01-22 NOTE — DISCHARGE SUMMARY
All soft tissue was elevated off of the intertransverse membrane laterally in preparation for a fusion bed. With the posterior lateral dissection completed, attention was directed centrally where a Leksell rongeur was used to resect the spinous processes of L1 through L2. The 4 mm krissy was then used to thin the lamina to an egg shell like thickness.  A triple-0 angled curet was used to elevate the scar off of its origin on the caudal surface of the lamina. The scar and ligamentum flavum was elevated from the thecal sac and a plane was created in the epidural space with a Rush elevator. A 4 mm Kerrison was used to perform a central laminectomy of L1 and L2. The central laminectomy was widened to the medial border of the pedicle at each level. With the central laminectomy completed, a 4 mm Kerrison was used to undercut the lateral recess along the entire length of the laminectomy site. A large extradural cyst was noted and carefully elevated off the thecal sac and excised providing satisfactory decompression of the spinal canal.      The 4 mm krissy was used to decorticate the previously exposed transverse processes and lateral aspect of the facet joints and pars intra-articularis.   The [facility] [facility] [employer] [facility] spinal instrumentation system was brought to the field and pedicle screws were placed bilaterally in L1. The medial border of the pedicle was visualized through the spinal canal to confirm no medial or inferior breech. This was felt to be satisfactory. At this point the Protios soaked allograft was then packed into the lateral gutters beneath the screw heads, along the decorticated transverse processes and lateral facet joints for the posterolateral arthrodesis at L1-L2. Appropriately sized rods were then selected and bent into additional lordosis and laid into the pedicle screw heads from L1 - S1 . The set screws were then applied and tightened to the appropriate torque. C-arm fluoroscopy was brought in and used to

## 2025-01-23 VITALS
TEMPERATURE: 97.5 F | DIASTOLIC BLOOD PRESSURE: 80 MMHG | RESPIRATION RATE: 18 BRPM | OXYGEN SATURATION: 100 % | BODY MASS INDEX: 22.96 KG/M2 | SYSTOLIC BLOOD PRESSURE: 136 MMHG | WEIGHT: 129.6 LBS | HEIGHT: 63 IN | HEART RATE: 98 BPM

## 2025-01-23 PROCEDURE — 6370000000 HC RX 637 (ALT 250 FOR IP): Performed by: ORTHOPAEDIC SURGERY

## 2025-01-23 PROCEDURE — 97530 THERAPEUTIC ACTIVITIES: CPT

## 2025-01-23 PROCEDURE — 2500000003 HC RX 250 WO HCPCS: Performed by: ORTHOPAEDIC SURGERY

## 2025-01-23 RX ADMIN — PAROXETINE HYDROCHLORIDE 10 MG: 20 TABLET, FILM COATED ORAL at 08:23

## 2025-01-23 RX ADMIN — ACETAMINOPHEN 650 MG: 325 TABLET ORAL at 05:29

## 2025-01-23 RX ADMIN — PANTOPRAZOLE SODIUM 40 MG: 40 TABLET, DELAYED RELEASE ORAL at 08:23

## 2025-01-23 RX ADMIN — BISACODYL 5 MG: 5 TABLET, COATED ORAL at 08:23

## 2025-01-23 RX ADMIN — SODIUM CHLORIDE, PRESERVATIVE FREE 10 ML: 5 INJECTION INTRAVENOUS at 08:24

## 2025-01-23 RX ADMIN — ACETAMINOPHEN 650 MG: 325 TABLET ORAL at 13:31

## 2025-01-23 RX ADMIN — POLYETHYLENE GLYCOL 3350 17 G: 17 POWDER, FOR SOLUTION ORAL at 08:23

## 2025-01-23 ASSESSMENT — PAIN SCALES - GENERAL: PAINLEVEL_OUTOF10: 0

## 2025-01-23 NOTE — PROGRESS NOTES
ACUTE OCCUPATIONAL THERAPY GOALS:   (Developed with and agreed upon by patient and/or caregiver.)  1. Patient will perform lower body dressing with mod I.  2. Patient will perform upper and lower body bathing with mod I.  3. Patient will perform toilet transfers with mod I.  4. Patient will participate in 30 + minutes of ADL/ therapeutic exercise/therapeutic activity with min rest breaks to increase activity tolerance for self care.  5. Patient will perform standing grooming tasks x5 mins with mod I.  6. Patient will perform ADL functional mobility in room with mod I.    Goals to be achieved in 7 days.     OCCUPATIONAL THERAPY Initial Assessment and Daily Note       OT Visit Days: 1  Acknowledge Orders  Time  OT Charge Capture  Rehab Caseload Tracker      Angle Morgan is a 79 y.o. female   PRIMARY DIAGNOSIS: Synovial cyst of lumbar spine  Synovial cyst of lumbar spine [M71.38]  Spinal stenosis, lumbar region, with neurogenic claudication [M48.062]  History of lumbar spinal fusion [Z98.1]  S/P lumbar fusion [Z98.1]  Procedure(s) (LRB):  ERAS\ L1-L2 laminectomy for extradural lesion and fusion with allograft, and L1-S1 instrumentation. (N/A)  1 Day Post-Op  Reason for Referral: Generalized Muscle Weakness (M62.81)  Other lack of cordination (R27.8)  History of falling (Z91.81)  Outpatient in a bed: Payor: [employer] MEDICARE / Plan: [employer] MEDICARE-ADVANTAGE PPO / Product Type: Medicare /     ASSESSMENT:     REHAB RECOMMENDATIONS:   Recommendation to date pending progress:  Setting:  No further skilled occupational therapy after discharge from hospital    Equipment:    None     ASSESSMENT:  Angle Morgan is a 79 y.o. s/p L1-L2 laminectomy for extradural lesion and fusion with allograft, and L1-S1 instrumentation. Patient had a recent R knee fracture- has been wearing a knee brace and using a cane since. Patient lives with her  and reports prior level of function as independent with all ADLs, 
ACUTE PHYSICAL THERAPY GOALS:   (Developed with and agreed upon by patient and/or caregiver.)    (1.) Angle Morgan  will move from supine to sit and sit to supine  with INDEPENDENCE within 7 treatment day(s).    (2.) Angle Morgan will transfer from bed to chair and chair to bed with MODIFIED INDEPENDENCE using the least restrictive device within 7 treatment day(s).    (3.) Angle Morgan will ambulate with MODIFIED INDEPENDENCE for 500 feet with the least restrictive device within 7 treatment day(s).   (4.) Angle Morgan will perform standing static and dynamic balance activities x 20 minutes with SUPERVISION to improve safety within 7 treatment day(s).  (5.) Angle Morgan will ascend and descend 2 stairs using 1 hand rail(s) with SUPERVISION to improve functional mobility and safety within 7 treatment day(s).  (6.) Angle Morgan will perform therapeutic exercises x 20 min for HEP with INDEPENDENCE to improve strength, endurance, and functional mobility within 7 treatment day(s).       PHYSICAL THERAPY Initial Assessment, Daily Note, and AM  (Link to Caseload Tracking: PT Visit Days : 1  Acknowledge Orders  Time In/Out  PT Charge Capture  Rehab Caseload Tracker    Angle Morgan is a 79 y.o. female   PRIMARY DIAGNOSIS: Synovial cyst of lumbar spine  Synovial cyst of lumbar spine [M71.38]  Spinal stenosis, lumbar region, with neurogenic claudication [M48.062]  History of lumbar spinal fusion [Z98.1]  S/P lumbar fusion [Z98.1]  Procedure(s) (LRB):  ERAS\ L1-L2 laminectomy for extradural lesion and fusion with allograft, and L1-S1 instrumentation. (N/A)  1 Day Post-Op  Reason for Referral: Generalized Muscle Weakness (M62.81)  Difficulty in walking, Not elsewhere classified (R26.2)  Low Back Pain (M54.5)  Outpatient in a bed: Payor: [employer] / Plan: [employer] MEDIC[employer]ADVANTAGE PPO / Product Type: Medicare /     ASSESSMENT:     REHAB RECOMMENDATIONS: 
ACUTE PHYSICAL THERAPY GOALS:   (Developed with and agreed upon by patient and/or caregiver.)    1.) Angle Morgan  will move from supine to sit and sit to supine  with INDEPENDENCE within 7 treatment day(s).    (2.) Angle Morgan will transfer from bed to chair and chair to bed with MODIFIED INDEPENDENCE using the least restrictive device within 7 treatment day(s).    (3.) Angle Morgan will ambulate with MODIFIED INDEPENDENCE for 500 feet with the least restrictive device within 7 treatment day(s).   (4.) Angle Morgan will perform standing static and dynamic balance activities x 20 minutes with SUPERVISION to improve safety within 7 treatment day(s).  (5.) Angle Morgan will ascend and descend 2 stairs using 1 hand rail(s) with SUPERVISION to improve functional mobility and safety within 7 treatment day(s).  (6.) Angle Morgan will perform therapeutic exercises x 20 min for HEP with INDEPENDENCE to improve strength, endurance, and functional mobility within 7 treatment day(s).        PHYSICAL THERAPY: Daily Note AM   (Link to Caseload Tracking: PT Visit Days : 2  Time In/Out PT Charge Capture  Rehab Caseload Tracker  Orders    Angle Morgan is a 79 y.o. female   PRIMARY DIAGNOSIS: Synovial cyst of lumbar spine  Synovial cyst of lumbar spine [M71.38]  Spinal stenosis, lumbar region, with neurogenic claudication [M48.062]  History of lumbar spinal fusion [Z98.1]  S/P lumbar fusion [Z98.1]  Procedure(s) (LRB):  ERAS\ L1-L2 laminectomy for extradural lesion and fusion with allograft, and L1-S1 instrumentation. (N/A)  2 Days Post-Op  Outpatient in a bed: Payor: [facility] MEDICARE / Plan: AET MEDICARE-ADVANTAGE PPO / Product Type: Medicare /     ASSESSMENT:     REHAB RECOMMENDATIONS:   Recommendation to date pending progress:  Setting:  Home Health Therapy    Equipment:    None     ASSESSMENT:  Ms. Morgan is making steady progress toward goals with 
ORTHO PROGRESS NOTE    2025    Admit Date: 2025  Post Op day: 1 Day Post-Op      Subjective:     Angle Morgan is a patient who is now 1 Day Post-Op  and has no complaints.       Objective:     PT/OT:    Progressing    Vital Signs:    Patient Vitals for the past 8 hrs:   BP Temp Temp src Pulse Resp SpO2   25 0737 (!) 101/59 98.2 °F (36.8 °C) Oral 97 16 97 %   25 0437 (!) 100/50 97.5 °F (36.4 °C) Oral 90 16 100 %     Temp (24hrs), Av.9 °F (36.6 °C), Min:97.5 °F (36.4 °C), Max:98.3 °F (36.8 °C)      LAB:    No results for input(s): \"HGB\", \"WBC\", \"PLT\" in the last 72 hours.    Physical Exam:    Awake and in no acute distress.  Mood and affect appropriate.  Respirations unlabored and no evidence cyanosis.  Calves nontender.  Abdomen soft and nontender.  Dressing clean/dry  No new neurologic deficit.    Assessment:      1 Day Post-Op STATUS POST Procedure(s):  ERAS\ L1-L2 laminectomy for extradural lesion and fusion with allograft, and L1-S1 instrumentation.      Plan:     Continue drain until tomorrow.  Anticipate discharge to: HOME in AM      Signed By: YOUSUF HUDSON MD        
ORTHO PROGRESS NOTE    2025    Admit Date: 2025  Post Op day: 2 Days Post-Op      Subjective:     Angle Morgan is a patient who is now 2 Days Post-Op  and has no complaints.       Objective:     PT/OT:    Progressing    Vital Signs:    Patient Vitals for the past 8 hrs:   BP Temp Temp src Pulse Resp SpO2   25 0744 136/80 97.5 °F (36.4 °C) Oral 98 18 100 %     Temp (24hrs), Av.8 °F (36.6 °C), Min:97.5 °F (36.4 °C), Max:98.2 °F (36.8 °C)      LAB:    No results for input(s): \"HGB\", \"WBC\", \"PLT\" in the last 72 hours.    Physical Exam:    Awake and in no acute distress.  Mood and affect appropriate.  Respirations unlabored and no evidence cyanosis.  Calves nontender.  Abdomen soft and nontender.  Dressing clean/dry  No new neurologic deficit.    Assessment:      2 Days Post-Op STATUS POST Procedure(s):  ERAS\ L1-L2 laminectomy for extradural lesion and fusion with allograft, and L1-S1 instrumentation.      Plan:     Anticipate discharge to: HOME       Signed By: Lincoln Vasquez MD        
increased gait distances, but is concerned about burning and discomfort in her legs.  She was agreeable to mobility and reported that standing and ambulation helped to alleviate her discomfort.  The patient demonstrated good log roll technique and good sitting balance at the edge of the bed.  She stood and ambulated with SBA/supervision and the use of the RW.  Cueing provided for walker management and pacing, but overall the patient demonstrated good technique.  Additional time spent answering questions and reviewing patient needs for home.      PM:  The patient is supine upon contact and reports feeling a lot better.  She performed bed mobility with good log roll technique and maintained gait distances with the use of the RW.  She continues to have antalgic gait from her prior knee injury, but has no loss of balance.  Good progress toward goals.       SUBJECTIVE:   Ms. Morgan states, \"Thank you, I feel better\"     Social/Functional Lives With: Spouse  Type of Home: House  Home Layout: One level  Home Access: Stairs to enter with rails  Entrance Stairs - Number of Steps: 2  Bathroom Shower/Tub: Walk-in shower  Bathroom Equipment: Built-in shower seat  Prior Level of Assist for ADLs: Independent  Prior Level of Assist for Ambulation: Independent community ambulator, with or without device  Prior Level of Assist for Transfers: Independent  OBJECTIVE:     PAIN: VITALS / O2: PRECAUTION / LINES / DRAINS:   Pre Treatment:    3      Post Treatment: 2 Vitals        Oxygen    Hemovac    RESTRICTIONS/PRECAUTIONS:  Position Activity Restriction  Spinal Precautions: No Bending, No Lifting, No Twisting     MOBILITY: I Mod I S SBA CGA Min Mod Max Total  NT x2 Comments:   Bed Mobility    Rolling [] [] [x] [] [] [] [] [] [] [] []    Supine to Sit [] [] [x] [] [] [] [] [] [] [] []    Scooting [] [] [x] [] [] [] [] [] [] [] []    Sit to Supine [] [] [x] [] [] [] [] [] [] [] []    Transfers    Sit to Stand [] [] [x] [] [] [] [] [] [] 
[x] [x] [] [] []      GAIT: I Mod I S SBA CGA Min Mod Max Total  NT x2 Comments:   Level of Assistance [] [] [] [x] [x] [] [] [] [] [] []    Distance 500 feet    DME Rolling Walker    Gait Quality Decreased armando , Decreased step length, and Trunk flexion    Weightbearing Status      Stairs      I=Independent, Mod I=Modified Independent, S=Supervision, SBA=Standby Assistance, CGA=Contact Guard Assistance,   Min=Minimal Assistance, Mod=Moderate Assistance, Max=Maximal Assistance, Total=Total Assistance, NT=Not Tested    PLAN:   FREQUENCY AND DURATION: BID for duration of hospital stay or until stated goals are met, whichever comes first.    TREATMENT:   TREATMENT:   Therapeutic Activity (23 Minutes): Therapeutic activity included Scooting, Transfer Training, Ambulation on level ground, Sitting balance , and Standing balance to improve functional Activity tolerance, Balance, Mobility, and Strength.    TREATMENT GRID:  N/A    AFTER TREATMENT PRECAUTIONS: Bed/Chair Locked, Call light within reach, Chair, Needs within reach, and RN notified    INTERDISCIPLINARY COLLABORATION:  RN/ PCT    EDUCATION: Education Given To: Patient  Education Provided: Role of Therapy;Plan of Care;Precautions;Fall Prevention Strategies;Equipment  Education Method: Verbal  Barriers to Learning: None  Education Outcome: Verbalized understanding    TIME IN/OUT:  Time In: 1407  Time Out: 1430  Minutes: 23    HOLLY VARGAS PT

## 2025-01-23 NOTE — PLAN OF CARE
Problem: Pain  Goal: Verbalizes/displays adequate comfort level or baseline comfort level  1/22/2025 2025 by Gali Workman RN  Outcome: Progressing  1/22/2025 0940 by Paz Weber RN  Outcome: Progressing     Problem: Skin/Tissue Integrity  Goal: Absence of new skin breakdown  Description: 1.  Monitor for areas of redness and/or skin breakdown  2.  Assess vascular access sites hourly  3.  Every 4-6 hours minimum:  Change oxygen saturation probe site  4.  Every 4-6 hours:  If on nasal continuous positive airway pressure, respiratory therapy assess nares and determine need for appliance change or resting period.  1/22/2025 2025 by Gali Workman RN  Outcome: Progressing  1/22/2025 0940 by Paz Weber RN  Outcome: Progressing     Problem: Discharge Planning  Goal: Discharge to home or other facility with appropriate resources  1/22/2025 2025 by Gali Workman RN  Outcome: Progressing  1/22/2025 0940 by Paz Weber RN  Outcome: Progressing     Problem: Safety - Adult  Goal: Free from fall injury  1/22/2025 2025 by Gali Workman RN  Outcome: Progressing  1/22/2025 0940 by Paz Weber RN  Outcome: Progressing

## 2025-01-30 ENCOUNTER — TELEPHONE (OUTPATIENT)
Dept: ORTHOPEDIC SURGERY | Age: 80
End: 2025-01-30

## 2025-01-30 NOTE — TELEPHONE ENCOUNTER
Discussed with her that some swelling in he legs and feet right after surgery isn't abnormal. That she should wear her compression stockings and elevate her legs/feet when immobile. She was also concerned about some burning in her hips. I did tell her that it was normal to feel some odd pains after her her surgery. That was the nerves reacting and that all of this would settle down. She was instructed to call back with any additional questions or concerns

## 2025-02-06 ENCOUNTER — OFFICE VISIT (OUTPATIENT)
Age: 80
End: 2025-02-06

## 2025-02-06 DIAGNOSIS — Z98.1 STATUS POST LUMBAR SPINAL ARTHRODESIS: Primary | ICD-10-CM

## 2025-02-06 PROCEDURE — 99024 POSTOP FOLLOW-UP VISIT: CPT | Performed by: ORTHOPAEDIC SURGERY

## 2025-02-06 NOTE — PROGRESS NOTES
Name: Angle Morgan  YOB: 1945  Gender: female  MRN: [id number]  Age: 80 y.o.    Chief Complaint: Lumbar spine surgery follow up    History of Present Illness:      Angle Morgan  is here for 2 week follow up of her  lumbar posteolateral fusion surgery.  She reports significant relief of preoperative lower extremity pain, weakness and parasthesias. There is the expected residual back stiffness.  She has been ambulating without an assistive device up to 2 miles per day         Medications:       Current Outpatient Medications:     mirabegron (MYRBETRIQ) 25 MG TB24, Take 1 tablet by mouth daily, Disp: , Rfl:     rosuvastatin (CRESTOR) 5 MG tablet, , Disp: , Rfl:     meloxicam (MOBIC) 15 MG tablet, Take 1 tablet by mouth daily for 28 days, Disp: 28 tablet, Rfl: 0    diclofenac sodium (VOLTAREN) 1 % GEL, Apply 2 g topically 3 times daily for 28 days, Disp: 168 g, Rfl: 0    alendronate (FOSAMAX) 70 MG tablet, Take 1 tablet by mouth every 7 days, Disp: , Rfl:     pantoprazole (PROTONIX) 40 MG tablet, Take 1 tablet by mouth daily, Disp: , Rfl:     PARoxetine (PAXIL) 10 MG tablet, Take 1 tablet by mouth, Disp: , Rfl:     Allergies:  No Known Allergies      Physical Exam:      Respirations are unlabored and there is no evidence of cyanosis      Wound is healing nicely without erythema, drainage or underlying fluctuance    Gait is improved    Sensory testing reveals intact sensation to light touch and in the distribution of the L3-S1 dermatomes bilaterally.    Strength testing in the lower extremity reveals the following based on the 5 point grading scale:       HF (L2) H Ab (L5) KE (L3/4) ADF (L4) EHL (L5) A Ev (S1) APF (S1)   Right 5 5 5 5 5 5 5   Left 5 5 5 5 5 5 5        Radiographic Studies:     X-rays including AP and lateral views of the lumbar spine were reviewed and interpreted:     Postoperative changes are noted status post lumbar fusion with instrumentation.  The hardware

## 2025-02-10 ENCOUNTER — OFFICE VISIT (OUTPATIENT)
Dept: ORTHOPEDIC SURGERY | Age: 80
End: 2025-02-10

## 2025-02-10 DIAGNOSIS — S82.141A TIBIAL PLATEAU FRACTURE, RIGHT, CLOSED, INITIAL ENCOUNTER: ICD-10-CM

## 2025-02-10 DIAGNOSIS — M17.11 PRIMARY OSTEOARTHRITIS OF RIGHT KNEE: ICD-10-CM

## 2025-02-10 DIAGNOSIS — M25.561 RIGHT KNEE PAIN, UNSPECIFIED CHRONICITY: Primary | ICD-10-CM

## 2025-02-10 DIAGNOSIS — M70.61 GREATER TROCHANTERIC BURSITIS OF RIGHT HIP: ICD-10-CM

## 2025-02-10 DIAGNOSIS — M25.551 HIP PAIN, RIGHT: ICD-10-CM

## 2025-02-10 RX ORDER — METHYLPREDNISOLONE ACETATE 40 MG/ML
40 INJECTION, SUSPENSION INTRA-ARTICULAR; INTRALESIONAL; INTRAMUSCULAR; SOFT TISSUE ONCE
Status: COMPLETED | OUTPATIENT
Start: 2025-02-10 | End: 2025-02-10

## 2025-02-10 RX ADMIN — METHYLPREDNISOLONE ACETATE 40 MG: 40 INJECTION, SUSPENSION INTRA-ARTICULAR; INTRALESIONAL; INTRAMUSCULAR; SOFT TISSUE at 10:51

## 2025-02-10 NOTE — PROGRESS NOTES
Name: Angle Morgan  YOB: 1945  Gender: female  MRN: [id number]      Chief complaint:  RIGHT KNEE PAIN    History of Present Illness:     Angle Morgan is a 80 y.o. female  She returns today for recheck guarding her right knee and will repeat x-rays after a tibial plateau fracture was identified on her x-rays previously.  Today she is ambulating with use of a cane and states that overall she is feeling much better in terms of her right knee.  She is still using the brace, but is anxious to wean out of it and has already started to do so some on her own.  She also did recently have a multilevel lumbar spinal fusion, which again she notes she is doing well with.  Today her main complaint is that she does have some snapping and pain over the lateral aspect of the right hip.  Denies any groin or thigh pain today.    Past Medical History:    Allergies:  No Known Allergies    Medications:  Current Outpatient Medications   Medication Sig    mirabegron (MYRBETRIQ) 25 MG TB24 Take 1 tablet by mouth daily    rosuvastatin (CRESTOR) 5 MG tablet     meloxicam (MOBIC) 15 MG tablet Take 1 tablet by mouth daily for 28 days    diclofenac sodium (VOLTAREN) 1 % GEL Apply 2 g topically 3 times daily for 28 days    alendronate (FOSAMAX) 70 MG tablet Take 1 tablet by mouth every 7 days    pantoprazole (PROTONIX) 40 MG tablet Take 1 tablet by mouth daily    PARoxetine (PAXIL) 10 MG tablet Take 1 tablet by mouth     Current Facility-Administered Medications   Medication Dose Route Frequency    methylPREDNISolone acetate (DEPO-MEDROL) injection 40 mg  40 mg Intra-artICUlar Once       Past Medical history:  Past Medical History:   Diagnosis Date    Anxiety     managed with med    Arthritis     osteo    Chronic pain     back and feet pain    COVID-19 vaccine series completed 02/2021    Ian    Former smoker quit 1985    15 yrs 1 1/2 pk per day    GERD (gastroesophageal reflux disease)     managed with med

## 2025-04-10 ENCOUNTER — OFFICE VISIT (OUTPATIENT)
Age: 80
End: 2025-04-10

## 2025-04-10 DIAGNOSIS — Z98.1 STATUS POST LUMBAR SPINAL ARTHRODESIS: Primary | ICD-10-CM

## 2025-04-10 PROCEDURE — 99024 POSTOP FOLLOW-UP VISIT: CPT | Performed by: ORTHOPAEDIC SURGERY

## 2025-04-10 NOTE — PROGRESS NOTES
Name: Angle Morgan  YOB: 1945  Gender: female  MRN: [id number]  Age: 80 y.o.    Chief Complaint: Lumbar spine surgery follow up    History of Present Illness:      Angle Morgan  is here for 11 week follow up of her  lumbar posteolateral fusion surgery.  She reports significant relief of preoperative lower extremity pain, weakness and parasthesias. There is the expected residual back stiffness.           Medications:       Current Outpatient Medications:     mirabegron (MYRBETRIQ) 25 MG TB24, Take 1 tablet by mouth daily, Disp: , Rfl:     rosuvastatin (CRESTOR) 5 MG tablet, , Disp: , Rfl:     meloxicam (MOBIC) 15 MG tablet, Take 1 tablet by mouth daily for 28 days, Disp: 28 tablet, Rfl: 0    diclofenac sodium (VOLTAREN) 1 % GEL, Apply 2 g topically 3 times daily for 28 days, Disp: 168 g, Rfl: 0    alendronate (FOSAMAX) 70 MG tablet, Take 1 tablet by mouth every 7 days, Disp: , Rfl:     pantoprazole (PROTONIX) 40 MG tablet, Take 1 tablet by mouth daily, Disp: , Rfl:     PARoxetine (PAXIL) 10 MG tablet, Take 1 tablet by mouth, Disp: , Rfl:     Allergies:  No Known Allergies      Physical Exam:      Respirations are unlabored and there is no evidence of cyanosis      Wound is healed nicely without erythema, drainage or underlying fluctuance    Gait is improved    Sensory testing reveals intact sensation to light touch and in the distribution of the L3-S1 dermatomes bilaterally.    Strength testing in the lower extremity reveals the following based on the 5 point grading scale:       HF (L2) H Ab (L5) KE (L3/4) ADF (L4) EHL (L5) A Ev (S1) APF (S1)   Right 5 5 5 5 5 5 5   Left 5 5 5 5 5 5 5        Radiographic Studies:     X-rays including AP and lateral views of the lumbar spine were reviewed and interpreted:     Postoperative changes are noted status post lumbar fusion with instrumentation.  The hardware appears to be well-placed and intact.  There is no evidence of significant

## 2025-06-09 ENCOUNTER — OFFICE VISIT (OUTPATIENT)
Dept: ORTHOPEDIC SURGERY | Age: 80
End: 2025-06-09
Payer: MEDICARE

## 2025-06-09 DIAGNOSIS — M70.61 GREATER TROCHANTERIC BURSITIS OF RIGHT HIP: ICD-10-CM

## 2025-06-09 DIAGNOSIS — M17.11 PRIMARY OSTEOARTHRITIS OF RIGHT KNEE: Primary | ICD-10-CM

## 2025-06-09 PROCEDURE — 1123F ACP DISCUSS/DSCN MKR DOCD: CPT | Performed by: PHYSICIAN ASSISTANT

## 2025-06-09 PROCEDURE — 20610 DRAIN/INJ JOINT/BURSA W/O US: CPT | Performed by: PHYSICIAN ASSISTANT

## 2025-06-09 PROCEDURE — 99214 OFFICE O/P EST MOD 30 MIN: CPT | Performed by: PHYSICIAN ASSISTANT

## 2025-06-09 RX ORDER — METHYLPREDNISOLONE ACETATE 40 MG/ML
40 INJECTION, SUSPENSION INTRA-ARTICULAR; INTRALESIONAL; INTRAMUSCULAR; SOFT TISSUE ONCE
Status: COMPLETED | OUTPATIENT
Start: 2025-06-09 | End: 2025-06-09

## 2025-06-09 RX ADMIN — METHYLPREDNISOLONE ACETATE 40 MG: 40 INJECTION, SUSPENSION INTRA-ARTICULAR; INTRALESIONAL; INTRAMUSCULAR; SOFT TISSUE at 09:34

## 2025-06-09 NOTE — PROGRESS NOTES
Name: Angle Morgan  YOB: 1945  Gender: female  MRN: [id number]    CHIEF COMPLAINT: RIGHT HIP PAIN    HPI:  The pain has been present for months.  There was not an acute injury to the hip.  The pain is located over the lateral aspect.  It does not hurt to walk too much of a degree.  The pain does radiate down the leg to the thigh on the lateral aspect.  Numbness and tingling are not noted below the knee.  It hurts to lie on the affected hip over the lateral aspect.  Treatment so far has been OTC anti-inflammatory medications/Tylenol.  She has had good relief with bursa injections in the past provided by us.  She also has a history of quite chronic and severe back issues, requiring multilevel lumbar spinal fusion.    Today also notes some mild pain of the right knee pain.  States her pain is about 2/3 out of 10.  Notes mild instability at times and this is typically improved with the use of a brace.    Past Medical History:    Allergies:  No Known Allergies    Medications:  Current Outpatient Medications   Medication Sig    mirabegron (MYRBETRIQ) 25 MG TB24 Take 1 tablet by mouth daily    rosuvastatin (CRESTOR) 5 MG tablet     meloxicam (MOBIC) 15 MG tablet Take 1 tablet by mouth daily for 28 days    diclofenac sodium (VOLTAREN) 1 % GEL Apply 2 g topically 3 times daily for 28 days    alendronate (FOSAMAX) 70 MG tablet Take 1 tablet by mouth every 7 days    pantoprazole (PROTONIX) 40 MG tablet Take 1 tablet by mouth daily    PARoxetine (PAXIL) 10 MG tablet Take 1 tablet by mouth     Current Facility-Administered Medications   Medication Dose Route Frequency    methylPREDNISolone acetate (DEPO-MEDROL) injection 40 mg  40 mg Intra-artICUlar Once       Past Medical history:  Past Medical History:   Diagnosis Date    Anxiety     managed with med    Arthritis     osteo    Chronic pain     back and feet pain    COVID-19 vaccine series completed 02/2021    Ian    Former smoker quit 1985    14

## 2025-06-30 ENCOUNTER — TELEPHONE (OUTPATIENT)
Dept: ORTHOPEDIC SURGERY | Age: 80
End: 2025-06-30

## 2025-06-30 NOTE — TELEPHONE ENCOUNTER
Left patient a VM letting her know her appointment scheduled for today has been moved to 7/1 @ 9:00am due to provider being out of the office.

## 2025-07-01 ENCOUNTER — OFFICE VISIT (OUTPATIENT)
Dept: ORTHOPEDIC SURGERY | Age: 80
End: 2025-07-01
Payer: MEDICARE

## 2025-07-01 DIAGNOSIS — M17.11 PRIMARY OSTEOARTHRITIS OF RIGHT KNEE: Primary | ICD-10-CM

## 2025-07-01 PROCEDURE — 20611 DRAIN/INJ JOINT/BURSA W/US: CPT | Performed by: PHYSICIAN ASSISTANT

## 2025-07-01 NOTE — PROGRESS NOTES
Name: Angle Morgan  YOB: 1945  Gender: female  MRN: [id number]     CC: RIGHT Knee Osteoarthritis      PROCEDURE: #1 of 3 Hyaluronic Injection    The patient's name and date of birth were confirmed prior to the injection.  The injection site was also confirmed with the patient prior to the procedure. After discussion of risks and benefits including but not limited to pain, infection, skin discoloration, and injury to blood vessels or nerves the patient verbally consented to proceed with injection of the RIGHT.  The patient is to restrict their activity for 48 hours.    Radiology Report: US guidance was used to examine the joint, ensure adequate needle placement and to decrease the risk of joint aggravation. The intracondylar notch, retropatellar fat pad, patella tendon, patella and tibia were all visualized. Pre and post injection US still images were obtained and placed in the record. Image were obtained with a [employer] ultrasound transducer (model [id number]).    Procedure Note: After steriley prepping the RIGHT knee, it was injected with a 2mL of Euflexxa and the medication was observed going into the intra-articular space via US guidance.    The patient tolerated the procedure without difficulty.    MISSY Anaya

## 2025-07-07 ENCOUNTER — OFFICE VISIT (OUTPATIENT)
Dept: ORTHOPEDIC SURGERY | Age: 80
End: 2025-07-07
Payer: MEDICARE

## 2025-07-07 DIAGNOSIS — M17.11 PRIMARY OSTEOARTHRITIS OF RIGHT KNEE: Primary | ICD-10-CM

## 2025-07-07 PROCEDURE — 20611 DRAIN/INJ JOINT/BURSA W/US: CPT | Performed by: PHYSICIAN ASSISTANT

## 2025-07-07 NOTE — PROGRESS NOTES
Name: Angle Morgan  YOB: 1945  Gender: female  MRN: [id number]     CC: RIGHT Knee Osteoarthritis      PROCEDURE: #2 of 3 Hyaluronic Injection    The patient's name and date of birth were confirmed prior to the injection.  The injection site was also confirmed with the patient prior to the procedure. After discussion of risks and benefits including but not limited to pain, infection, skin discoloration, and injury to blood vessels or nerves the patient verbally consented to proceed with injection of the RIGHT.  The patient is to restrict their activity for 48 hours.    Radiology Report: US guidance was used to examine the joint, ensure adequate needle placement and to decrease the risk of joint aggravation. The intracondylar notch, retropatellar fat pad, patella tendon, patella and tibia were all visualized. Pre and post injection US still images were obtained and placed in the record. Image were obtained with a 6AP[employer] ultrasound transducer (model [id number]).    Procedure Note: After steriley prepping the RIGHT knee, it was injected with a 2mL of Euflexxa and the medication was observed going into the intra-articular space via US guidance.    The patient tolerated the procedure without difficulty.    MISSY Anaya

## 2025-07-14 ENCOUNTER — OFFICE VISIT (OUTPATIENT)
Dept: ORTHOPEDIC SURGERY | Age: 80
End: 2025-07-14
Payer: MEDICARE

## 2025-07-14 DIAGNOSIS — M17.11 PRIMARY OSTEOARTHRITIS OF RIGHT KNEE: Primary | ICD-10-CM

## 2025-07-14 PROCEDURE — 20611 DRAIN/INJ JOINT/BURSA W/US: CPT | Performed by: PHYSICIAN ASSISTANT

## 2025-07-14 NOTE — PROGRESS NOTES
Name: Angle Morgan  YOB: 1945  Gender: female  MRN: [id number]     CC: RIGHT Knee Osteoarthritis      PROCEDURE: #3 of 3 Hyaluronic Injection    The patient's name and date of birth were confirmed prior to the injection.  The injection site was also confirmed with the patient prior to the procedure. After discussion of risks and benefits including but not limited to pain, infection, skin discoloration, and injury to blood vessels or nerves the patient verbally consented to proceed with injection of the RIGHT.  The patient is to restrict their activity for 48 hours.    Radiology Report: US guidance was used to examine the joint, ensure adequate needle placement and to decrease the risk of joint aggravation. The intracondylar notch, retropatellar fat pad, patella tendon, patella and tibia were all visualized. Pre and post injection US still images were obtained and placed in the record. Image were obtained with a [employer] ultrasound transducer (model [id number]).    Procedure Note: After steriley prepping the RIGHT knee, it was injected with a 2mL of Euflexxa and the medication was observed going into the intra-articular space via US guidance.    The patient tolerated the procedure without difficulty.    MISSY Anaya

## (undated) DEVICE — DISPOSABLE TOURNIQUET CUFF SINGLE BLADDER, DUAL PORT AND QUICK CONNECT CONNECTOR: Brand: COLOR CUFF

## (undated) DEVICE — SOL IRR SOD CL 0.9% 3000ML --

## (undated) DEVICE — BANDAGE,GAUZE,CONFORMING,3"X75",STRL,LF: Brand: MEDLINE

## (undated) DEVICE — BUTTON SWITCH PENCIL BLADE ELECTRODE, HOLSTER: Brand: EDGE

## (undated) DEVICE — PREP SKN CHLRAPRP APL 26ML STR --

## (undated) DEVICE — ABSORBENT, WATERPROOF, BACTERIA PROOF FILM DRESSING: Brand: OPSITE POST OP 20X10CM CTN 20

## (undated) DEVICE — SYSTEM SKIN CLSR 22CM DERMBND PRINEO

## (undated) DEVICE — BIT DRL SCR PERIPH 2.7MM DISP --

## (undated) DEVICE — SURGICAL PROCEDURE PACK BASIC ST FRANCIS

## (undated) DEVICE — SUTURE MCRYL SZ 4-0 L27IN ABSRB UD L19MM PS-2 1/2 CIR PRIM Y426H

## (undated) DEVICE — REM POLYHESIVE ADULT PATIENT RETURN ELECTRODE: Brand: VALLEYLAB

## (undated) DEVICE — SUTURE VICRYL SZ 2-0 L36IN ABSRB UD L36MM CT-1 1/2 CIR J945H

## (undated) DEVICE — SKIN MARKER,REGULAR TIP WITH RULER AND LABELS: Brand: DEVON

## (undated) DEVICE — SUTURE VCRL SZ 3-0 L18IN ABSRB UD PS-2 L19MM 1/2 CIR J497G

## (undated) DEVICE — BIPOLAR SEALER 23-112-1 AQM 6.0: Brand: AQUAMANTYS ®

## (undated) DEVICE — AMD ANTIMICROBIAL SUPER SPONGES,MEDIUM: Brand: KERLIX

## (undated) DEVICE — ACDF DR VANPELT & LUCAS: Brand: MEDLINE INDUSTRIES, INC.

## (undated) DEVICE — CORD,CAUTERY,BIPOLAR,STERILE: Brand: MEDLINE

## (undated) DEVICE — PADDING CAST W4INXL4YD ST COT COHESIVE HND TEARABLE SPEC

## (undated) DEVICE — Device

## (undated) DEVICE — INTENDED FOR TISSUE SEPARATION, AND OTHER PROCEDURES THAT REQUIRE A SHARP SURGICAL BLADE TO PUNCTURE OR CUT.: Brand: BARD-PARKER SAFETY BLADES SIZE 15, STERILE

## (undated) DEVICE — 3M™ COBAN™ STERILE SELF-ADHERENT WRAP, 1584S, 4 IN X 5 YD (10 CM X 4,5 M), 18 ROLLS/CASE: Brand: 3M™ COBAN™

## (undated) DEVICE — 3M™ STERI-DRAPE™ INSTRUMENT POUCH 1018: Brand: STERI-DRAPE™

## (undated) DEVICE — INTENDED FOR TISSUE SEPARATION, AND OTHER PROCEDURES THAT REQUIRE A SHARP SURGICAL BLADE TO PUNCTURE OR CUT.: Brand: BARD-PARKER ® STAINLESS STEEL BLADES

## (undated) DEVICE — GARMENT,MEDLINE,DVT,INT,CALF,MED, GEN2: Brand: MEDLINE

## (undated) DEVICE — TAP: Brand: XIA 3 SYSTEM - SERRATO

## (undated) DEVICE — SOLUTION IRRIG 1000ML 09% SOD CHL USP PIC PLAS CONTAINER

## (undated) DEVICE — SOLUTION IV 1000ML 0.9% SOD CHL

## (undated) DEVICE — 3M™ IOBAN™ 2 ANTIMICROBIAL INCISE DRAPE 6650EZ: Brand: IOBAN™ 2

## (undated) DEVICE — DRAPE SHT 3 QTR PROXIMA 53X77 --

## (undated) DEVICE — TRAY PROC 13FR L10CM RED SIGNAL DIAPH FLX CATH SYR ACT DRN

## (undated) DEVICE — DRAPE,SHOULDER,BEACH CHAIR,STERILE: Brand: MEDLINE

## (undated) DEVICE — DRAPE MICSCP W51XL150IN FOR LEICA M680 WILD OHS

## (undated) DEVICE — AGENT HEMOSTATIC SURGIFLOW MATRIX KIT W/THROMBIN

## (undated) DEVICE — UNIVERSAL DRAPES: Brand: MEDLINE INDUSTRIES, INC.

## (undated) DEVICE — T4 HOOD

## (undated) DEVICE — AGENT HEMSTAT 8ML FLX TIP MTRX + DISP SURGIFLO

## (undated) DEVICE — SUTURE VCRL SZ 0 L27IN ABSRB UD L36MM CP-1 1/2 CIR REV CUT J267H

## (undated) DEVICE — SUTURE VCRL SZ 1 L27IN ABSRB UD L36MM CP-1 1/2 CIR REV CUT J268H

## (undated) DEVICE — C-ARM: Brand: UNBRANDED

## (undated) DEVICE — DRAPE XR C ARM 41X74IN LF --

## (undated) DEVICE — STAPLER SKN FIX HD COUNT STRL -- SUB STAPLER35WA  6/CA $58.16

## (undated) DEVICE — SUTURE PERMAHAND SZ 2-0 L30IN NONABSORBABLE BLK SILK W/O A305H

## (undated) DEVICE — CAP PROTCT PIN BALL 0.045IN --

## (undated) DEVICE — SUTURE COAT VCRL SZ 4-0 L18IN ABSRB UD L19MM PS-2 1/2 CIR J496G

## (undated) DEVICE — TRAY PREP DRY W/ PREM GLV 2 APPL 6 SPNG 2 UNDPD 1 OVERWRAP

## (undated) DEVICE — 4.0MM PRECISION ROUND

## (undated) DEVICE — NEEDLE SUT 1/2 CIR TAPR TIP MAYO NONSTERILE SZ 5

## (undated) DEVICE — HEX-LOCKING BLADE ELECTRODE: Brand: EDGE

## (undated) DEVICE — RESTRAINT POS UNIV HD NK ALIGN DISP FOR SHLDR PROC

## (undated) DEVICE — GEL MEDC ULTRASOUND 5L -- REPLACED BY 326862

## (undated) DEVICE — STERILE POLYISOPRENE POWDER-FREE SURGICAL GLOVES: Brand: PROTEXIS

## (undated) DEVICE — POSTERIOR LAMI VANPLT-LUCAS: Brand: MEDLINE INDUSTRIES, INC.

## (undated) DEVICE — KIT CHAIR TRIMANO FOAM W/ SUPP ARM DRP ERGONOMICALLY DESIGNED

## (undated) DEVICE — PAD,ABDOMINAL,5"X9",ST,LF,25/BX: Brand: MEDLINE INDUSTRIES, INC.

## (undated) DEVICE — SUT FIBRWIR 2 38IN BLU --

## (undated) DEVICE — SUTURE MONOCRYL + ABSORBABLE MONOFILAMENT 3-0 PS1 27 IN UD SXMP1B104

## (undated) DEVICE — PREP CHLORAPREP 10.5 ML ORG --

## (undated) DEVICE — INTENDED FOR TISSUE SEPARATION, AND OTHER PROCEDURES THAT REQUIRE A SHARP SURGICAL BLADE TO PUNCTURE OR CUT.: Brand: BARD-PARKER SAFETY BLADES SIZE 10, STERILE

## (undated) DEVICE — CAP PROTCT GRN REFIL FOR 0.054-0.062IN WIRE W SER PIN BALL

## (undated) DEVICE — IMPLANTABLE DEVICE
Type: IMPLANTABLE DEVICE | Site: SHOULDER | Status: NON-FUNCTIONAL
Removed: 2022-02-14

## (undated) DEVICE — ABSORBENT, WATERPROOF, BACTERIA PROOF FILM DRESSING: Brand: OPSITE POST OP 9.5X8.5CM CTN 20

## (undated) DEVICE — BAND RUB 1/8X2.5IN STRL --

## (undated) DEVICE — FAN SPRAY KIT: Brand: PULSAVAC®

## (undated) DEVICE — SUTURE VICRYL SZ 1 L27IN ABSRB UD L36MM CP-1 1/2 CIR REV CUT J268H

## (undated) DEVICE — NEEDLE SPNL 22GA TRNSLUC YEL WIND HUB ANES FIT STYL DISP

## (undated) DEVICE — SUT ETHLN 5-0 18IN P3 BLK --

## (undated) DEVICE — SYRINGE, LUER LOCK, 60ML: Brand: MEDLINE

## (undated) DEVICE — SPONGE: SPECIALTY PEANUT XR 100/CS: Brand: MEDICAL ACTION INDUSTRIES

## (undated) DEVICE — SUTURE VCRL + 3-0 L27IN ABSRB UD PS-2 L19MM 3/8 CIR PRIM VCP427H

## (undated) DEVICE — TRAY,URINE METER,100% SILICONE,16FR10ML: Brand: MEDLINE

## (undated) DEVICE — SUTURE VCRL SZ 2-0 L27IN ABSRB UD L36MM CP-1 1/2 CIR REV J266H

## (undated) DEVICE — 2108 SERIES SAGITTAL BLADE, GROUND (18.5 X 1.32 X 86.0MM)

## (undated) DEVICE — 3000CC GUARDIAN II: Brand: GUARDIAN

## (undated) DEVICE — CARDINAL HEALTH FLEXIBLE LIGHT HANDLE COVER: Brand: CARDINAL HEALTH

## (undated) DEVICE — FORCEPS BPLR L210MM TIP L1.5 WRK L105MM STR BAYNT INSUL DISP

## (undated) DEVICE — STERILE PACKAGE WITH CANNULA: Brand: LITE BIO DELIVERY SYSTEM

## (undated) DEVICE — STOCKINETTE TUBE 9X48 -- MEDICHOICE

## (undated) DEVICE — ADHESIVE SKIN CLOSURE WND 8.661X1.5 IN 22 CM LIQUIBAND SECUR

## (undated) DEVICE — 3.2MM X 18.3MM METAL CUTTING HELIOCOIDAL RASP

## (undated) DEVICE — SOLUTION IRRIG 1000ML 0.9% SOD CHL USP POUR PLAS BTL

## (undated) DEVICE — BONE WAX WHITE: Brand: BONE WAX WHITE